# Patient Record
Sex: FEMALE | Race: WHITE | NOT HISPANIC OR LATINO | Employment: PART TIME | URBAN - METROPOLITAN AREA
[De-identification: names, ages, dates, MRNs, and addresses within clinical notes are randomized per-mention and may not be internally consistent; named-entity substitution may affect disease eponyms.]

---

## 2017-06-06 ENCOUNTER — OFFICE VISIT - HEALTHEAST (OUTPATIENT)
Dept: INTERNAL MEDICINE | Facility: CLINIC | Age: 62
End: 2017-06-06

## 2017-06-06 ENCOUNTER — COMMUNICATION - HEALTHEAST (OUTPATIENT)
Dept: TELEHEALTH | Facility: CLINIC | Age: 62
End: 2017-06-06

## 2017-06-06 DIAGNOSIS — Z12.31 OTHER SCREENING MAMMOGRAM: ICD-10-CM

## 2017-06-06 DIAGNOSIS — Z12.4 SCREENING FOR CERVICAL CANCER: ICD-10-CM

## 2017-06-06 DIAGNOSIS — Z00.00 ROUTINE GENERAL MEDICAL EXAMINATION AT A HEALTH CARE FACILITY: ICD-10-CM

## 2017-06-06 DIAGNOSIS — G89.29 CHRONIC BILATERAL LOW BACK PAIN WITH SCIATICA, SCIATICA LATERALITY UNSPECIFIED: ICD-10-CM

## 2017-06-06 DIAGNOSIS — Z13.220 SCREENING FOR HYPERLIPIDEMIA: ICD-10-CM

## 2017-06-06 DIAGNOSIS — Z12.11 SCREENING FOR COLON CANCER: ICD-10-CM

## 2017-06-06 DIAGNOSIS — Z13.1 SCREENING FOR DIABETES MELLITUS: ICD-10-CM

## 2017-06-06 DIAGNOSIS — M54.40 CHRONIC BILATERAL LOW BACK PAIN WITH SCIATICA, SCIATICA LATERALITY UNSPECIFIED: ICD-10-CM

## 2017-06-06 LAB
CHOLEST SERPL-MCNC: 231 MG/DL
FASTING STATUS PATIENT QL REPORTED: YES
HBA1C MFR BLD: 5.8 % (ref 3.5–6)
HDLC SERPL-MCNC: 63 MG/DL
LDLC SERPL CALC-MCNC: 146 MG/DL
TRIGL SERPL-MCNC: 110 MG/DL

## 2017-06-06 ASSESSMENT — MIFFLIN-ST. JEOR: SCORE: 1403.18

## 2017-06-13 ENCOUNTER — RECORDS - HEALTHEAST (OUTPATIENT)
Dept: ADMINISTRATIVE | Facility: OTHER | Age: 62
End: 2017-06-13

## 2017-06-13 ENCOUNTER — HOSPITAL ENCOUNTER (OUTPATIENT)
Dept: MAMMOGRAPHY | Facility: CLINIC | Age: 62
Discharge: HOME | End: 2017-06-13
Attending: INTERNAL MEDICINE

## 2017-06-13 ENCOUNTER — HOSPITAL ENCOUNTER (OUTPATIENT)
Dept: MRI IMAGING | Facility: CLINIC | Age: 62
Discharge: HOME | End: 2017-06-13
Attending: INTERNAL MEDICINE

## 2017-06-13 DIAGNOSIS — Z12.31 OTHER SCREENING MAMMOGRAM: ICD-10-CM

## 2017-06-13 DIAGNOSIS — M54.40 CHRONIC BILATERAL LOW BACK PAIN WITH SCIATICA, SCIATICA LATERALITY UNSPECIFIED: ICD-10-CM

## 2017-06-13 DIAGNOSIS — G89.29 CHRONIC BILATERAL LOW BACK PAIN WITH SCIATICA, SCIATICA LATERALITY UNSPECIFIED: ICD-10-CM

## 2017-06-16 ENCOUNTER — COMMUNICATION - HEALTHEAST (OUTPATIENT)
Dept: INTERNAL MEDICINE | Facility: CLINIC | Age: 62
End: 2017-06-16

## 2017-06-16 ENCOUNTER — HOSPITAL ENCOUNTER (OUTPATIENT)
Dept: PHYSICAL MEDICINE AND REHAB | Facility: CLINIC | Age: 62
Discharge: HOME | End: 2017-06-16
Attending: INTERNAL MEDICINE

## 2017-06-16 DIAGNOSIS — M47.816 LUMBAR FACET ARTHROPATHY: ICD-10-CM

## 2017-06-16 DIAGNOSIS — M43.16 SPONDYLOLISTHESIS, LUMBAR REGION: ICD-10-CM

## 2017-06-16 DIAGNOSIS — M54.50 LUMBAGO: ICD-10-CM

## 2017-06-16 ASSESSMENT — MIFFLIN-ST. JEOR: SCORE: 1385.03

## 2017-07-03 ENCOUNTER — OFFICE VISIT - HEALTHEAST (OUTPATIENT)
Dept: PHYSICAL THERAPY | Facility: CLINIC | Age: 62
End: 2017-07-03

## 2017-07-03 DIAGNOSIS — M54.50 CHRONIC BILATERAL LOW BACK PAIN WITHOUT SCIATICA: ICD-10-CM

## 2017-07-03 DIAGNOSIS — M47.816 FACET ARTHROPATHY, LUMBAR: ICD-10-CM

## 2017-07-03 DIAGNOSIS — G89.29 CHRONIC BILATERAL LOW BACK PAIN WITHOUT SCIATICA: ICD-10-CM

## 2017-07-03 DIAGNOSIS — R29.898 WEAKNESS OF BACK: ICD-10-CM

## 2017-07-07 ENCOUNTER — OFFICE VISIT - HEALTHEAST (OUTPATIENT)
Dept: PHYSICAL THERAPY | Facility: CLINIC | Age: 62
End: 2017-07-07

## 2017-07-07 DIAGNOSIS — M47.816 FACET ARTHROPATHY, LUMBAR: ICD-10-CM

## 2017-07-07 DIAGNOSIS — G89.29 CHRONIC BILATERAL LOW BACK PAIN WITHOUT SCIATICA: ICD-10-CM

## 2017-07-07 DIAGNOSIS — R29.898 WEAKNESS OF BACK: ICD-10-CM

## 2017-07-07 DIAGNOSIS — M54.50 CHRONIC BILATERAL LOW BACK PAIN WITHOUT SCIATICA: ICD-10-CM

## 2017-07-10 ENCOUNTER — OFFICE VISIT - HEALTHEAST (OUTPATIENT)
Dept: PHYSICAL THERAPY | Facility: CLINIC | Age: 62
End: 2017-07-10

## 2017-07-10 DIAGNOSIS — G89.29 CHRONIC BILATERAL LOW BACK PAIN WITHOUT SCIATICA: ICD-10-CM

## 2017-07-10 DIAGNOSIS — R29.898 WEAKNESS OF BACK: ICD-10-CM

## 2017-07-10 DIAGNOSIS — M54.50 CHRONIC BILATERAL LOW BACK PAIN WITHOUT SCIATICA: ICD-10-CM

## 2017-07-10 DIAGNOSIS — M47.816 FACET ARTHROPATHY, LUMBAR: ICD-10-CM

## 2017-07-14 ENCOUNTER — OFFICE VISIT - HEALTHEAST (OUTPATIENT)
Dept: PHYSICAL THERAPY | Facility: CLINIC | Age: 62
End: 2017-07-14

## 2017-07-14 DIAGNOSIS — M47.816 FACET ARTHROPATHY, LUMBAR: ICD-10-CM

## 2017-07-14 DIAGNOSIS — M54.50 CHRONIC BILATERAL LOW BACK PAIN WITHOUT SCIATICA: ICD-10-CM

## 2017-07-14 DIAGNOSIS — G89.29 CHRONIC BILATERAL LOW BACK PAIN WITHOUT SCIATICA: ICD-10-CM

## 2017-07-14 DIAGNOSIS — R29.898 WEAKNESS OF BACK: ICD-10-CM

## 2017-07-17 ENCOUNTER — OFFICE VISIT - HEALTHEAST (OUTPATIENT)
Dept: PHYSICAL THERAPY | Facility: CLINIC | Age: 62
End: 2017-07-17

## 2017-07-17 DIAGNOSIS — M54.50 CHRONIC BILATERAL LOW BACK PAIN WITHOUT SCIATICA: ICD-10-CM

## 2017-07-17 DIAGNOSIS — M47.816 FACET ARTHROPATHY, LUMBAR: ICD-10-CM

## 2017-07-17 DIAGNOSIS — G89.29 CHRONIC BILATERAL LOW BACK PAIN WITHOUT SCIATICA: ICD-10-CM

## 2017-07-17 DIAGNOSIS — R29.898 WEAKNESS OF BACK: ICD-10-CM

## 2017-07-19 ENCOUNTER — OFFICE VISIT - HEALTHEAST (OUTPATIENT)
Dept: PHYSICAL THERAPY | Facility: CLINIC | Age: 62
End: 2017-07-19

## 2017-07-19 DIAGNOSIS — M47.816 FACET ARTHROPATHY, LUMBAR: ICD-10-CM

## 2017-07-19 DIAGNOSIS — G89.29 CHRONIC BILATERAL LOW BACK PAIN WITHOUT SCIATICA: ICD-10-CM

## 2017-07-19 DIAGNOSIS — M54.50 CHRONIC BILATERAL LOW BACK PAIN WITHOUT SCIATICA: ICD-10-CM

## 2017-07-19 DIAGNOSIS — R29.898 WEAKNESS OF BACK: ICD-10-CM

## 2017-07-24 ENCOUNTER — OFFICE VISIT - HEALTHEAST (OUTPATIENT)
Dept: PHYSICAL THERAPY | Facility: CLINIC | Age: 62
End: 2017-07-24

## 2017-07-24 DIAGNOSIS — G89.29 CHRONIC BILATERAL LOW BACK PAIN WITHOUT SCIATICA: ICD-10-CM

## 2017-07-24 DIAGNOSIS — R29.898 WEAKNESS OF BACK: ICD-10-CM

## 2017-07-24 DIAGNOSIS — M54.50 CHRONIC BILATERAL LOW BACK PAIN WITHOUT SCIATICA: ICD-10-CM

## 2017-07-24 DIAGNOSIS — M47.816 FACET ARTHROPATHY, LUMBAR: ICD-10-CM

## 2017-07-26 ENCOUNTER — OFFICE VISIT - HEALTHEAST (OUTPATIENT)
Dept: PHYSICAL THERAPY | Facility: CLINIC | Age: 62
End: 2017-07-26

## 2017-07-26 DIAGNOSIS — G89.29 CHRONIC BILATERAL LOW BACK PAIN WITHOUT SCIATICA: ICD-10-CM

## 2017-07-26 DIAGNOSIS — R29.898 WEAKNESS OF BACK: ICD-10-CM

## 2017-07-26 DIAGNOSIS — M47.816 FACET ARTHROPATHY, LUMBAR: ICD-10-CM

## 2017-07-26 DIAGNOSIS — M54.50 CHRONIC BILATERAL LOW BACK PAIN WITHOUT SCIATICA: ICD-10-CM

## 2017-07-28 ENCOUNTER — HOSPITAL ENCOUNTER (OUTPATIENT)
Dept: PHYSICAL MEDICINE AND REHAB | Facility: CLINIC | Age: 62
Discharge: HOME | End: 2017-07-28
Attending: PHYSICIAN ASSISTANT

## 2017-07-28 DIAGNOSIS — M47.816 LUMBAR FACET ARTHROPATHY: ICD-10-CM

## 2017-07-28 DIAGNOSIS — M54.50 LUMBAGO: ICD-10-CM

## 2017-08-11 ENCOUNTER — RECORDS - HEALTHEAST (OUTPATIENT)
Dept: ADMINISTRATIVE | Facility: OTHER | Age: 62
End: 2017-08-11

## 2017-12-29 ENCOUNTER — OFFICE VISIT - HEALTHEAST (OUTPATIENT)
Dept: INTERNAL MEDICINE | Facility: CLINIC | Age: 62
End: 2017-12-29

## 2017-12-29 DIAGNOSIS — J06.9 URI (UPPER RESPIRATORY INFECTION): ICD-10-CM

## 2017-12-29 DIAGNOSIS — J32.9 SINUSITIS: ICD-10-CM

## 2018-06-07 ENCOUNTER — OFFICE VISIT - HEALTHEAST (OUTPATIENT)
Dept: INTERNAL MEDICINE | Facility: CLINIC | Age: 63
End: 2018-06-07

## 2018-06-07 ENCOUNTER — COMMUNICATION - HEALTHEAST (OUTPATIENT)
Dept: INTERNAL MEDICINE | Facility: CLINIC | Age: 63
End: 2018-06-07

## 2018-06-07 DIAGNOSIS — N28.1 RENAL CYST: ICD-10-CM

## 2018-06-07 DIAGNOSIS — Z00.00 ANNUAL PHYSICAL EXAM: ICD-10-CM

## 2018-06-07 DIAGNOSIS — E55.9 VITAMIN D DEFICIENCY: ICD-10-CM

## 2018-06-07 DIAGNOSIS — N89.8 VAGINAL DISCHARGE: ICD-10-CM

## 2018-06-07 DIAGNOSIS — E78.5 HYPERLIPIDEMIA: ICD-10-CM

## 2018-06-07 DIAGNOSIS — D69.1 THROMBOCYTOPATHIA (H): ICD-10-CM

## 2018-06-07 DIAGNOSIS — Z12.4 ENCOUNTER FOR SCREENING FOR CERVICAL CANCER: ICD-10-CM

## 2018-06-07 DIAGNOSIS — Z12.31 ENCOUNTER FOR SCREENING MAMMOGRAM FOR BREAST CANCER: ICD-10-CM

## 2018-06-07 LAB
ALBUMIN SERPL-MCNC: 4.3 G/DL (ref 3.5–5)
ALP SERPL-CCNC: 54 U/L (ref 45–120)
ALT SERPL W P-5'-P-CCNC: 15 U/L (ref 0–45)
ANION GAP SERPL CALCULATED.3IONS-SCNC: 10 MMOL/L (ref 5–18)
AST SERPL W P-5'-P-CCNC: 14 U/L (ref 0–40)
BASOPHILS # BLD AUTO: 0 THOU/UL (ref 0–0.2)
BASOPHILS NFR BLD AUTO: 0 % (ref 0–2)
BILIRUB SERPL-MCNC: 0.7 MG/DL (ref 0–1)
BUN SERPL-MCNC: 11 MG/DL (ref 8–22)
CALCIUM SERPL-MCNC: 10 MG/DL (ref 8.5–10.5)
CHLORIDE BLD-SCNC: 105 MMOL/L (ref 98–107)
CHOLEST SERPL-MCNC: 242 MG/DL
CLUE CELLS: NORMAL
CO2 SERPL-SCNC: 26 MMOL/L (ref 22–31)
CREAT SERPL-MCNC: 0.85 MG/DL (ref 0.6–1.1)
EOSINOPHIL # BLD AUTO: 0.1 THOU/UL (ref 0–0.4)
EOSINOPHIL NFR BLD AUTO: 3 % (ref 0–6)
ERYTHROCYTE [DISTWIDTH] IN BLOOD BY AUTOMATED COUNT: 11.9 % (ref 11–14.5)
FASTING STATUS PATIENT QL REPORTED: YES
FOLATE SERPL-MCNC: 15.4 NG/ML
GFR SERPL CREATININE-BSD FRML MDRD: >60 ML/MIN/1.73M2
GLUCOSE BLD-MCNC: 103 MG/DL (ref 70–125)
HCT VFR BLD AUTO: 41.9 % (ref 35–47)
HDLC SERPL-MCNC: 66 MG/DL
HGB BLD-MCNC: 14.3 G/DL (ref 12–16)
LDLC SERPL CALC-MCNC: 156 MG/DL
LYMPHOCYTES # BLD AUTO: 1.6 THOU/UL (ref 0.8–4.4)
LYMPHOCYTES NFR BLD AUTO: 38 % (ref 20–40)
MCH RBC QN AUTO: 30.7 PG (ref 27–34)
MCHC RBC AUTO-ENTMCNC: 34.1 G/DL (ref 32–36)
MCV RBC AUTO: 90 FL (ref 80–100)
MONOCYTES # BLD AUTO: 0.2 THOU/UL (ref 0–0.9)
MONOCYTES NFR BLD AUTO: 5 % (ref 2–10)
NEUTROPHILS # BLD AUTO: 2.3 THOU/UL (ref 2–7.7)
NEUTROPHILS NFR BLD AUTO: 54 % (ref 50–70)
PLATELET # BLD AUTO: 88 THOU/UL (ref 140–440)
PMV BLD AUTO: 10.8 FL (ref 7–10)
POTASSIUM BLD-SCNC: 4.1 MMOL/L (ref 3.5–5)
PROT SERPL-MCNC: 7 G/DL (ref 6–8)
RBC # BLD AUTO: 4.65 MILL/UL (ref 3.8–5.4)
SODIUM SERPL-SCNC: 141 MMOL/L (ref 136–145)
TRICHOMONAS, WET PREP: NORMAL
TRIGL SERPL-MCNC: 102 MG/DL
TSH SERPL DL<=0.005 MIU/L-ACNC: 0.95 UIU/ML (ref 0.3–5)
VIT B12 SERPL-MCNC: 568 PG/ML (ref 213–816)
WBC: 4.3 THOU/UL (ref 4–11)
YEAST, WET PREP: NORMAL

## 2018-06-07 ASSESSMENT — MIFFLIN-ST. JEOR: SCORE: 1356.46

## 2018-06-08 LAB
25(OH)D3 SERPL-MCNC: 36.4 NG/ML (ref 30–80)
25(OH)D3 SERPL-MCNC: 36.4 NG/ML (ref 30–80)
HPV SOURCE: NORMAL
HUMAN PAPILLOMA VIRUS 16 DNA: NEGATIVE
HUMAN PAPILLOMA VIRUS 18 DNA: NEGATIVE
HUMAN PAPILLOMA VIRUS FINAL DIAGNOSIS: NORMAL
HUMAN PAPILLOMA VIRUS OTHER HR: NEGATIVE
SPECIMEN DESCRIPTION: NORMAL

## 2018-06-10 ENCOUNTER — COMMUNICATION - HEALTHEAST (OUTPATIENT)
Dept: INTERNAL MEDICINE | Facility: CLINIC | Age: 63
End: 2018-06-10

## 2018-06-10 DIAGNOSIS — D69.6 THROMBOCYTOPENIA (H): ICD-10-CM

## 2018-06-13 ENCOUNTER — HOSPITAL ENCOUNTER (OUTPATIENT)
Dept: ULTRASOUND IMAGING | Facility: HOSPITAL | Age: 63
Discharge: HOME | End: 2018-06-13
Attending: INTERNAL MEDICINE

## 2018-06-13 DIAGNOSIS — N28.1 RENAL CYST: ICD-10-CM

## 2018-06-18 ENCOUNTER — RECORDS - HEALTHEAST (OUTPATIENT)
Dept: ADMINISTRATIVE | Facility: OTHER | Age: 63
End: 2018-06-18

## 2018-06-19 LAB
BKR LAB AP ABNORMAL BLEEDING: NO
BKR LAB AP BIRTH CONTROL/HORMONES: NORMAL
BKR LAB AP CERVICAL APPEARANCE: NORMAL
BKR LAB AP GYN ADEQUACY: NORMAL
BKR LAB AP GYN INTERPRETATION: NORMAL
BKR LAB AP HPV REFLEX: NORMAL
BKR LAB AP LMP: NORMAL
BKR LAB AP PATIENT STATUS: NORMAL
BKR LAB AP PREVIOUS ABNORMAL: NORMAL
BKR LAB AP PREVIOUS NORMAL: 2015
HIGH RISK?: NO
PATH REPORT.COMMENTS IMP SPEC: NORMAL
RESULT FLAG (HE HISTORICAL CONVERSION): NORMAL

## 2018-06-20 ENCOUNTER — HOSPITAL ENCOUNTER (OUTPATIENT)
Dept: MAMMOGRAPHY | Facility: CLINIC | Age: 63
Discharge: HOME | End: 2018-06-20
Attending: INTERNAL MEDICINE

## 2018-06-20 DIAGNOSIS — Z12.31 ENCOUNTER FOR SCREENING MAMMOGRAM FOR BREAST CANCER: ICD-10-CM

## 2018-08-08 ENCOUNTER — AMBULATORY - HEALTHEAST (OUTPATIENT)
Dept: NURSING | Facility: CLINIC | Age: 63
End: 2018-08-08

## 2018-08-08 ENCOUNTER — COMMUNICATION - HEALTHEAST (OUTPATIENT)
Dept: INTERNAL MEDICINE | Facility: CLINIC | Age: 63
End: 2018-08-08

## 2018-08-08 DIAGNOSIS — Z00.00 ROUTINE HEALTH MAINTENANCE: ICD-10-CM

## 2018-08-27 ENCOUNTER — COMMUNICATION - HEALTHEAST (OUTPATIENT)
Dept: INTERNAL MEDICINE | Facility: CLINIC | Age: 63
End: 2018-08-27

## 2018-08-27 DIAGNOSIS — N95.2 ATROPHIC VAGINITIS: ICD-10-CM

## 2018-09-24 ENCOUNTER — AMBULATORY - HEALTHEAST (OUTPATIENT)
Dept: LAB | Facility: CLINIC | Age: 63
End: 2018-09-24

## 2018-09-24 DIAGNOSIS — D69.6 THROMBOCYTOPENIA (H): ICD-10-CM

## 2018-09-24 LAB
BASOPHILS # BLD AUTO: 0 THOU/UL (ref 0–0.2)
BASOPHILS NFR BLD AUTO: 1 % (ref 0–2)
EOSINOPHIL # BLD AUTO: 0.1 THOU/UL (ref 0–0.4)
EOSINOPHIL NFR BLD AUTO: 2 % (ref 0–6)
ERYTHROCYTE [DISTWIDTH] IN BLOOD BY AUTOMATED COUNT: 12.3 % (ref 11–14.5)
HCT VFR BLD AUTO: 38 % (ref 35–47)
HGB BLD-MCNC: 13.2 G/DL (ref 12–16)
LYMPHOCYTES # BLD AUTO: 1.5 THOU/UL (ref 0.8–4.4)
LYMPHOCYTES NFR BLD AUTO: 32 % (ref 20–40)
MCH RBC QN AUTO: 30.8 PG (ref 27–34)
MCHC RBC AUTO-ENTMCNC: 34.7 G/DL (ref 32–36)
MCV RBC AUTO: 89 FL (ref 80–100)
MONOCYTES # BLD AUTO: 0.5 THOU/UL (ref 0–0.9)
MONOCYTES NFR BLD AUTO: 11 % (ref 2–10)
NEUTROPHILS # BLD AUTO: 2.5 THOU/UL (ref 2–7.7)
NEUTROPHILS NFR BLD AUTO: 54 % (ref 50–70)
PLATELET # BLD AUTO: 136 THOU/UL (ref 140–440)
PMV BLD AUTO: 12.5 FL (ref 8.5–12.5)
RBC # BLD AUTO: 4.29 MILL/UL (ref 3.8–5.4)
WBC: 4.6 THOU/UL (ref 4–11)

## 2018-09-25 LAB
BASOPHILS # BLD AUTO: 0 THOU/UL (ref 0–0.2)
BASOPHILS NFR BLD AUTO: 1 % (ref 0–2)
EOSINOPHIL COUNT (ABSOLUTE): 0.1 THOU/UL (ref 0–0.4)
EOSINOPHIL NFR BLD AUTO: 2 % (ref 0–6)
ERYTHROCYTE [DISTWIDTH] IN BLOOD BY AUTOMATED COUNT: 12.3 % (ref 11–14.5)
HCT VFR BLD AUTO: 38 % (ref 35–47)
HGB BLD-MCNC: 13.2 G/DL (ref 12–16)
LAB AP CHARGES (HE HISTORICAL CONVERSION): NORMAL
LYMPHOCYTES # BLD AUTO: 1.2 THOU/UL (ref 0.8–4.4)
LYMPHOCYTES NFR BLD AUTO: 26 % (ref 20–40)
MCH RBC QN AUTO: 30.8 PG (ref 27–34)
MCHC RBC AUTO-ENTMCNC: 34.7 G/DL (ref 32–36)
MCV RBC AUTO: 89 FL (ref 80–100)
MONOCYTES # BLD AUTO: 0.3 THOU/UL (ref 0–0.9)
MONOCYTES NFR BLD AUTO: 7 % (ref 2–10)
PATH REPORT.COMMENTS IMP SPEC: NORMAL
PATH REPORT.COMMENTS IMP SPEC: NORMAL
PATH REPORT.FINAL DX SPEC: NORMAL
PATH REPORT.MICROSCOPIC SPEC OTHER STN: ABNORMAL
PATH REPORT.MICROSCOPIC SPEC OTHER STN: NORMAL
PATH REPORT.RELEVANT HX SPEC: NORMAL
PLAT MORPH BLD: ABNORMAL
PLATELET # BLD AUTO: 136 THOU/UL (ref 140–440)
PMV BLD AUTO: 12.5 FL (ref 8.5–12.5)
RBC # BLD AUTO: 4.29 MILL/UL (ref 3.8–5.4)
TOTAL NEUTROPHILS-ABS(DIFF): 3 THOU/UL (ref 2–7.7)
TOTAL NEUTROPHILS-REL(DIFF): 65 % (ref 50–70)
WBC: 4.6 THOU/UL (ref 4–11)

## 2018-11-16 ENCOUNTER — AMBULATORY - HEALTHEAST (OUTPATIENT)
Dept: NURSING | Facility: CLINIC | Age: 63
End: 2018-11-16

## 2018-11-16 DIAGNOSIS — Z00.00 ROUTINE HEALTH MAINTENANCE: ICD-10-CM

## 2018-11-26 ENCOUNTER — COMMUNICATION - HEALTHEAST (OUTPATIENT)
Dept: INTERNAL MEDICINE | Facility: CLINIC | Age: 63
End: 2018-11-26

## 2018-11-26 DIAGNOSIS — N95.2 ATROPHIC VAGINITIS: ICD-10-CM

## 2018-12-03 ENCOUNTER — OFFICE VISIT - HEALTHEAST (OUTPATIENT)
Dept: INTERNAL MEDICINE | Facility: CLINIC | Age: 63
End: 2018-12-03

## 2018-12-03 ENCOUNTER — RECORDS - HEALTHEAST (OUTPATIENT)
Dept: GENERAL RADIOLOGY | Facility: CLINIC | Age: 63
End: 2018-12-03

## 2018-12-03 DIAGNOSIS — M79.644 PAIN OF FINGER OF RIGHT HAND: ICD-10-CM

## 2018-12-03 DIAGNOSIS — M79.644 PAIN IN RIGHT FINGER(S): ICD-10-CM

## 2018-12-03 LAB
BASOPHILS # BLD AUTO: 0.1 THOU/UL (ref 0–0.2)
BASOPHILS NFR BLD AUTO: 1 % (ref 0–2)
EOSINOPHIL # BLD AUTO: 0.1 THOU/UL (ref 0–0.4)
EOSINOPHIL NFR BLD AUTO: 2 % (ref 0–6)
ERYTHROCYTE [DISTWIDTH] IN BLOOD BY AUTOMATED COUNT: 12.1 % (ref 11–14.5)
ERYTHROCYTE [SEDIMENTATION RATE] IN BLOOD BY WESTERGREN METHOD: 10 MM/HR (ref 0–20)
HCT VFR BLD AUTO: 39.1 % (ref 35–47)
HGB BLD-MCNC: 13.3 G/DL (ref 12–16)
LYMPHOCYTES # BLD AUTO: 2.2 THOU/UL (ref 0.8–4.4)
LYMPHOCYTES NFR BLD AUTO: 34 % (ref 20–40)
MCH RBC QN AUTO: 30.9 PG (ref 27–34)
MCHC RBC AUTO-ENTMCNC: 34 G/DL (ref 32–36)
MCV RBC AUTO: 91 FL (ref 80–100)
MONOCYTES # BLD AUTO: 0.6 THOU/UL (ref 0–0.9)
MONOCYTES NFR BLD AUTO: 8 % (ref 2–10)
NEUTROPHILS # BLD AUTO: 3.7 THOU/UL (ref 2–7.7)
NEUTROPHILS NFR BLD AUTO: 55 % (ref 50–70)
PLATELET # BLD AUTO: 140 THOU/UL (ref 140–440)
PMV BLD AUTO: 13.3 FL (ref 8.5–12.5)
RBC # BLD AUTO: 4.31 MILL/UL (ref 3.8–5.4)
WBC: 6.7 THOU/UL (ref 4–11)

## 2018-12-04 LAB
ANION GAP SERPL CALCULATED.3IONS-SCNC: 10 MMOL/L (ref 5–18)
BUN SERPL-MCNC: 16 MG/DL (ref 8–22)
CALCIUM SERPL-MCNC: 10 MG/DL (ref 8.5–10.5)
CCP AB SER IA-ACNC: 2.5 U/ML
CHLORIDE BLD-SCNC: 104 MMOL/L (ref 98–107)
CO2 SERPL-SCNC: 26 MMOL/L (ref 22–31)
CREAT SERPL-MCNC: 0.78 MG/DL (ref 0.6–1.1)
GFR SERPL CREATININE-BSD FRML MDRD: >60 ML/MIN/1.73M2
GLUCOSE BLD-MCNC: 93 MG/DL (ref 70–125)
POTASSIUM BLD-SCNC: 4.2 MMOL/L (ref 3.5–5)
RHEUMATOID FACT SERPL-ACNC: <15 IU/ML (ref 0–30)
SODIUM SERPL-SCNC: 140 MMOL/L (ref 136–145)
URATE SERPL-MCNC: 3.3 MG/DL (ref 2–7.5)

## 2018-12-05 LAB — B BURGDOR IGG+IGM SER QL: 0.17 INDEX VALUE

## 2018-12-06 ENCOUNTER — COMMUNICATION - HEALTHEAST (OUTPATIENT)
Dept: INTERNAL MEDICINE | Facility: CLINIC | Age: 63
End: 2018-12-06

## 2018-12-06 LAB — ANA SER QL: 0.2 U

## 2019-02-14 ENCOUNTER — OFFICE VISIT - HEALTHEAST (OUTPATIENT)
Dept: RHEUMATOLOGY | Facility: CLINIC | Age: 64
End: 2019-02-14

## 2019-02-14 DIAGNOSIS — M19.049 PRIMARY LOCALIZED OSTEOARTHROSIS OF HAND, UNSPECIFIED LATERALITY: ICD-10-CM

## 2019-02-14 DIAGNOSIS — M25.50 POLYARTHRALGIA: ICD-10-CM

## 2019-02-14 ASSESSMENT — MIFFLIN-ST. JEOR: SCORE: 1385.03

## 2019-02-17 ENCOUNTER — COMMUNICATION - HEALTHEAST (OUTPATIENT)
Dept: INTERNAL MEDICINE | Facility: CLINIC | Age: 64
End: 2019-02-17

## 2019-02-17 DIAGNOSIS — N95.2 ATROPHIC VAGINITIS: ICD-10-CM

## 2019-05-28 ENCOUNTER — OFFICE VISIT - HEALTHEAST (OUTPATIENT)
Dept: RHEUMATOLOGY | Facility: CLINIC | Age: 64
End: 2019-05-28

## 2019-05-28 DIAGNOSIS — M19.049 PRIMARY LOCALIZED OSTEOARTHROSIS OF HAND, UNSPECIFIED LATERALITY: ICD-10-CM

## 2019-05-28 DIAGNOSIS — M25.50 POLYARTHRALGIA: ICD-10-CM

## 2019-05-28 ASSESSMENT — MIFFLIN-ST. JEOR: SCORE: 1379.14

## 2019-07-12 ENCOUNTER — RECORDS - HEALTHEAST (OUTPATIENT)
Dept: GENERAL RADIOLOGY | Facility: CLINIC | Age: 64
End: 2019-07-12

## 2019-07-12 ENCOUNTER — OFFICE VISIT - HEALTHEAST (OUTPATIENT)
Dept: INTERNAL MEDICINE | Facility: CLINIC | Age: 64
End: 2019-07-12

## 2019-07-12 DIAGNOSIS — E55.9 VITAMIN D DEFICIENCY: ICD-10-CM

## 2019-07-12 DIAGNOSIS — L71.9 ROSACEA: ICD-10-CM

## 2019-07-12 DIAGNOSIS — E78.49 OTHER HYPERLIPIDEMIA: ICD-10-CM

## 2019-07-12 DIAGNOSIS — Z00.00 ANNUAL PHYSICAL EXAM: ICD-10-CM

## 2019-07-12 DIAGNOSIS — N95.2 ATROPHIC VAGINITIS: ICD-10-CM

## 2019-07-12 DIAGNOSIS — M25.571 PAIN IN JOINT, ANKLE AND FOOT, RIGHT: ICD-10-CM

## 2019-07-12 DIAGNOSIS — M25.571 PAIN IN RIGHT ANKLE AND JOINTS OF RIGHT FOOT: ICD-10-CM

## 2019-07-12 DIAGNOSIS — M79.89 RIGHT LEG SWELLING: ICD-10-CM

## 2019-07-12 LAB
CHOLEST SERPL-MCNC: 251 MG/DL
D DIMER PPP FEU-MCNC: <=0.27 FEU UG/ML
FASTING STATUS PATIENT QL REPORTED: YES
HDLC SERPL-MCNC: 68 MG/DL
LDLC SERPL CALC-MCNC: 158 MG/DL
TRIGL SERPL-MCNC: 123 MG/DL
TSH SERPL DL<=0.005 MIU/L-ACNC: 0.73 UIU/ML (ref 0.3–5)

## 2019-07-12 RX ORDER — AZELAIC ACID 0.15 G/G
GEL TOPICAL
Qty: 30 G | Refills: 3 | Status: SHIPPED | OUTPATIENT
Start: 2019-07-12 | End: 2022-11-16

## 2019-07-12 ASSESSMENT — MIFFLIN-ST. JEOR: SCORE: 1371.71

## 2019-07-15 LAB
25(OH)D3 SERPL-MCNC: 33.7 NG/ML (ref 30–80)
25(OH)D3 SERPL-MCNC: 33.7 NG/ML (ref 30–80)

## 2019-07-24 ENCOUNTER — OFFICE VISIT - HEALTHEAST (OUTPATIENT)
Dept: PHYSICAL THERAPY | Facility: REHABILITATION | Age: 64
End: 2019-07-24

## 2019-07-24 DIAGNOSIS — M25.671 ANKLE STIFFNESS, RIGHT: ICD-10-CM

## 2019-07-24 DIAGNOSIS — M62.81 GENERALIZED MUSCLE WEAKNESS: ICD-10-CM

## 2019-07-24 DIAGNOSIS — M25.672 ANKLE STIFFNESS, LEFT: ICD-10-CM

## 2019-07-24 DIAGNOSIS — R60.0 BILATERAL LOWER EXTREMITY EDEMA: ICD-10-CM

## 2019-07-24 DIAGNOSIS — M25.371 RIGHT ANKLE INSTABILITY: ICD-10-CM

## 2019-07-29 ENCOUNTER — HOSPITAL ENCOUNTER (OUTPATIENT)
Dept: MAMMOGRAPHY | Facility: CLINIC | Age: 64
Discharge: HOME | End: 2019-07-29
Attending: INTERNAL MEDICINE

## 2019-07-29 DIAGNOSIS — Z12.31 VISIT FOR SCREENING MAMMOGRAM: ICD-10-CM

## 2019-08-04 ENCOUNTER — COMMUNICATION - HEALTHEAST (OUTPATIENT)
Dept: INTERNAL MEDICINE | Facility: CLINIC | Age: 64
End: 2019-08-04

## 2019-08-04 DIAGNOSIS — R60.9 EDEMA: ICD-10-CM

## 2019-08-13 ENCOUNTER — OFFICE VISIT - HEALTHEAST (OUTPATIENT)
Dept: PHYSICAL THERAPY | Facility: REHABILITATION | Age: 64
End: 2019-08-13

## 2019-08-13 DIAGNOSIS — M25.671 ANKLE STIFFNESS, RIGHT: ICD-10-CM

## 2019-08-13 DIAGNOSIS — R60.0 BILATERAL LOWER EXTREMITY EDEMA: ICD-10-CM

## 2019-08-13 DIAGNOSIS — M25.672 ANKLE STIFFNESS, LEFT: ICD-10-CM

## 2019-08-13 DIAGNOSIS — M25.371 RIGHT ANKLE INSTABILITY: ICD-10-CM

## 2019-08-13 DIAGNOSIS — M62.81 GENERALIZED MUSCLE WEAKNESS: ICD-10-CM

## 2019-09-03 ENCOUNTER — OFFICE VISIT - HEALTHEAST (OUTPATIENT)
Dept: PHYSICAL THERAPY | Facility: REHABILITATION | Age: 64
End: 2019-09-03

## 2019-09-03 DIAGNOSIS — M25.671 ANKLE STIFFNESS, RIGHT: ICD-10-CM

## 2019-09-03 DIAGNOSIS — M25.672 ANKLE STIFFNESS, LEFT: ICD-10-CM

## 2019-09-03 DIAGNOSIS — M62.81 GENERALIZED MUSCLE WEAKNESS: ICD-10-CM

## 2019-09-03 DIAGNOSIS — R60.0 BILATERAL LOWER EXTREMITY EDEMA: ICD-10-CM

## 2019-09-03 DIAGNOSIS — M25.371 RIGHT ANKLE INSTABILITY: ICD-10-CM

## 2019-10-07 ENCOUNTER — OFFICE VISIT - HEALTHEAST (OUTPATIENT)
Dept: VASCULAR SURGERY | Facility: CLINIC | Age: 64
End: 2019-10-07

## 2019-10-07 DIAGNOSIS — M79.604 PAIN OF RIGHT LOWER EXTREMITY: ICD-10-CM

## 2019-10-07 DIAGNOSIS — I87.303 VENOUS HYPERTENSION OF LOWER EXTREMITY, BILATERAL: ICD-10-CM

## 2019-10-07 DIAGNOSIS — M21.41 PES PLANUS OF BOTH FEET: ICD-10-CM

## 2019-10-07 DIAGNOSIS — M79.89 LEG SWELLING: ICD-10-CM

## 2019-10-07 DIAGNOSIS — I87.2 VENOUS INSUFFICIENCY OF BOTH LOWER EXTREMITIES: ICD-10-CM

## 2019-10-07 DIAGNOSIS — M21.42 PES PLANUS OF BOTH FEET: ICD-10-CM

## 2019-10-07 ASSESSMENT — MIFFLIN-ST. JEOR: SCORE: 1371.42

## 2019-10-10 ENCOUNTER — COMMUNICATION - HEALTHEAST (OUTPATIENT)
Dept: OTHER | Facility: CLINIC | Age: 64
End: 2019-10-10

## 2019-10-25 ENCOUNTER — AMBULATORY - HEALTHEAST (OUTPATIENT)
Dept: OTHER | Facility: CLINIC | Age: 64
End: 2019-10-25

## 2019-10-25 ENCOUNTER — RECORDS - HEALTHEAST (OUTPATIENT)
Dept: VASCULAR ULTRASOUND | Facility: CLINIC | Age: 64
End: 2019-10-25

## 2019-10-25 DIAGNOSIS — M21.42 FLAT FOOT (PES PLANUS) (ACQUIRED), LEFT FOOT: ICD-10-CM

## 2019-10-25 DIAGNOSIS — M79.604 PAIN IN RIGHT LEG: ICD-10-CM

## 2019-10-25 DIAGNOSIS — M79.89 OTHER SPECIFIED SOFT TISSUE DISORDERS: ICD-10-CM

## 2019-10-25 DIAGNOSIS — M21.41 FLAT FOOT (PES PLANUS) (ACQUIRED), RIGHT FOOT: ICD-10-CM

## 2019-10-25 DIAGNOSIS — I87.2 VENOUS INSUFFICIENCY (CHRONIC) (PERIPHERAL): ICD-10-CM

## 2019-10-25 DIAGNOSIS — I87.303 CHRONIC VENOUS HYPERTENSION (IDIOPATHIC) WITHOUT COMPLICATIONS OF BILATERAL LOWER EXTREMITY: ICD-10-CM

## 2019-10-29 ENCOUNTER — COMMUNICATION - HEALTHEAST (OUTPATIENT)
Dept: VASCULAR SURGERY | Facility: CLINIC | Age: 64
End: 2019-10-29

## 2019-11-08 ENCOUNTER — AMBULATORY - HEALTHEAST (OUTPATIENT)
Dept: OTHER | Facility: CLINIC | Age: 64
End: 2019-11-08

## 2020-01-09 ENCOUNTER — OFFICE VISIT - HEALTHEAST (OUTPATIENT)
Dept: VASCULAR SURGERY | Facility: CLINIC | Age: 65
End: 2020-01-09

## 2020-01-09 DIAGNOSIS — M79.89 LEG SWELLING: ICD-10-CM

## 2020-01-09 DIAGNOSIS — M21.42 PES PLANUS OF BOTH FEET: ICD-10-CM

## 2020-01-09 DIAGNOSIS — I87.303 VENOUS HYPERTENSION OF LOWER EXTREMITY, BILATERAL: ICD-10-CM

## 2020-01-09 DIAGNOSIS — M21.41 PES PLANUS OF BOTH FEET: ICD-10-CM

## 2020-01-09 DIAGNOSIS — M79.604 PAIN OF RIGHT LOWER EXTREMITY: ICD-10-CM

## 2020-01-09 ASSESSMENT — MIFFLIN-ST. JEOR: SCORE: 1366.89

## 2020-01-10 ENCOUNTER — RECORDS - HEALTHEAST (OUTPATIENT)
Dept: ADMINISTRATIVE | Facility: OTHER | Age: 65
End: 2020-01-10

## 2020-07-13 ENCOUNTER — COMMUNICATION - HEALTHEAST (OUTPATIENT)
Dept: INTERNAL MEDICINE | Facility: CLINIC | Age: 65
End: 2020-07-13

## 2020-07-13 DIAGNOSIS — N95.2 ATROPHIC VAGINITIS: ICD-10-CM

## 2020-11-05 ENCOUNTER — OFFICE VISIT - HEALTHEAST (OUTPATIENT)
Dept: INTERNAL MEDICINE | Facility: CLINIC | Age: 65
End: 2020-11-05

## 2020-11-05 DIAGNOSIS — Z12.31 ENCOUNTER FOR SCREENING MAMMOGRAM FOR BREAST CANCER: ICD-10-CM

## 2020-11-05 DIAGNOSIS — Z11.59 NEED FOR HEPATITIS C SCREENING TEST: ICD-10-CM

## 2020-11-05 DIAGNOSIS — Z00.00 ROUTINE GENERAL MEDICAL EXAMINATION AT A HEALTH CARE FACILITY: ICD-10-CM

## 2020-11-05 DIAGNOSIS — E78.5 HYPERLIPIDEMIA LDL GOAL <130: ICD-10-CM

## 2020-11-05 DIAGNOSIS — Z23 NEED FOR VACCINATION: ICD-10-CM

## 2020-11-05 DIAGNOSIS — N95.2 ATROPHIC VAGINITIS: ICD-10-CM

## 2020-11-05 LAB
ALBUMIN SERPL-MCNC: 4.2 G/DL (ref 3.5–5)
ALP SERPL-CCNC: 69 U/L (ref 45–120)
ALT SERPL W P-5'-P-CCNC: 20 U/L (ref 0–45)
ANION GAP SERPL CALCULATED.3IONS-SCNC: 6 MMOL/L (ref 5–18)
AST SERPL W P-5'-P-CCNC: 19 U/L (ref 0–40)
BASOPHILS # BLD AUTO: 0 THOU/UL (ref 0–0.2)
BASOPHILS NFR BLD AUTO: 1 % (ref 0–2)
BILIRUB SERPL-MCNC: 0.6 MG/DL (ref 0–1)
BUN SERPL-MCNC: 11 MG/DL (ref 8–22)
CALCIUM SERPL-MCNC: 9.8 MG/DL (ref 8.5–10.5)
CHLORIDE BLD-SCNC: 105 MMOL/L (ref 98–107)
CHOLEST SERPL-MCNC: 238 MG/DL
CO2 SERPL-SCNC: 29 MMOL/L (ref 22–31)
CREAT SERPL-MCNC: 0.82 MG/DL (ref 0.6–1.1)
EOSINOPHIL # BLD AUTO: 0.1 THOU/UL (ref 0–0.4)
EOSINOPHIL NFR BLD AUTO: 2 % (ref 0–6)
ERYTHROCYTE [DISTWIDTH] IN BLOOD BY AUTOMATED COUNT: 11.8 % (ref 11–14.5)
FASTING STATUS PATIENT QL REPORTED: ABNORMAL
GFR SERPL CREATININE-BSD FRML MDRD: >60 ML/MIN/1.73M2
GLUCOSE BLD-MCNC: 95 MG/DL (ref 70–125)
HCT VFR BLD AUTO: 39.8 % (ref 35–47)
HDLC SERPL-MCNC: 67 MG/DL
HGB BLD-MCNC: 13.6 G/DL (ref 12–16)
LDLC SERPL CALC-MCNC: 144 MG/DL
LYMPHOCYTES # BLD AUTO: 1.6 THOU/UL (ref 0.8–4.4)
LYMPHOCYTES NFR BLD AUTO: 37 % (ref 20–40)
MCH RBC QN AUTO: 31.1 PG (ref 27–34)
MCHC RBC AUTO-ENTMCNC: 34.2 G/DL (ref 32–36)
MCV RBC AUTO: 91 FL (ref 80–100)
MONOCYTES # BLD AUTO: 0.3 THOU/UL (ref 0–0.9)
MONOCYTES NFR BLD AUTO: 8 % (ref 2–10)
NEUTROPHILS # BLD AUTO: 2.4 THOU/UL (ref 2–7.7)
NEUTROPHILS NFR BLD AUTO: 53 % (ref 50–70)
PLATELET # BLD AUTO: 141 THOU/UL (ref 140–440)
PMV BLD AUTO: 9.3 FL (ref 7–10)
POTASSIUM BLD-SCNC: 4.3 MMOL/L (ref 3.5–5)
PROT SERPL-MCNC: 6.7 G/DL (ref 6–8)
RBC # BLD AUTO: 4.37 MILL/UL (ref 3.8–5.4)
SODIUM SERPL-SCNC: 140 MMOL/L (ref 136–145)
TRIGL SERPL-MCNC: 133 MG/DL
TSH SERPL DL<=0.005 MIU/L-ACNC: 0.87 UIU/ML (ref 0.3–5)
WBC: 4.5 THOU/UL (ref 4–11)

## 2020-11-05 RX ORDER — ESTRADIOL 0.1 MG/G
CREAM VAGINAL
Qty: 42.5 G | Refills: 11 | Status: SHIPPED | OUTPATIENT
Start: 2020-11-05 | End: 2022-04-27

## 2020-11-05 ASSESSMENT — MIFFLIN-ST. JEOR: SCORE: 1366.89

## 2020-11-06 LAB — HCV AB SERPL QL IA: NEGATIVE

## 2020-11-27 ENCOUNTER — HOSPITAL ENCOUNTER (OUTPATIENT)
Dept: MAMMOGRAPHY | Facility: CLINIC | Age: 65
Discharge: HOME | End: 2020-11-27
Attending: INTERNAL MEDICINE

## 2020-11-27 DIAGNOSIS — Z12.31 ENCOUNTER FOR SCREENING MAMMOGRAM FOR BREAST CANCER: ICD-10-CM

## 2021-02-19 ENCOUNTER — RECORDS - HEALTHEAST (OUTPATIENT)
Dept: ADMINISTRATIVE | Facility: OTHER | Age: 66
End: 2021-02-19

## 2021-04-23 ENCOUNTER — COMMUNICATION - HEALTHEAST (OUTPATIENT)
Dept: INTERNAL MEDICINE | Facility: CLINIC | Age: 66
End: 2021-04-23

## 2021-05-29 NOTE — PROGRESS NOTES
ASSESSMENT AND PLAN:  Tosha Rosales 64 y.o. female is seen here on 05/28/19 for follow-up.  She has beginning of osteoarthritis.  There is little to suggest inflammatory joint disease no evidence of connective tissue disease.  She is managing her symptoms with acetaminophen.  She is to follow-up here on as-needed basis.    Diagnoses and all orders for this visit:    Primary localized osteoarthrosis of hand, unspecified laterality    Polyarthralgia      HISTORY OF PRESENTING ILLNESS:  Tosha Rosales, 64 y.o., female is here for follow-up of recent visit for polyarthralgias in association.  She is a /, who does spend quite a lot of time typing but also some writing who reports pain in her fingers with swelling going on since September 2018.  She initially thought that this might be related with the injury that she may have sustained during her yard work.  However the symptoms continued for too long and then she was finally seen at her primary physician's office where she had workup done here in December of last year all of which is available and reviewed without evidence of autoimmunity including normal anti-CCP antibody, rheumatoid factor, LISETH.  Over time she has noted that mornings she has more swelling she points to the PIPs.  This tends to get better as the day goes by.  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  She has not had other joint areas affected similarly.  She has not observed swelling in the MCPs, wrists, dorsum of the hand.  She noted pain level to be 3.0/10.  Today she noted is a good day.  Morning stiffness is no more than 30 minutes.  Despite this she has been able to continue to do all her day-to-day activities without any significant difficulty.  She reports no personal or family history of psoriasis ulcerative colitis Crohn's disease.  Her daughter has psoriasis as so does her .  " She is not a smoker alcohol up to 3 drinks per week.  She had rheumatic fever as a child.  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    Further historical information and ADL limitations as noted in the multidimensional health assessment questionnaire attached in the EMR.   ALLERGIES:Codeine    PAST MEDICAL/ACTIVE PROBLEMS/MEDICATION/ FAMILY HISTORY/SOCIAL DATA:  The patient has a family history of  No past medical history on file.  Social History     Tobacco Use   Smoking Status Never Smoker   Smokeless Tobacco Never Used     Patient Active Problem List   Diagnosis     Atrophic vaginitis     Polyp of colon     Hyperlipidemia     Acne erythematosa     Polyarthralgia     Primary localized osteoarthrosis, hand     Current Outpatient Medications   Medication Sig Dispense Refill     azelaic acid (FINACEA) 15 % gel Apply topically.       calcium-vitamin D3-vitamin K 500-100-40 mg-unit-mcg Chew Chew 1 tablet.       cholecalciferol, vitamin D3, 1,000 unit capsule Take 1,000 Units by mouth daily.       estradiol (ESTRACE) 0.01 % (0.1 mg/gram) vaginal cream INSERT 2GM INTO THE VAGINA ONCE WEEKLY. 42.5 g 1     multivitamin (ONE A DAY) per tablet Take 1 tablet by mouth.       diclofenac sodium (VOLTAREN) 1 % Gel Apply 1 gm 3 times a day. 100 g 3     No current facility-administered medications for this visit.      DETAILED EXAMINATION  05/28/19  :  Vitals:    05/28/19 1248   BP: 116/70   Pulse: 72   Weight: 176 lb 11.2 oz (80.2 kg)   Height: 5' 7\" (1.702 m)     Alert oriented. Head including the face is examined for malar rash, heliotropes, scarring, lupus pernio. Eyes examined for redness such as in episcleritis/scleritis, periorbital lesions.   Neck/ Face examined for parotid gland swelling, range of motion of neck.  Left upper and lower and right upper and lower extremities examined for tenderness, swelling, warmth of the appendicular joints, range of motion, edema, rash.  Some of the important findings included: Reduced " tenderness and PIPs, no synovitis in any of the palpable joints of upper extremities.   LAB / IMAGING DATA:  ALT   Date Value Ref Range Status   06/07/2018 15 0 - 45 U/L Final     Albumin   Date Value Ref Range Status   06/07/2018 4.3 3.5 - 5.0 g/dL Final     Creatinine   Date Value Ref Range Status   12/03/2018 0.78 0.60 - 1.10 mg/dL Final   06/07/2018 0.85 0.60 - 1.10 mg/dL Final   06/06/2017 0.86 0.60 - 1.10 mg/dL Final       WBC   Date Value Ref Range Status   12/03/2018 6.7 4.0 - 11.0 thou/uL Final   09/24/2018 4.6 4.0 - 11.0 thou/uL Final   09/24/2018 4.6 4.0 - 11.0 thou/uL Final     Hemoglobin   Date Value Ref Range Status   12/03/2018 13.3 12.0 - 16.0 g/dL Final   09/24/2018 13.2 12.0 - 16.0 g/dL Final   09/24/2018 13.2 12.0 - 16.0 g/dL Final     Platelets   Date Value Ref Range Status   12/03/2018 140 140 - 440 thou/uL Final   09/24/2018 136 (L) 140 - 440 thou/uL Final   09/24/2018 136 (L) 140 - 440 thou/uL Final       Lab Results   Component Value Date    RF <15.0 12/03/2018    SEDRATE 10 12/03/2018

## 2021-05-30 NOTE — PATIENT INSTRUCTIONS - HE
1.  B/p is a little elevated , try to use unsalted nuts, avoid weight gain.     2. Mammogram now    3. Ankle swelling is most likely due to recurrent sprain. Will check D dimer to rule out clot, have XR done and see PT.

## 2021-05-30 NOTE — PROGRESS NOTES
[facility] Follow Up Note    Assessment/Plan:    1. Annual physical exam  Patient is up-to-date on colonoscopy and Pap smear.  He is due for mammogram in the    2. Atrophic vaginitis  Discussed that she can decrease dose from 2 g to 1 g twice a week and subsequently 0.5 mg  - estradiol (ESTRACE) 0.01 % (0.1 mg/gram) vaginal cream; INSERT 1 GM INTO THE VAGINA ONCE WEEKLY.  Dispense: 42.5 g; Refill: 1    3. Right leg swelling  Low risk for DVT but will do d-dimer since it is unilateral  - D-dimer, Quantitative    4. Rosacea  - azelaic acid (FINACEA) 15 % gel; Apply small amount to face daily  Dispense: 30 g; Refill: 3    5. Other hyperlipidemia  - Lipid Cascade  - Thyroid Stimulating Hormone (TSH)    6. Vitamin D deficiency  - Vitamin D, Total (25-Hydroxy)    7. Pain in joint, ankle and foot, right  I suspect that the pain is due to previous sprains and recurrent injuries due to weakness in the ankle.  We will do an x-ray and I recommended course of physical therapy.  Also discussed to use ankle brace when she is more physically active.  - XR Ankle Right 3 or More VWS; Future  - Ambulatory referral to PT/OT      Marya Hartley MD    Chief Complaint:  Chief Complaint   Patient presents with     Annual Exam     Joint Swelling     right ankle       History of Present Illness:  Tosha is a 64 y.o. female who is an avid , with hyperlipidemia , atrophic vaginitis, colon polyps, chronic thrombocytopenia, facet arthropathy, number skin tags who is currently here for  a physical.    She notes today that her right ankle has been more swollen lately.  In the past she has had history of spraining her ankle and re-spraining it but no recent injury to trigger persistent swelling.  She denies any decreased range of motion or pain in the ankle joint or pain with weightbearing.  No calf tenderness.    Blood pressure today slightly borderline.  Discussed that the goal would be less than 130/80.  She does not  "drink alcohol but does sporadically consume salted nuts and we discussed to cut back on salt.  Weight loss could also help.    Review of Systems:  A comprehensive review of systems was performed and was otherwise negative.  She is physically active and denies any chest pains palpitations or dizziness.  No shortness of breath with exertion.  No bowel  problems.  No vaginal spotting.    PFSH:  Social History: Reviewed  Social History     Tobacco Use   Smoking Status Never Smoker   Smokeless Tobacco Never Used     Social History     Social History Narrative    Lives with her , Srini.  Works for [employer] .  Two daughters (Beti) and Maegan ([facility] [facility], [facility]).         Past History: Viewed  Current Outpatient Medications   Medication Sig Dispense Refill     calcium-vitamin D3-vitamin K 500-100-40 mg-unit-mcg Chew Chew 1 tablet.       cholecalciferol, vitamin D3, 1,000 unit capsule Take 1,000 Units by mouth daily.       multivitamin (ONE A DAY) per tablet Take 1 tablet by mouth.       azelaic acid (FINACEA) 15 % gel Apply small amount to face daily 30 g 3     diclofenac sodium (VOLTAREN) 1 % Gel Apply 1 gm 3 times a day. 100 g 3     estradiol (ESTRACE) 0.01 % (0.1 mg/gram) vaginal cream INSERT 1 GM INTO THE VAGINA ONCE WEEKLY. 42.5 g 1     No current facility-administered medications for this visit.        Family History: Reviewed    Physical Exam:    Vitals:    07/12/19 1138   BP: 136/74   Patient Site: Left Arm   Patient Position: Sitting   Cuff Size: Adult Regular   Pulse: 76   SpO2: 99%   Weight: 175 lb 1 oz (79.4 kg)   Height: 5' 7\" (1.702 m)     Wt Readings from Last 3 Encounters:   07/12/19 175 lb 1 oz (79.4 kg)   05/28/19 176 lb 11.2 oz (80.2 kg)   02/14/19 178 lb (80.7 kg)     Body mass index is 27.42 kg/m .    Constitutional:  Reveals a pleasant female.  Vitals:  Per nursing notes.  HEENT:No cervical LAD, no thyromegaly,  conjunctiva is pink, no scleral icterus, TMs are visualized and normal " bl, oropharynx is clear, no exudates,   Cardiac:  Regular rate and rhythm,no murmurs, rubs, or gallops.. Legs without edema. Palpation of the radial pulse regular.  Lungs: Clear to auscultation bl.  Respiratory effort normal.  Abdomen:positive BS, soft, nontender, nondistended.  No hepato-splenomagaly  Skin:   Without rash, bruise, or palpable lesions.  She does have numerous skin tags and numerous seborrheic keratosis.  Rheumatologic: She does have slightly more swelling around her right ankle and dorsum of the food but range of motion in the ankle is normal, there is slight tenderness to palpation over the tarsal tunnel.  Neurologic:  Cranial nerves II-XII intact.     Psychiatric: affect appropriate, memory intact.   Breast exam: Gudino lymphadenopathy, breast masses or skin changes appreciated      Data Review:    Analysis and Summary of Old Records (2): yes      Records Requested (1): no      Other History Summarized (from other people in the room) (2): no    Radiology Tests Summarized (XRAY/CT/MRI/DXA) (1): no    Labs Reviewed (1): yes    Medicine Tests Reviewed (EKG/ECHO/COLONOSCOPY/EGD) (1): colo    Independent Review of EKG or X-RAY (2): no

## 2021-05-30 NOTE — PROGRESS NOTES
[facility]   Foot/Ankle Initial Evaluation    Patient Name: Tosha Rosales  Date of evaluation: 7/24/2019  Referral Diagnosis: Pain in joint, ankle and foot, right [M25.571]   Referring provider: Marya Hartley MD  Visit Diagnosis:     ICD-10-CM    1. Right ankle instability M25.371    2. Bilateral lower extremity edema R60.0    3. Generalized muscle weakness M62.81    4. Ankle stiffness, right M25.671    5. Ankle stiffness, left M25.672        Assessment:      Impairments in  ROM, joint mobility, strength, swelling  The POC is dynamic and will be modified on an ongoing basis.  Barriers to achieving goals as noted in the assessment section may affect outcome.  Prognosis to achieve goals is  fair   Pt. is appropriate for skilled PT intervention as outlined in the Plan of Care (POC).  Pt. is a good candidate for skilled PT services to improve pain levels and function.     Tosha Rosales is a 64 y.o. female who presents to therapy today with chief complaints of venkat LE swelling R>L and ankle pain. Onset date of sx was around Feb 2019 with insidious onset.  Pain symptoms are minimal but typically at the end of the day when her ankles are more swollen- it leads to discomfort in her LEs.  Functional impairments include standing and walking for prolonged periods of time.  At this point, pt's symptoms are inconsistent with a musculoskeletal diagnosis. She does have decreased ankle ROM and strength, + anterior drawer on R but it does not explain the LE edema. Provider is going to reach out to Dr. Hartley about obtaining a referral to the [facility] to further evaluate LE edema. PT will continue to work on above impairments.         Goals:  Pt. will demonstrate/verbalize independence in self-management of condition in : in other weeks  [place] Self Management Progress Towards Goal Other: 8 weeks  Pt. will be independent with home exercise program in : 4 weeks    No data recorded    Barriers to  "Learning or Achieving Goals:  Co-morbidities or other medical factors.  possible venous insufficiency causing venkat LEs edema at end of day    Patient's expectations/goals are realistic.       Patient educated on and demonstrated understanding of nature of impairment, plan of care, patient role and HEP. Patient compliant with PT and prognosis is good. Patient would benefit from skilled PT to progress and improve range of motion, flexibility and tissue extensibility, joint mobility and pain.       Plan / Patient Instructions:        Plan of Care:   Communication with: Referral Source  Patient Related Instruction: Nature of Condition;Self Care instruction;Posture;Precautions;Basis of treatment;Treatment plan and rationale;Body mechanics;Expected outcome;Next steps  Times per Week: 1  Number of Weeks: 8  Number of Visits: 8  Discharge Planning: when goals are met or pt has reached a plateu with PT   Therapeutic Exercise: ROM;Stretching;Strengthening  Neuromuscular Reeducation: kinesio tape;posture;balance/proprioception;TNE;core  Manual Therapy: soft tissue mobilization;myofascial release;joint mobilization;muscle energy  Equipment: theraband      Plan for next visit: review HEP and progress with balance and intrinsic strengthening, assess response to KTape and compression sleeves     Subjective:           History of Present Illness:    Tosha is a 64 y.o. female who presents to therapy today with complaints of R ankle pain and swelling. Date of onset/duration of symptoms is 2019. Pt reports a lifelong history of \"being a klutz\" and has sprained her ankle throughout her life. Pt is unsure if there was a specific incident that caused the pain/swelling. She is can all of usual activities without pain but has swelling by the end of her day. It is mostly her R foot but will have less swelling on the L side.  She gets uncomfortable later in the day because her shoes don't fit due to the swelling.     Pain Ratin  Pain " rating at best: 0  Pain rating at worst: 4    Functional limitations are described as occurring with:   Walk on uneven surfaces       Objective:      Note: Items left blank indicates the item was not performed or not indicated at the time of the evaluation.    Patient Outcome Measures :      Lower Extremity Functional Scale (_/80): 75     Scores range from 0-80, where a score of 80 represents maximum function. The minimal clinically important difference is a positive change of 9 points.    Ankle/Foot Examination  1. Right ankle instability     2. Bilateral lower extremity edema     3. Generalized muscle weakness     4. Ankle stiffness, right     5. Ankle stiffness, left       Precautions: None  Involved Side: Bilateral  Assistive Device: None  Gait Observation: non-antalgic gait pattern  Ankle Circumference: L 50 cm and R 52 cm    Anterior drawer test: R + L-    Foot/Ankle ROM:        Date: 7/24/2019  Ankle AROM ( )   Right    Left   Right   Left   Right   Left   Dorsiflexion-Gastroc (10 )   0   0               Dorsiflexion-Soleus (20 )                     Plantar Flexion (50 ) 60   60               Inversion (45-60 )   WNL   WNL               Eversion (15-30 )   limited   limited               Great Toe Extension (70 )                     Great Toe Flexion (MTP 45 , IP 90 )                     Ankle PROM ( )   Right   Left   Right   Left   Right   Left   Dorsiflexion-Gastroc (10 )                     Dorsiflexion-Soleus (20 )                     Plantar Flexion (50 )                     Inversion (45-60 )                     Eversion (15-30 )                     Great Toe Extension (70 )                     Great Toe Flexion (MTP 45 , IP 90 )                        Foot/Ankle Strength:         Date:  7/24/2019   Ankle/Foot MMT (/5)   Right   Left   Right   Left   Right   Left   Dorsiflexion 5 5       Plantar flexion (8 reps)3 4       Inversion 5 5       Eversion 5 5       Great Toe Extension           Lower  Extremity Strength:  Date: 7/24/2019     LE strength/5 Right Left Right Left Right Left   Hip Flexion (L1-3)         Hip Extension (L5-S1) 2+ 4       Hip Abduction (L4-5)         Hip Adduction (L2-3)         Hip External Rotation         Hip Internal Rotation         Knee Extension (L3-4)         Knee Flexion         Ankle Dorsiflexion (L4-5)         Great Toe Extension (L5)         Ankle Plantar flexion (S1)         Abdominals          Treatment Today     TREATMENT MINUTES COMMENTS   Evaluation 25    Self-care/ Home management 10 -provided with F tubigrip to wear at night for compression  -educated on referral to Vascular Center to rule out venous insufficiency- provider with reach out to Dr. Hartley to place referral  -educated on elevating feet during day as able and use of ankle pumps to help reduce end of day swelling     Manual therapy     Neuromuscular Re-education     Therapeutic Activity     Therapeutic Exercises 25 -see exercise flow sheet for date completed  -educated on POC, diagnosis and HEP  -KTape for edema management with 2 Fan strips over venkat anterior ankles starting at distal tibia, skin prepped and reviewed indications/precautions   Gait training     Modality__________________                Total 60    Blank areas are intentional and mean the treatment did not include these items.     PT Evaluation Code: (Please list factors)  Patient History/Comorbidities: see above  Examination: ankles  Clinical Presentation: stable  Clinical Decision Making: low    Patient History/  Comorbidities Examination  (body structures and functions, activity limitations, and/or participation restrictions) Clinical Presentation Clinical Decision Making (Complexity)   No documented Comorbidities or personal factors 1-2 Elements Stable and/or uncomplicated Low   1-2 documented comorbidities or personal factor 3 Elements Evolving clinical presentation with changing characteristics Moderate   3-4 documented comorbidities  or personal factors 4 or more Unstable and unpredictable High              Kavya Holland, PT, DPT  7/24/2019  10:03 AM

## 2021-05-31 VITALS — BODY MASS INDEX: 27.88 KG/M2 | WEIGHT: 178 LBS

## 2021-05-31 VITALS — WEIGHT: 178 LBS | HEIGHT: 67 IN | BODY MASS INDEX: 27.94 KG/M2

## 2021-05-31 VITALS — WEIGHT: 182 LBS | BODY MASS INDEX: 28.56 KG/M2 | HEIGHT: 67 IN

## 2021-05-31 NOTE — PROGRESS NOTES
[facility] Daily Progress     Patient Name: Tosha Rosales  Date: 8/13/2019  Visit #: 2  PTA visit #:  na  Referral Diagnosis:Pain in joint, ankle and foot, right [M25.571]   Referring provider: Marya Hartley MD  Visit Diagnosis:     ICD-10-CM    1. Right ankle instability M25.371    2. Bilateral lower extremity edema R60.0    3. Generalized muscle weakness M62.81    4. Ankle stiffness, right M25.671    5. Ankle stiffness, left M25.672        Assessment:     HEP/POC compliance is  good .  Patient demonstrates understanding/independence with home program.  Response to Intervention Pt reports improvements in her edema with the KTape and exercises. Tolerated HEP progression to standing and furhter foot intrinsic strengthening.  Patient is benefitting from skilled physical therapy and is making steady progress toward functional goals.  Patient is appropriate to continue with skilled physical therapy intervention, as indicated by initial plan of care.    Goal Status:  Pt. will demonstrate/verbalize independence in self-management of condition in : in other weeks  [place] Self Management Progress Towards Goal Other: 8 weeks  Pt. will be independent with home exercise program in : 4 weeks    No data recorded    Plan / Patient Education:     Continue with initial plan of care.  Progress with home program as tolerated. Avoid compression socks at night    Subjective:       She was working in the garden this morning so it was a little more swollen. Overall, the tape and exercises are helping a little bit. She does have a follow up with Dr. Harper in October. She has been using the KTape and bought a pair of mild compression socks that are uncomfortable.       Objective:     SLS: EO: 30 sec B, EC<5 sec     Exercises:  Exercise #1: standing gastroc stretch 2 x 30 sec hold B  Comment #1: venkat heel raises 30 reps   Exercise #2: ankle eversion with L3 band - switched to towel scrunch on R  Comment #2: toe  yoga: big toe lifts, 4 outs toe lifts, spawling toes out  Exercise #3: SLS 30 sec with EC, intermittent UE support        Treatment Today     TREATMENT MINUTES COMMENTS   Evaluation     Self-care/ Home management     Manual therapy     Neuromuscular Re-education     Therapeutic Activity     Therapeutic Exercises 27 -see exercise flow sheet for date completed  -educated pt to skip compression socks at night (uncomfortable and were not helpful) can try wearing them during the day or sticking to just the KTape and exercises   Gait training     Modality__________________                Total 27    Blank areas are intentional and mean the treatment did not include these items.       Kavya Holland, PT, DPT  8/13/2019

## 2021-05-31 NOTE — TELEPHONE ENCOUNTER
Please let pt know,  If swelling in the ankle is not better, she should try compression stalking. Her PT also suggested referral to vascular center to check for venous insuficiency, if she would like to be seen there we could refer.    Dr PATLE

## 2021-05-31 NOTE — PROGRESS NOTES
"[facility] Daily Progress / Discharge Summary    Patient Name: Tosha Rosales  Date: 9/3/2019  Visit #: 3  PTA visit #:  na  Referral Diagnosis:Pain in joint, ankle and foot, right [M25.571]   Referring provider: Marya Hartley MD  Visit Diagnosis:     ICD-10-CM    1. Right ankle instability M25.371    2. Bilateral lower extremity edema R60.0    3. Generalized muscle weakness M62.81    4. Ankle stiffness, right M25.671    5. Ankle stiffness, left M25.672        Assessment:     HEP/POC compliance is  good .  Patient demonstrates understanding/independence with home program.  Response to Intervention Pt reports increased edema with standing at the [facility] but states it improves quickly. She is going to follow up with Vascular and will continue with PT after if needed.  Patient is benefitting from skilled physical therapy and is making steady progress toward functional goals.  Patient is appropriate to continue with skilled physical therapy intervention, as indicated by initial plan of care.    Goal Status: Goals MET  Pt. will demonstrate/verbalize independence in self-management of condition in : in other weeks  [place] Self Management Progress Towards Goal Other: 8 weeks  Pt. will be independent with home exercise program in : 4 weeks    No data recorded    Plan / Patient Education:     Has a follow up with Dr. Harper on Oct 7. Return to PT after if needed.     10/28/19: Pt did not return to PT and will be disharged.     Subjective:       She was working at the [facility] and the swelling increased. She was standing on her feet for 5 days and for longer periods of time than usual. Other than that has been doing the exercises. She \"knows what she needs to do and just needs to do it\". The swelling has improved after icing yesterday.       Objective:     SLS: EO: 30 sec B, EC<5 sec     Exercises:  Exercise #1: standing gastroc stretch 2 x 30 sec hold B  Comment #1: venkat heel raises x30 reps "   Exercise #2: towel scrunch on R- verbal review  Comment #2: toe yoga: big toe lifts, 4 outs toe lifts, spawling toes out- verbal review  Exercise #3: SLS 30 sec with EC, intermittent UE support        Treatment Today     TREATMENT MINUTES COMMENTS   Evaluation     Self-care/ Home management     Manual therapy     Neuromuscular Re-education     Therapeutic Activity     Therapeutic Exercises 28 -see exercise flow sheet for date completed  -educated pt to wear compression socks during the day and especially when on her flight tomorrow  -KTape for edema management with 2 Fan strips over venkat anterior ankles starting at distal tibia, skin prepped and reviewed indications/precautions   Gait training     Modality__________________                Total 28    Blank areas are intentional and mean the treatment did not include these items.       Kavya Holland, PT, DPT  9/3/2019

## 2021-06-01 VITALS — HEIGHT: 67 IN | BODY MASS INDEX: 26.95 KG/M2 | WEIGHT: 171.7 LBS

## 2021-06-02 VITALS — BODY MASS INDEX: 27.94 KG/M2 | WEIGHT: 178 LBS | HEIGHT: 67 IN

## 2021-06-02 VITALS — WEIGHT: 178 LBS | BODY MASS INDEX: 27.88 KG/M2

## 2021-06-02 NOTE — TELEPHONE ENCOUNTER
----- Message from Heide Harper MD sent at 10/28/2019  1:01 PM CDT -----  Please let the patient know her venous study was fine.  She should continue the plan of care as outlined at her last visit.

## 2021-06-02 NOTE — PROGRESS NOTES
Referred By: Marya Hartley MD    Date Of Service: 10/07/2019    Chief Complaint: Bilateral leg swelling, mainly on right.     History of Present Illness:    Tosha Rosales presents to the [facility] as a new consult to evaluate bilateral leg swelling.  The swelling began last winter with the right leg suddenly getting swollen.  She eventually saw her PCP and had XR and therapy.  Ankle exercises have helped a lot.  There was no associated trauma, medication change or major illness.  She has been diagnosed with some osteoarthritis.  She has been seen by Dr. Headley and ruled out for inflammatory joint disease.   Previous treatments have included compression socks, which helped when worn during day.  The swelling improved since onset.  Pain is rated a 2 on a 10 point scale.  Pain is described as achey.  The swelling is improved with elevation and ice. The patient sleeps in a bed. There has been no new numbness, tingling or weakness.  There have been no new masses, rashes, or swellings of any other joints. There has been no new decrease in appetite, unexplained weight loss, abdominal bloating, bowel or bladder changes and irregular bleeding.  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    Past Medical History:   Diagnosis Date     Osteoarthritis      Polyarthralgia        Past Surgical History:   Procedure Laterality Date     BREAST BIOPSY Left 2004     DIAGNOSTIC LAPAROSCOPY      infertility     DILATION AND CURETTAGE OF UTERUS           Current Outpatient Medications:      azelaic acid (FINACEA) 15 % gel, Apply small amount to face daily, Disp: 30 g, Rfl: 3     cholecalciferol, vitamin D3, 1,000 unit capsule, Take 1,000 Units by mouth daily., Disp: , Rfl:      estradiol (ESTRACE) 0.01 % (0.1 mg/gram) vaginal cream, INSERT 1 GM INTO THE VAGINA ONCE WEEKLY., Disp: 42.5 g, Rfl: 1     multivitamin (ONE A DAY) per tablet, Take 1 tablet by mouth.,  Disp: , Rfl:     Allergies   Allergen Reactions     Codeine Unknown       Social History     Socioeconomic History     Marital status:      Spouse name: Not on file     Number of children: Not on file     Years of education: Not on file     Highest education level: Not on file   Occupational History     Not on file   Social Needs     Financial resource strain: Not on file     Food insecurity:     Worry: Not on file     Inability: Not on file     Transportation needs:     Medical: Not on file     Non-medical: Not on file   Tobacco Use     Smoking status: Never Smoker     Smokeless tobacco: Never Used   Substance and Sexual Activity     Alcohol use: Yes     Alcohol/week: 6.0 standard drinks     Types: 2 Glasses of wine, 4 Standard drinks or equivalent per week     Comment: moderately     Drug use: No     Comment: rarely      Sexual activity: Yes     Partners: Male   Lifestyle     Physical activity:     Days per week: Not on file     Minutes per session: Not on file     Stress: Not on file   Relationships     Social connections:     Talks on phone: Not on file     Gets together: Not on file     Attends Restorationism service: Not on file     Active member of club or organization: Not on file     Attends meetings of clubs or organizations: Not on file     Relationship status: Not on file     Intimate partner violence:     Fear of current or ex partner: Not on file     Emotionally abused: Not on file     Physically abused: Not on file     Forced sexual activity: Not on file   Other Topics Concern     Not on file   Social History Narrative    Lives with her , Srini.  Works for [employer].  Two daughters (Beti) and Maegan (adopted, [place]).         Family History   Problem Relation Age of Onset     Dementia Mother      Depression Mother      Dementia Father      Stroke Father      Brain cancer Sister         astrocytoma     Cancer Sister         brain cancer: glioblastoma     No Medical  Problems Brother      No Medical Problems Sister      No Medical Problems Brother      No Medical Problems Brother      No Medical Problems Daughter      Breast cancer Maternal Grandmother 85       Review of Systems:  Review of systems is otherwise negative, except as noted above.  Full 12 point review of systems was completed.    Radiology/Diagnostic Studies:    I personally reviewed the following imaging today and those on care everywhere, if indicated    EXAM: XR ANKLE RIGHT 3 OR MORE VWS  LOCATION: [facility]  DATE/TIME: 7/12/2019 12:22 PM     INDICATION: persistent swelling, eval for pathology  COMPARISON: None.     FINDINGS: No fracture or dislocation. Ankle mortise intact.       US KIDNEY BILATERAL WITH BLADDER  6/13/2018 10:58 AM     INDICATION: F/u left renal cyst seen on mri 2017  TECHNIQUE: Routine kidneys and bladder.  COMPARISON: MRI lumbar spine 06/13/2017     FINDINGS:  Right kidney measures 11.4 cm. No hydronephrosis. No mass. Normal renal echotexture.     Left kidney measures 10.3 cm. No hydronephrosis. 6.8 x 6.3 cm simple appearing cyst lower pole left kidney corresponds to findings on MRI. This should be considered benign. No follow-up needed.     Bladder is normal.     IMPRESSION:   CONCLUSION:  1.  Since prior MRI lumbar spine, Stable 6.8 cm simple appearing cyst lower pole left kidney should be considered benign. No follow-up needed.     2.  No other significant findings.      Laboratory Studies:  I personally reviewed the following labs today and those on care everywhere, if indicated      Lab Results   Component Value Date    SEDRATE 10 12/03/2018         No results found for: CRP        Lab Results   Component Value Date    CREATININE 0.78 12/03/2018      Lab Results   Component Value Date    HGBA1C 5.8 06/06/2017           Lab Results   Component Value Date    BUN 16 12/03/2018              Lab Results   Component Value Date    ALBUMIN 4.3 06/07/2018       Vitamin D, Total (25-Hydroxy)    Date Value Ref Range Status   07/12/2019 33.7 30.0 - 80.0 ng/mL Final       Lab Results   Component Value Date    TSH 0.73 07/12/2019     Lab Results   Component Value Date    WBC 6.7 12/03/2018    HGB 13.3 12/03/2018    HCT 39.1 12/03/2018    MCV 91 12/03/2018     12/03/2018       Physical Exam:  Vitals:    10/07/19 1128   BP: 136/84   Pulse: 64   Resp: 16   Temp: 98.1  F (36.7  C)     BMI 27.41  Weight 175 pounds    See flow chart for circumferential measures.    Vasc Edema 10/7/2019   Right just above MTP 23.2   Right Ankle 23   Right Widest Calf 40.7   Right Thigh Up 10cm 49.1   Left - just above MTP 23   Left Ankle 22.5   Left Widest Calf 40.7   Left Thigh Up 10cm 48.2       General:  64 y.o. female in no apparent distress.      Psych: Alert and oriented x 3.  Cooperative. Affect normal.    HEENT: Atraumatic, normocephalic    Respiratory:  Lungs are clear to auscultation throughout with full inspiration    Cardiovascular: Normal S1, S2 without murmur, gallop or rub.  No audible carotid bruits. Regular rhythm.     Abdominal: Normal bowel sounds without any pain, guarding or rigidity. No inguinal lymphadenopathy palpated.    Musculoskeletal:  Normal range of motion in hips, knees and ankles bilaterally throughout.  There is no active joint synovitis, erythema, swelling or joint laxity.  Slight flattening of arches with ambulation.  High arches present.     Neurological:  Sensation is intact to pin prick and light touch in both legs.  Strength testing is normal in hip flexion, knee flexion, knee extension, ankle dorsiflexion and great toe extension bilaterally. Deep tendon reflexes, knee jerks and ankle jerks, are normal bilaterally.      Vascular: Dorsalis pedis and posterior tibialis pulses are strong and equal bilaterally and reveal audible biphasic waves with a hand held doppler bilaterally. There are slight telangietasias, medial ankle venous flares, venous varicosities  and spider veins .      Integumentary: Skin of the legs is uniformly warm and dry and with negative Stemmer's Sign.  Nails are normal      Impression:    1. Bilateral leg swelling and some pain  2. Venous hypertension with venous insufficiency of both legs  3. Pes planus of both feet    Plan:  1. Type of swelling was reviewed in detail with the patient.  Questions were answered and education was completed.  2. I suspect most of her problems are from venous insufficiency.  She most likely had a right ankle injury which now appears to be under good control.  I will check a venous insufficiency studies of both legs to see if anything further would be helpful to address this.  3. I encouraged her to continue doing her right ankle exercises long-term.  4. I would like to see if we can get her some good new insoles.  We talked about the importance of wearing these regularly and also increasing her program.  Her body is used to being very athletic and she has cut down on this over the years.  This is especially important in view of her family history of Alzheimer's on both sides.  5. Patient will follow up in 3 months or when needed.     Thank you very much for referring Tosha Rosales.  If you have any questions please feel free to contact me at [phone].    Time spent with patient 60 minutes with greater than 50% time in consultation, education and coordination of care, excluding procedures.     Heide Harper MD, ABWMS, FACCWS, [facility]  Medical Director Wound Care and Lymphedema  [facility]  [phone]    This note was dictated using a voice recognition software.  Any grammatical or context distortion are unintentional and inherent to the software.

## 2021-06-02 NOTE — PROGRESS NOTES
"S: Patient is a 65 y/o female, 5'7\", 175 lbs seen at our [facility] for the evaluation and casting for custom foot orthotics on orders from Dr. Heide Harper MD for the treatment of Dx: Pain of right lower extremity [M79.604] and Pes planus of both feet [M21.41, M21.42]     O: Patient c/o pain in both her feet that is generally located in her longitudinal arch. Patient displays valgus angulation of her ankle, pronation deformity and pes cavus bilaterally. Patient ROM and MMT scores are all WNL for foot and ankle bilaterally.    G: Patient's custom FOs will support and stabilize her foot and ankle complex into a more biomechanically beneficial position in order to help reduce her painful symptoms. Patient requires customization due to the inherent angular deformities of her foot and ankle.     A: I took bilateral biofoam impressions of the patient's feet for the fabrication of custom Fos. Patient's Fos will be fabricated with a base of performance RX, mid layer of Matte poron and top cover of [place] with the addition of a metatarsal pad.     P: Patient was asked to schedule a fitting appointment in two weeks time as she left our office.  "

## 2021-06-02 NOTE — PROGRESS NOTES
S: Patient was seen in [place] to be measured for knee-high compression stockings 20-30 mmHg. RX is on-file from Dr. Harper.    O: Goal is to evaluate and measure patient for a compression garment that will help control her bilateral leg swelling, venous hypertension, and venous insufficiency. Observations show there are apparent varicose veins to her bilateral lower extremities. The expected outcome with compliant use is to reduce swelling and control the patient s condition.     A: Tosha has been wearing thin hosiery-type stockings from [employer] in a 15-20 mmHg compression. She liked the [employer] Activewear and [employer] Relief stockings I showed her from my swatches. I took measurements for these stockings (Medium, Regular) and will order one pair of [employer] Activewear knee-highs 20-30 mmHg in white, and one pair of [employer] Relief knee-highs 20-30 mmHg in black. Order submitted for fabrication to [employer].     P: I have the patient scheduled for a fitting appointment on Friday, 11/8/19 at 1:30 pm right after her appnt with Suraj in [place].

## 2021-06-02 NOTE — PATIENT INSTRUCTIONS - HE
"Swelling in the legs can be caused by many reasons. No matter what the reason, treatment usually includes some type of compression. You should wear your compression socks as much as you can. Your compression should be put on first thing in the morning. Take the compression off at night. It is especially important to wear them with long periods of sitting/standing, long car rides or if you will be flying. Going without compression for even brief periods of time can be damaging to your legs and your health.  Compression socks should get replaced usually every 4-6 months. They do not need to be worn at night while in bed. If we have seen you in the last year, we can refill your sock prescription, otherwise your primary care provider is able to refill them for you. Call us with any problems or questions.   Compression Velcro may need to be replaced every 9-12 months.  Often the liners and socks need to be replaced every three or four months.  The neoprene velcro may last up to 2 years.  If the velcro becomes worn a tailor may be able to repair it for you inexpensively.    If you do a lot of standing, it is good to do calf raises to help keep the blood pumping. If you sit a lot at work, it is good to get up periodically to walk around. Elevation of the foot of your bed 4-6\" helps the blood return back to where it is needed.   Please call us if you have any questions [phone]    Thank you for choosing [facility].      For compression stockings and custom foot orthotics:  [facility] (Please call to make an appointment)    [facility]  [address], [address]  [place], [place].  Phone: [phone]    [facility]  [address]  [place] [address]  [phone]        Ultrasound    Thank you for choosing [facility] as partners in your care.  Please read the following information before your " appointment.  Feel free to ask questions.    An ultrasound is an exam that uses sound waves to create pictures.  The special camera that takes the pictures is called a transducer.  An ultrasound technologist will perform the exam under the direction of a physician.    Preparation  Ultrasound preps vary.  If you are scheduled for an Aorta Ultrasound, please do not eat or drink anything 8 hours before your exam.  You may take daily medications with a small sip of water.  There is no preparation required for any of the other ultrasound exams performed at [facility].    The Examination  You may or may not need to change clothes for your exam.  The technologist will explain the exam to you.  He or she will ask you a few questions and answer any questions you may have.    You will lie on a table and a gel will be applied to your skin.  A small handheld instrument called a transducer will be moved across the area we are looking at while pictures are taken.  Also, your exam may include measuring your blood pressure on your arms, legs and/or toes.    When the Exam Is Completed  Your physician will receive the results of the exam and explain them to you.  You may return to your normal diet and activities unless otherwise directed by your doctor.  Feel free to ask questions if something is not clear to you.  We welcome comments.    At [facility], we are dedicated to providing the best possible care.  Thank you for taking time to read these instructions.  We hope your experience is as pleasant as possible.      You are scheduled for the following exam(s):    []Carotid Duplex:  Evaluates the carotid arteries in the neck that feed the brain with blood.  Gel is applied to the skin of the neck.  A transducer will be placed on the gel covered areas to obtain images and evaluate and listen to the blood flow in the arteries.  This exam takes approximately 30 minutes.    [x]Venous Duplex:  Evaluates the veins that carry  blood to the heart from the arms and/or legs.  Gel is applied to the skin of the arms and/or legs.  A transducer will be placed on the gel covered areas to obtain images and evaluate flow in the veins.  This exam takes approximately 30 minutes per arm/leg.    []Arterial Duplex:  Evaluates the arteries that feed the arms and legs with blood.  Gel is applied to the skin of the arms and/or legs.  A transducer will be placed on the gel covered areas to obtain images and listen to the blood flow in the arms and/or legs.  This exam takes approximately 30 minutes per arm/leg.    []RAINA or Segmental Pressures:  Ultrasound and blood pressure cuffs are used to evaluate the arteries that supply the arms and legs with blood.  Several blood pressure cuffs will be place on your arms and legs.  When inflated, the cuffs will provide blood pressure readings that are used to determine where blockages in the arteries may be.  You may be asked to do a moderate level of exercise during this exam.  This exam takes approximately 30-60 minutes.    []Aorta:  Evaluates the abdominal aorta for blockages and aneurysm.  Gel is applied to the stomach.  A transducer will be placed on the gel covered areas to obtain images of the aorta.  This exam takes approximately 30 minutes.    Prep instructions:  Do not eat or drink anything 8 hours before procedure.  You may take daily medications with a small sip of water.

## 2021-06-02 NOTE — PROGRESS NOTES
Pt noted vleft ankle swollen past 10 months, and it was swollen more in the morning then whole. She done therapy for 6 weeks; states that it helped a lot; she was told that she had ankle injury. No pain,tingling or numbness, she has been wearing compression socks , and she liked them; states Pt.

## 2021-06-03 VITALS
WEIGHT: 175 LBS | SYSTOLIC BLOOD PRESSURE: 136 MMHG | RESPIRATION RATE: 16 BRPM | DIASTOLIC BLOOD PRESSURE: 84 MMHG | HEART RATE: 64 BPM | BODY MASS INDEX: 27.47 KG/M2 | HEIGHT: 67 IN | TEMPERATURE: 98.1 F

## 2021-06-03 VITALS — HEIGHT: 67 IN | WEIGHT: 176.7 LBS | BODY MASS INDEX: 27.73 KG/M2

## 2021-06-03 VITALS — WEIGHT: 175.06 LBS | HEIGHT: 67 IN | BODY MASS INDEX: 27.48 KG/M2

## 2021-06-03 NOTE — PROGRESS NOTES
S: I have been hanging onto Tosha's knee-high compression stockings 20-30 mmHg. RX on-file is current.    A: I assisted Tosha in donning her compression garments. Once donned, the garments appeared to be fitting well and she stated they were comfortable. She was instructed on the donning and doffing process, the uses of the garments, and how to care for the items. She went home with two pairs of [employer]st knee-highs 20-30 mmHg (size medium, Std): Activewear in white and Relief in black.    P: She is to call with any further needs.  Goal is to maintain a home program.

## 2021-06-03 NOTE — PROGRESS NOTES
"S: Patient is a 63 y/o female, 5'7\", 175 lbs seen at our [facility] for the fitting and delivery of her custom foot orthotics on orders from Dr. Heide Harper MD for the treatment of Dx: Pain of right lower extremity [M79.604] and Pes planus of both feet [M21.41, M21.42]     O: Patient c/o pain in both her feet that is generally located in her longitudinal arch. Patient displays valgus angulation of her ankle, pronation deformity and pes cavus bilaterally. Patient ROM and MMT scores are all WNL for foot and ankle bilaterally.    G: Patient's custom FOs will support and stabilize her foot and ankle complex into a more biomechanically beneficial position in order to help reduce her painful symptoms. Patient requires customization due to the inherent angular deformities of her foot and ankle.     A: Patient tried on her custom FOs in her shoes as they were MPJ length and required no trimming. patient was happy with the fit and function of her FOs immediately. Patient and I discussed care, use and break in period necessary for her new FOs. Patient reported she understood all instructions and had no further questions at the end of our appointment.    P: Patient was asked to call our office if there are any issues with her FOs in the future.   "

## 2021-06-04 VITALS
BODY MASS INDEX: 27.31 KG/M2 | DIASTOLIC BLOOD PRESSURE: 80 MMHG | RESPIRATION RATE: 20 BRPM | SYSTOLIC BLOOD PRESSURE: 112 MMHG | TEMPERATURE: 97.6 F | HEIGHT: 67 IN | WEIGHT: 174 LBS | HEART RATE: 68 BPM

## 2021-06-05 VITALS
HEIGHT: 67 IN | HEART RATE: 80 BPM | SYSTOLIC BLOOD PRESSURE: 124 MMHG | RESPIRATION RATE: 20 BRPM | BODY MASS INDEX: 27.31 KG/M2 | DIASTOLIC BLOOD PRESSURE: 74 MMHG | WEIGHT: 174 LBS | OXYGEN SATURATION: 99 %

## 2021-06-05 NOTE — PATIENT INSTRUCTIONS - HE
"Swelling in the legs can be caused by many reasons. No matter what the reason, treatment usually includes some type of compression. sabra or if you will be flying. Going without compression for even brief periods of time can be damaging to your legs and your health.  Compression socks should get refilled every 4-6 months. They do not need to be worn at night while in bed. We can refill your sock prescription for 1 year otherwise your primary is able to refill them for you. Call us with any problems or questions. If you do a lot of standing, it is good to do calf raises to help keep the blood pumping. If you sit a lot at work, it is good to get up periodically to walk around. Elevation of the foot of your bed 4-6\" helps the blood return back to where it is needed.         Please call us if you have any questions [phone]    Thank you for choosing   [email or web address].              "

## 2021-06-05 NOTE — PROGRESS NOTES
Pt continues to wear bilateral knee high compression; no complaining of pain or being discomfort. Both leg has decreased in size.

## 2021-06-05 NOTE — PROGRESS NOTES
Date of Service: 01/09/2020     Date last seen by Dr. Harper:  10/7/2019    PCP: Marya Hartley MD      Impression:    1. Bilateral leg swelling and some pain-much improved and pain resolved  2. Venous hypertension with venous insufficiency of both legs  3. Pes planus of both feet    Plan:  1. Questions were answered.  2. Venous insufficiency study reviewed.  3. Continue insoles, compression and exercise.   4. Patient will follow up in 8 months or when needed.     Time spent with patient 15 minutes with greater than 50% time in consultation, education and coordination of care, excluding procedures.     ---------------------------------------------------------------------------------------------------------------------    Chief Complaint: Bilateral leg swelling, mainly on right.     History of Present Illness:    Tosha Rosales presents to the [facility] for bilateral leg swelling due to venous hypertension with venous insufficiency of both legs complicated by pes planus of both feet.  When she was here for her first consultation venous insufficiency study was ordered to check status of the veins.  Venous insufficiency study was essentially normal.  I also ordered good insoles for her to better protect the musculoskeletal system and increase her ability to exercise which is very important to her.  She is here for follow-up.  She reports she is feeling much better.  The socks are working well as are the insoles.  She is starting to exercise again.  Her pain has essentially resolved.  There has been no new numbness, tingling or weakness.  There have been no new masses, rashes, or swellings of any other joints. There has been no new decrease in appetite, unexplained weight loss, abdominal bloating, bowel or bladder changes and irregular bleeding.       Past Medical History:   Diagnosis Date     Osteoarthritis      Polyarthralgia        Past Surgical History:   Procedure  Laterality Date     BREAST BIOPSY Left 2004     DIAGNOSTIC LAPAROSCOPY      infertility     DILATION AND CURETTAGE OF UTERUS           Current Outpatient Medications:      azelaic acid (FINACEA) 15 % gel, Apply small amount to face daily, Disp: 30 g, Rfl: 3     cholecalciferol, vitamin D3, 1,000 unit capsule, Take 1,000 Units by mouth daily., Disp: , Rfl:      estradiol (ESTRACE) 0.01 % (0.1 mg/gram) vaginal cream, INSERT 1 GM INTO THE VAGINA ONCE WEEKLY., Disp: 42.5 g, Rfl: 1     multivitamin (ONE A DAY) per tablet, Take 1 tablet by mouth., Disp: , Rfl:     Allergies   Allergen Reactions     Codeine Unknown       Social History     Socioeconomic History     Marital status:      Spouse name: Not on file     Number of children: Not on file     Years of education: Not on file     Highest education level: Not on file   Occupational History     Not on file   Social Needs     Financial resource strain: Not on file     Food insecurity:     Worry: Not on file     Inability: Not on file     Transportation needs:     Medical: Not on file     Non-medical: Not on file   Tobacco Use     Smoking status: Never Smoker     Smokeless tobacco: Never Used   Substance and Sexual Activity     Alcohol use: Yes     Alcohol/week: 6.0 standard drinks     Types: 2 Glasses of wine, 4 Standard drinks or equivalent per week     Comment: moderately     Drug use: No     Comment: rarely      Sexual activity: Yes     Partners: Male   Lifestyle     Physical activity:     Days per week: Not on file     Minutes per session: Not on file     Stress: Not on file   Relationships     Social connections:     Talks on phone: Not on file     Gets together: Not on file     Attends [employer] service: Not on file     Active member of club or organization: Not on file     Attends meetings of clubs or organizations: Not on file     Relationship status: Not on file     Intimate partner violence:     Fear of current or ex partner: Not on file     Emotionally  abused: Not on file     Physically abused: Not on file     Forced sexual activity: Not on file   Other Topics Concern     Not on file   Social History Narrative    Lives with her , Srini.  Works for [employer].  Two daughters (Beti) and Maegan (adopted, [place]).         Family History   Problem Relation Age of Onset     Dementia Mother      Depression Mother      Dementia Father      Stroke Father      Brain cancer Sister         astrocytoma     Cancer Sister         brain cancer: glioblastoma     No Medical Problems Brother      No Medical Problems Sister      No Medical Problems Brother      No Medical Problems Brother      No Medical Problems Daughter      Breast cancer Maternal Grandmother 85       Review of Systems:    Tosha Rosales no new numbess, tingling or weakness, redness or rashes, fevers, new masses, abdominal bloating or discomfort, unexplained weight loss, increased pain, new ulcers, shortness of breath and chest pain  Full 12 point review of systems was completed.    Imaging:    I personally reviewed the following imaging results today and those on care everywhere, if indicated    [facility] OUTPATIENT  10/25/2019     EXAM: BILATERAL LOWER EXTREMITY DEEP AND SUPERFICIAL VENOUS DUPLEX ULTRASOUND WITH PHYSIOLOGIC TESTING     INDICATION: Bilateral lower extremity pain and swelling. Symptomatic varicose veins. Assess for incompetent veins.     TECHNIQUE: Supine and upright ultrasound of the deep and superficial veins with Valsalva and compression augmentation maneuvers. Duplex imaging is performed utilizing gray-scale, two-dimensional images, color-flow imaging, Doppler waveform analysis, and   spectral Doppler imaging.      INCOMPETENCY CRITERIA: Deep vein reflux reported when greater than 1,000 ms flow reversal.  Superficial vein reflux reported when greater than 500 ms flow reversal.  vein reflux reported as greater than 350 ms flow reversal.     DEEP VEIN  FINDINGS:     RIGHT LEG: The common femoral, profunda femoral, femoral, popliteal, and visualized calf veins are patent and compressible.  Incompetent right common femoral vein.     LEFT LEG:  The common femoral, profunda femoral, femoral, popliteal, and visualized calf veins are patent and compressible.   No incompetency of the deep left leg veins.     RIGHT SUPERFICIAL VEIN FINDINGS:  GREAT SAPHENOUS VEIN: Competent from the saphenofemoral junction to the mid calf.     SMALL SAPHENOUS VEIN: Competent from the saphenopopliteal junction to the mid calf.     No incompetent perforating veins or abnormal accessory veins identified.     LEFT SUPERFICIAL VEIN FINDINGS:  GREAT SAPHENOUS VEIN: Competent from the saphenofemoral junction to the mid calf.     SMALL SAPHENOUS VEIN: Competent from the saphenopopliteal junction to the mid calf.     No incompetent perforating veins or abnormal accessory veins identified.     IMPRESSION:   CONCLUSION:   1.  No deep venous thrombosis of either lower extremity. No incompetent right common femoral vein.  2.  RIGHT LEG: The right superficial venous system is patent, without incompetence.  3.  LEFT LEG: The left superficial venous system is patent, without incompetence.    Labs:    I personally reviewed the following lab results today and those on care everywhere, if indicated    Lab Results   Component Value Date    SEDRATE 10 12/03/2018         No results found for: CRP        Lab Results   Component Value Date    CREATININE 0.78 12/03/2018      Lab Results   Component Value Date    HGBA1C 5.8 06/06/2017           Lab Results   Component Value Date    BUN 16 12/03/2018              Lab Results   Component Value Date    ALBUMIN 4.3 06/07/2018       Vitamin D, Total (25-Hydroxy)   Date Value Ref Range Status   07/12/2019 33.7 30.0 - 80.0 ng/mL Final       Lab Results   Component Value Date    TSH 0.73 07/12/2019     Lab Results   Component Value Date    WBC 6.7 12/03/2018    HGB 13.3  12/03/2018    HCT 39.1 12/03/2018    MCV 91 12/03/2018     12/03/2018         Physical Exam:  Vitals:    01/09/20 1026   BP: 112/80   Pulse: 68   Resp: 20   Temp: 97.6  F (36.4  C)      BMI 27.25 weight 174 pounds    Circumferential measures:    Vasc Edema 10/7/2019 1/9/2020   Right just above MTP 23.2 22.7   Right Ankle 23 22.2   Right Widest Calf 40.7 38   Right Thigh Up 10cm 49.1 -   Left - just above MTP 23 22.5   Left Ankle 22.5 21   Left Widest Calf 40.7 38   Left Thigh Up 10cm 48.2 -     Circumferential measures slightly decreased.    General:  64 y.o. female in no apparent distress.      Psych: Alert and oriented x 3.  Cooperative. Affect normal.    HEENT: Atraumatic, normocephalic    Musculoskeletal:  Normal range of motion in ankles bilaterally without joint synovitis, erythema, swelling or joint laxity.     Neurological:  Sensation is intact to pin prick and light touch in both legs.  Strength testing is normal in ankle dorsiflexion and great toe extension bilaterally.       Vascular: Dorsalis pedis and posterior tibialis pulses are strong and equal bilaterally. There are slight telangietasias, medial ankle venous flares, venous varicosities or spider veins .     Integumentary: Skin of the legs is uniformly warm and dry and with negative Stemmer's Sign.  Nails are normal      Heide Harper MD, FABWMS, FACCWS, FAAPMR  Medical Director Wound Care and Lymphedema  [facility]  [phone]      This note was dictated using a voice recognition software.  Any grammatical or context distortion are unintentional and inherent to the software.

## 2021-06-09 NOTE — TELEPHONE ENCOUNTER
RN cannot approve Refill Request    RN can NOT refill this medication Protocol failed and NO refill given. Last office visit: 12/3/2018 Marya Hartley MD Last Physical: 7/12/2019 Last MTM visit: Visit date not found Last visit same specialty: 12/3/2018 Marya Hartley MD.  Next visit within 3 mo: Visit date not found  Next physical within 3 mo: Visit date not found      Renee King, [facility] Triage/Med Refill 7/13/2020    Requested Prescriptions   Pending Prescriptions Disp Refills     estradioL (ESTRACE) 0.01 % (0.1 mg/gram) vaginal cream [Pharmacy Med Name: ESTRADIOL 0.01% CREAM] 42.5 g 11     Sig: INSERT 1 GM INTO THE VAGINA ONCE WEEKLY.       Hormone Replacement Therapy Refill Protocol Failed - 7/13/2020 11:07 AM        Failed - PCP or prescribing provider visit in past 12 months       Last office visit with prescriber/PCP: 12/3/2018 Marya Hartley MD OR same dept: Visit date not found OR same specialty: 12/3/2018 Marya Hartley MD  Last physical: 7/12/2019 Last MTM visit: Visit date not found     Next visit within 3 mo: Visit date not found  Next physical within 3 mo: Visit date not found  Prescriber OR PCP: Marya Hartley MD  Last diagnosis associated with med order: 1. Atrophic vaginitis  - estradioL (ESTRACE) 0.01 % (0.1 mg/gram) vaginal cream [Pharmacy Med Name: ESTRADIOL 0.01% CREAM]; INSERT 1 GM INTO THE VAGINA ONCE WEEKLY.  Dispense: 42.5 g; Refill: 1     If protocol passes may refill for 3 months.

## 2021-06-11 NOTE — PROGRESS NOTES
[facility] Daily Progress     Patient Name: Tosha Rosales  Date: 7/7/2017  Visit #: 2  PTA visit #:  -  Referral Diagnosis:   MEDX  Lumbar facet arthropathy [M12.88]  - Primary       Lumbago [M54.5]       Referring provider: Jewell Millan PA-C  Visit Diagnosis:     ICD-10-CM    1. Chronic bilateral low back pain without sciatica M54.5     G89.29    2. Facet arthropathy, lumbar M12.88    3. Weakness of back M62.81      Tosha Rosales is a 62 y.o. female who presents to therapy today with chief complaints of chronic low back pain which started in January 2016, with insidious onset, but possibly associated with increased activity in May 2016 when she moved houses and did a lot of lifting/bending/painting/yardwork. The patient has the most difficulty with bending, lifting, and gardening due to pain. With examination today, the patient demonstrates normal and mildly painful lumbar ROM, - SI joint testing, - LE neural tension testing, back weakness on MEDX, pain with PA mobilizations to L4,5, and relief with BLE distraction. Her pain is consistent with facet arthropathy. The patient will likely benefit from skilled PT to improve strength, pain, joint mobility, and overall function.     Assessment:     HEP/POC compliance is  good .     The patient is tolerating PT well. She reports benefits from the stretching and will benefit from continued work on strengthening and lumbar stretching/mobilizations to improve function and pain.    Goal Status:  Pt. will be independent with home exercise program in : 4 weeks  Pt. will report decreased intensity, frequency of : Pain;in 4 weeks;Comment  Comment:: 50%  Patient will decrease : LEONEL score;for improved quality of function;by _ points  by ___ points: 30%  Pt will: be able to lift and exercise more than she does now (pushups, yoga) without increased pain in 6 weeks:  Pt will: be able to do her gardening without increased back pain in 6 weeks:    Plan / Patient  Education:     Continue with initial plan of care.     Plan for next visit: DE on MEDX, Rotary Torso, Review TA exercise and progress, possible SL lumbar rotation mobilizations, Lumbar paraspinal and glut strengthening.    Subjective:     Pain Ratin     She had a little soreness with the testing, but more muscles than anything.    Objective:     Patient has completed the 1st week of the MEDX program.  Mild cues for TA set with SLR  Mild cues for pelvic positioning with quadruped leg extensions.    Exercises:  Exercise #1: SKTC, Hamstring Stretch with band, lumbar rotation stretch (sustained), piriformis stretch (figure 4), TA Set with marching, Cat/cow  Comment #1: TA SLR  Exercise #2: Jesus pose 3 way  Comment #2: X 30 seconds.  Exercise #3: Rotary Torso 90 seconds 20#'s  Comment #3: Started to the R  Exercise #4: Quadruped Leg Extension  Comment #4: X 10    Treatment Today     TREATMENT MINUTES COMMENTS   Evaluation     Self-care/ Home management     Manual therapy     Neuromuscular Re-education     Therapeutic Activity     Therapeutic Exercises 27 Review and progression of HEP, MEDX.   Gait training     Modality__________________                Total 27    Blank areas are intentional and mean the treatment did not include these items.       Ming BOLANOS  2017

## 2021-06-11 NOTE — PROGRESS NOTES
Office Visit - New Patient   Tosha Rosales   62 y.o.  female    Date of visit: 6/6/2017  Physician: Mariusz Kumari MD     Assessment and Plan   1. Routine general medical examination at a health care facility  This is a generally healthy 62-year-old woman.  Please see below for screening tests.  She is up-to-date on her Pap smear.  She will start with gynecology may need a Pap smear next year.  We calculated her 10 year risk of heart attack or death from heart attack and it is 2%.  I do not recommend a statin therapy at this point.  We did discuss healthy diet and regular exercise.  - HM2(CBC w/o Differential)  - Basic Metabolic Panel  - Thyroid Cascade    2. Other screening mammogram  - Mammo Screening Bilateral; Future    3. Screening for colon cancer  - Ambulatory referral for Colonoscopy    4. Screening for cervical cancer  - Ambulatory referral to Obstetrics / Gynecology    5. Screening for diabetes mellitus  - Glycosylated Hemoglobin A1c    6. Screening for hyperlipidemia  - Lipid Cascade    7. Chronic bilateral low back pain with sciatica, sciatica laterality unspecified  Given the duration of her symptoms of over one year I think she needs some imaging.  We will start with an MRI and have her see our spine clinic for further management.  - Ambulatory referral to Spine Care  - MR Lumbar Spine Without Contrast; Future    Return in about 1 year (around 6/6/2018) for annual physical.     Chief Complaint   Chief Complaint   Patient presents with     Annual Exam     fasting     Back Pain        Patient Profile   Social History     Social History Narrative    Lives with her , Srini.  Works for [employer] .  Two daughters (Beti) and Maegan ([employer]).          Past Medical History   Patient Active Problem List   Diagnosis     Atrophic vaginitis     Polyp of colon     Hyperlipidemia     Acne erythematosa       Past Surgical History  She has a past surgical history that includes  Diagnostic laparoscopy; Breast biopsy; and Dilation and curettage of uterus.     History of Present Illness   This 62 y.o. old woman comes in for annual physical and evaluation of low back pain.  She is generally quite healthy.  Back January 2016 she slipped and fell.  She went through a course of physical therapy and has been doing her exercises every morning.  Despite this she continues to have bilateral low back pain.  Rarely gets radicular symptoms but occasionally will have some radiation into the buttocks.  Pain is on both sides it is worse with sitting and standing but improves with walking.  She has no history of cancer no fever chills or sweats.  She has no saddle anesthesia or loss of bowel or bladder function.  She has no weakness or numbness in her legs.    Review of Systems: A comprehensive review of systems was negative except as noted.     Medications and Allergies   Current Outpatient Prescriptions   Medication Sig Dispense Refill     azelaic acid (FINACEA) 15 % gel Apply topically.       calcium-vitamin D3-vitamin K 500-100-40 mg-unit-mcg Chew Chew 1 tablet.       ESTRACE 0.01 % (0.1 mg/gram) vaginal cream INSERT 2 G INTO THE VAGINA ONCE WEEKLY. 42.5 g 5     multivitamin (ONE A DAY) per tablet Take 1 tablet by mouth.       No current facility-administered medications for this visit.      Allergies   Allergen Reactions     Codeine Unknown        Family and Social History   Family History   Problem Relation Age of Onset     Dementia Mother      Depression Mother      Dementia Father      Stroke Father      Brain cancer Sister      astrocytoma     No Medical Problems Brother      No Medical Problems Sister      No Medical Problems Brother      No Medical Problems Brother      No Medical Problems Daughter         Social History   Substance Use Topics     Smoking status: Never Smoker     Smokeless tobacco: None     Alcohol use 2.4 oz/week     4 Standard drinks or equivalent per week        Physical Exam  "  General Appearance:   No acute distress    /72 (Patient Site: Left Arm, Patient Position: Sitting, Cuff Size: Adult Regular)  Pulse 68  Ht 5' 7\" (1.702 m)  Wt 182 lb (82.6 kg)  SpO2 99%  BMI 28.51 kg/m2    EYES: Eyelids, conjunctiva, and sclera were normal. Pupils were normal. Cornea, iris, and lens were normal bilaterally.  HEAD, EARS, NOSE, MOUTH, AND THROAT: Head and face were normal. Hearing was normal to voice and the ears were normal to external exam. Nose appearance was normal and there was no discharge. Oropharynx was normal.  NECK: Neck appearance was normal. There were no neck masses and the thyroid was not enlarged.  RESPIRATORY: Breathing pattern was normal and the chest moved symmetrically.  Percussion/auscultatory percussion was normal.  Lung sounds were normal and there were no abnormal sounds.  CARDIOVASCULAR: Heart rate and rhythm were normal.  S1 and S2 were normal and there were no extra sounds or murmurs. Peripheral pulses in arms and legs were normal.  Jugular venous pressure was normal.  There was no peripheral edema.  GASTROINTESTINAL: The abdomen was normal in contour.  Bowel sounds were present.  Percussion detected no organ enlargement or tenderness.  Palpation detected no tenderness, mass, or enlarged organs.   MUSCULOSKELETAL: Skeletal configuration was normal and muscle mass was normal for age. Joint appearance was overall normal.  LYMPHATIC: There were no enlarged nodes.  SKIN/HAIR/NAILS: Skin color was normal.  There were no skin lesions.  Hair and nails were normal.  NEUROLOGIC: The patient was alert and oriented to person, place, time, and circumstance. Speech was normal. Cranial nerves were normal. Motor strength was normal for age. The patient was normally coordinated.  PSYCHIATRIC:  Mood and affect were normal and the patient had normal recent and remote memory. The patient's judgment and insight were normal.  CHEST WALL/BREASTS: Normal breast exam        Additional " Information        Mariusz Kumari MD  Internal Medicine  Contact me at [phone]

## 2021-06-11 NOTE — PROGRESS NOTES
[facility]   [facility]-Pelvic Initial Evaluation    Patient Name: Tosha Rosales  Date of evaluation: 7/3/2017  Referral Diagnosis: MEDX  Lumbar facet arthropathy [M12.88]  - Primary       Lumbago [M54.5]       Referring provider: Jewell Millan PA-C  Visit Diagnosis:     ICD-10-CM    1. Chronic bilateral low back pain without sciatica M54.5     G89.29    2. Facet arthropathy, lumbar M12.88    3. Weakness of back M62.81        Assessment:        Tosha Rosales is a 62 y.o. female who presents to therapy today with chief complaints of chronic low back pain which started in January 2016, with insidious onset, but possibly associated with increased activity in May 2016 when she moved houses and did a lot of lifting/bending/painting/yardwork. The patient has the most difficulty with bending, lifting, and gardening due to pain. With examination today, the patient demonstrates normal and mildly painful lumbar ROM, - SI joint testing, - LE neural tension testing, back weakness on MEDX, pain with PA mobilizations to L4,5, and relief with BLE distraction. Her pain is consistent with facet arthropathy. The patient will likely benefit from skilled PT to improve strength, pain, joint mobility, and overall function.     Goals:  Pt. will be independent with home exercise program in : 4 weeks  Pt. will report decreased intensity, frequency of : Pain;in 4 weeks;Comment  Comment:: 50%  Patient will decrease : LEONEL score;for improved quality of function;by _ points  by ___ points: 30%  Pt will: be able to lift and exercise more than she does now (pushups, yoga) without increased pain in 6 weeks:  Pt will: be able to do her gardening without increased back pain in 6 weeks:    Patient's expectations/goals are realistic.    Barriers to Learning or Achieving Goals:  Chronicity of the problem.       Plan / Patient Instructions:        Plan of Care:   Communication with: Referral Source  Patient Related Instruction: Nature of  Condition;Treatment plan and rationale;Self Care instruction;Basis of treatment;Body mechanics;Posture;Precautions;Next steps;Expected outcome  Times per Week: 1-2  Number of Weeks: 12  Number of Visits: 16  Discharge Planning: pt will meet all PT goals or reach a plateau with PT  Precautions / Restrictions : none  Therapeutic Exercise: ROM;Stretching;Strengthening  Neuromuscular Reeducation: kinesio tape;posture;TNE;core  Manual Therapy: soft tissue mobilization;myofascial release;joint mobilization;muscle energy  Modalities: traction;TENS;ultrasound;hot pack;cold pack;other  Modalities: LIMITED PRN    Plan for next visit: DE on MEDX, Rotary Torso, Review TA exercise and progress, possible SL lumbar rotation strengthening, Lumbar paraspinal and glut strengthening.     Subjective:         Social information:   Occupation: for [employer].    Work Status:Working part time    History of Present Illness:      Pain started in January of 2016, she might have slipped while walking her dog, moved in May of 2016 and a lot of stress at the time with father being sick, painting, moving, taking care of a new yard. In the fall she went to PT and has been doing these exercises since December and her back still hurts. Pain does vary based on what she does. Takes ibuprofen as a cautionary when gardening or active.   Pain described as in the low back on both sides at the base (alternates sides), occasional thoracic/lower lumbar with bending over, occasionally some tingling in the back of the legs (very rare). Aching pains.  Worse with gardening, carrying groceries, getting in and out of bed, lifting, bending, yard/housework, prolonged sitting (1.5 hours). Worse in the AM.  Better with ibuprofen, activity in a controlled way (stretching, walking)  Previous treatment PT.  History of no other significant history.  Does yoga, exercise otherwise 1X/week, she walks every day with dog, 2X/week on her own more  vigorous.      Pain Ratin  Pain rating at best: 0  Pain rating at worst: 6  Pain description: aching and pain    Functional limitations are described as occurring with:   Changing sleeping positions, bending, yard/housework, lifting, bending, prolonged sitting.         Objective:      Note: Items left blank indicates the item was not performed or not indicated at the time of the evaluation.    Patient Outcome Measures :    Modified Oswestry Low Back Pain Disablity Questionnaire  in %: 16   Scores range from 0-100%, where a score of 0% represents minimal pain and maximal function. The minimal clinically important difference is a score reduction of 12%.    Examination  1. Chronic bilateral low back pain without sciatica     2. Facet arthropathy, lumbar     3. Weakness of back       Precautions/Restrictions: None  Involved side: Bilateral  Posture Observation:      General sitting posture is  normal.  General standing posture is normal.    Lumbar ROM:    Date: 7/3/17     *Indicate scale AROM AROM AROM   Lumbar Flexion To toes slight pain at bottom and top     Lumbar Extension Slight pain on R WNL      Right Left Right Left Right Left   Lumbar Sidebending WNL WNL       Lumbar Rotation WNL WNL       Thoracic Rotation           Lower Extremity Strength:     Date: 7/3/17     LE strength/5 Right Left Right Left Right Left   Hip Flexion (L1-3) 5 5       Hip Extension (L5-S1) 5 5       Hip Abduction (L4-5) 5 5       Hip Adduction (L2-3)         Hip External Rotation         Hip Internal Rotation         Knee Extension (L3-4) 5 5       Knee Flexion 5 5       Ankle Dorsiflexion (L4-5) 5 5       Great Toe Extension (L5) 5 5       Ankle Plantar flexion (S1) 5 5       Abdominals        Sensation    WNL to light touch sensation screen    Reflex Testing  Lumbar Dermatomes Right Left UE Reflexes Right Left   Iliac Crest and Groin (L1)   Biceps (C5-6)     Anterior Medial Thigh (L2)   Brachioradialis (C5-6)     Anterior Thigh,  Medial Epicondyle Femur (L3)   Triceps (C7-8)     Lateral Thigh, Anterior Knee, Medial Leg/Malleolus (L4)   Katey s test     Lateral Leg, Dorsal Foot (L5)   LE Reflexes     Lateral Foot (S1)   Patellar (L3-4)     Posterior Leg (S2)   Achilles (S1-2)     Other:   Babinski Response       Palpation:     Lumbar Special Tests:     Lumbar Special Tests Right Left SI Tests Right  Left   Quadrant test   SI Compression     Straight leg raise 80 80 SI Distraction     Crossover response   POSH Test - -   Slump - - Sacral Thrust - -   Sit-up test  FADIR - -   Trunk extensor endurance test  Resisted Abduction - -   Prone instability test  DANAY - -   Pubic shotgun  Other: BLE Distraction relief relief     Repeated Motion Testing:  Does not centralize  Does not peripheralize    Passive Mobility - Joint Integrity:  WNL  Some tenderness to PA at L4 and L5    LE Screen:  WNL     Imaging Results from chart review:  RESULTS: MRI of the lumbar spine [facility] dated June 13, 2017 was reviewed.  At L4-5 there is moderate to severe facet arthropathy with associated edema and low-grade anterolisthesis of L4.  There is mild to moderate degeneration with shallow annular bulging without stenosis.  At L5-S1 there is mild disc and mild facet degeneration without stenosis.  There is minor disc degeneration T12-L1 through L3-4 without herniation or stenosis and mild facet arthropathy at L3-4.  There is also a 6.6 cm exophytic cyst arising from the lower pole left kidney.  Please see report for further details.    Treatment Today     TREATMENT MINUTES COMMENTS   Evaluation 25 Low complexity   Self-care/ Home management     Manual therapy     Neuromuscular Re-education     Therapeutic Activity     Therapeutic Exercises 25 Education on facet arthropathy, benefits of exercise on arthritis, purpose of MEDX, review and addition to current HEP   Gait training     Modality__________________                Total 50    Blank areas are  intentional and mean the treatment did not include these items.     PT Evaluation Code: (Please list factors)  Patient History/Comorbidities: none  Examination: see objective  Clinical Presentation: stable  Clinical Decision Making: low    Patient History/  Comorbidities Examination  (body structures and functions, activity limitations, and/or participation restrictions) Clinical Presentation Clinical Decision Making (Complexity)   No documented Comorbidities or personal factors 1-2 Elements Stable and/or uncomplicated Low   1-2 documented comorbidities or personal factor 3 Elements Evolving clinical presentation with changing characteristics Moderate   3-4 documented comorbidities or personal factors 4 or more Unstable and unpredictable High                Ming BOLANOS  7/3/2017  2:18 PM

## 2021-06-11 NOTE — PROGRESS NOTES
[facility] Daily Progress     Patient Name: Tosha Rosales  Date: 7/19/2017  Visit #: 6   PTA visit #:  -  Referral Diagnosis:   MEDX  Lumbar facet arthropathy [M12.88]  - Primary       Lumbago [M54.5]       Referring provider: Jewell Millan PA-C  Visit Diagnosis:     ICD-10-CM    1. Chronic bilateral low back pain without sciatica M54.5     G89.29    2. Facet arthropathy, lumbar M12.88    3. Weakness of back M62.81      Tosha Rosales is a 62 y.o. female who presents to therapy today with chief complaints of chronic low back pain which started in January 2016, with insidious onset, but possibly associated with increased activity in May 2016 when she moved houses and did a lot of lifting/bending/painting/yardwork. The patient has the most difficulty with bending, lifting, and gardening due to pain. With examination today, the patient demonstrates normal and mildly painful lumbar ROM, - SI joint testing, - LE neural tension testing, back weakness on MEDX, pain with PA mobilizations to L4,5, and relief with BLE distraction. Her pain is consistent with facet arthropathy. The patient will likely benefit from skilled PT to improve strength, pain, joint mobility, and overall function.     Assessment:     HEP/POC compliance is  good .     The patient is tolerating PT well. She reports benefits from the stretching and will benefit from continued work on strengthening and lumbar stretching/mobilizations to improve function and pain.    Goal Status:  Pt. will be independent with home exercise program in : 4 weeks  Pt. will report decreased intensity, frequency of : Pain;in 4 weeks;Comment  Comment:: 50%  Patient will decrease : LEONEL score;for improved quality of function;by _ points  by ___ points: 30%  Pt will: be able to lift and exercise more than she does now (pushups, yoga) without increased pain in 6 weeks:  Pt will: be able to do her gardening without increased back pain in 6 weeks:    Plan / Patient  Education:     Continue with initial plan of care.     Plan for next visit: DE on MEDX, Rotary Torso, Review TA exercise and progress, possible SL lumbar rotation mobilizations, Lumbar paraspinal and glut strengthening.    Subjective:     Pain Ratin/10     Back is doing pretty well, some soreness today as she worked on laying her edging bricks a lot yesterday.    Objective:     Patient has completed the 3rd week of the MEDX program.  Progressed physioball exercises.    Exercises:  Exercise #1: SKTC, Hamstring Stretch with band, lumbar rotation stretch (sustained), piriformis stretch (figure 4), TA Set with marching, Cat/cow  Comment #1: TA SLR  Exercise #2: Jesus pose 3 way  Comment #2: X 30 seconds.  Exercise #3: Rotary Torso 90 seconds 24#'s  Comment #3: Started to the L  Exercise #4: Quadruped Leg Extension  Comment #4: X 10  Exercise #5: TA Deadbug March  Comment #5: X 15  Exercise #6: Kneeling hip flexor stretch  Comment #6: X 30 seconds.  Exercise #7: Bridge on Ball with and without curls  Comment #7: X 10   Exercise #8: Bridge and SL bridge  Comment #8: X 10   Exercise #9: Prone Leg extension  Comment #9: X 10   Exercise #10: Physioball Ab rollout  Comment #10: X 10   Exercise #11: Pilates reformer 90/90 pulldowns, tall kneeling extensions, Abductions  Comment #11: X 10 2 red, X 10 2 red 1 blue, X 10 2 red  Exercise #12: Physioball Back Extensions  Comment #12: X 10     Treatment Today     TREATMENT MINUTES COMMENTS   Evaluation     Self-care/ Home management     Manual therapy     Neuromuscular Re-education     Therapeutic Activity     Therapeutic Exercises 28 Review and progression of HEP, MEDX.   Gait training     Modality__________________                Total 28    Blank areas are intentional and mean the treatment did not include these items.       Ming BOLANOS  2017

## 2021-06-11 NOTE — PROGRESS NOTES
ASSESSMENT: Tosha Rosales is a 62 y.o. female with past medical history significant for hyperlipidemia who presents today for new patient evaluation of chronic bilateral low back pain with occasional tingling in left posterior thigh.  MRI of lumbar spine shows moderate to severe facet arthropathy at L4-5 with associated edema which is the source of her pain.  She may also have mild sacroiliac joint dysfunction.    LEONEL: 12  WHO 5: 20    PLAN:  A shared decision making model was used.  The patient's values and choices were respected.  The following represents what was discussed and decided upon by the physician assistant and the patient.      1.  DIAGNOSTIC TESTS: I reviewed the MRI lumbar spine with the patient with the help of spine model.  No further diagnostic tests were ordered.    2.  PHYSICAL THERAPY:  Discussed the importance of core strengthening, ROM, stretching exercises with the patient and how each of these entities is important in decreasing pain.  Explained to the patient that the purpose of physical therapy is to teach the patient a home exercise program.  These exercises need to be performed every day in order to decrease pain and prevent future occurrences of pain.  I placed an order for the patient to begin the [facility] physical therapy program.  The patient had previously had traditional physical therapy, but her home exercise program is not working well for her.  Some of the exercises are actually making her pain worse.    3.  MEDICATIONS: No changes are made to the patient's medications.  She can continue using ibuprofen as needed.  I did offer a prescription anti-inflammatory medication, but she declined.    4.  INTERVENTIONS: No interventions were ordered.  If patient fails to improve with conservative treatment, we could consider a bilateral L4-5 facet joint injection.    5.  PATIENT EDUCATION: The patient I discussed trying a low inflammatory diet to help with her pain.    -The patient is in  agreement with the above plan.  All questions were answered.    6.  FOLLOW-UP:   I will see the patient back in clinic in 6 weeks to follow-up.  If she has any questions or concerns in the meantime, she should not hesitate to contact our clinic.      SUBJECTIVE:  Tosha Rosales  Is a 62 y.o. female who presents today in consultation at the request of Dr. Kumari for new patient evaluation of low back pain.  The patient states that she began have pain in January 2016.  At that time she was participating in an exercise program and she also had a slip and fall on the ice.  The patient states that she continued to have pain which became more severe in the fall 2016.  She attended physical therapy in December 2016 and has continued to do her home exercises.  Unfortunately, her pain has been progressing and some the exercises in her home exercise program is actually making her pain worse.  She was seen by her primary care provider who obtained an MRI of the lumbar spine and referred her to our clinic for further evaluation and treatment.    The patient complains of bilateral lower back pain.  The pain spans across low back and a broadband distribution at the belt line.  It also extends up into the lower thoracic area.  She denies any pain radiating into the buttocks or down the legs.  She states that very rarely if she experiences a tingling cessation in the left posterior thigh if she is very active.  Her pain is aggravated with lifting, gardening, rolling over in bed, getting out of bed in the morning.  It is alleviated with applying ice, repositioning and walking.  She denies any weakness in the legs.  She denies any loss of bowel or bladder control.    As mentioned above, the patient went to physical therapy last year.  She does not go to chiropractor.  She has never had any spine injections or spine surgery.  The patient uses ibuprofen as needed for pain.      Current Outpatient Prescriptions on File Prior to Encounter    Medication Sig Dispense Refill     azelaic acid (FINACEA) 15 % gel Apply topically.       calcium-vitamin D3-vitamin K 500-100-40 mg-unit-mcg Chew Chew 1 tablet.       ESTRACE 0.01 % (0.1 mg/gram) vaginal cream INSERT 2 G INTO THE VAGINA ONCE WEEKLY. 42.5 g 5     multivitamin (ONE A DAY) per tablet Take 1 tablet by mouth.         Allergies   Allergen Reactions     Codeine Unknown     Patient Active Problem List   Diagnosis     Atrophic vaginitis     Polyp of colon     Hyperlipidemia     Acne erythematosa         Past Surgical History:   Procedure Laterality Date     BREAST BIOPSY Left 2004     DIAGNOSTIC LAPAROSCOPY      infertility     DILATION AND CURETTAGE OF UTERUS         Family History   Problem Relation Age of Onset     Dementia Mother      Depression Mother      Dementia Father      Stroke Father      Brain cancer Sister      astrocytoma     No Medical Problems Brother      No Medical Problems Sister      No Medical Problems Brother      No Medical Problems Brother      No Medical Problems Daughter      Breast cancer Maternal Grandmother 85       Social History     Social History     Marital status:      Spouse name: N/A     Number of children: N/A     Years of education: N/A     Social History Main Topics     Smoking status: Never Smoker     Smokeless tobacco: None     Alcohol use 2.4 oz/week     4 Standard drinks or equivalent per week     Drug use: No     Sexual activity: Yes     Partners: Male     Other Topics Concern     None     Social History Narrative    Lives with her , Srini.  Works for [employer] .  Two daughters (Beti) and Maegan ([employer] [facility] [employer] [facility]).           ROS: Positive for swelling of the ankles, back pain, depression/worry.  Specifically negative for bowel/bladder dysfunction, fevers,chills, appetite changes, unexplained weight loss.   Otherwise 13 systems reviewed are negative.  Please see the patient's intake questionnaire from today for  details.      OBJECTIVE:  PHYSICAL EXAMINATION:    CONSTITUTIONAL:  Vital signs as above.  No acute distress.  The patient is well nourished and well groomed.  PSYCHIATRIC:  The patient is awake, alert, oriented to person, place, time and answering questions appropriately with clear speech.    HEENT: Normocephalic, atraumatic.  Sclera clear.  Neck is supple.  SKIN:  Skin over the face, bilateral lower extremities, and posterior torso is clean, dry, intact without rashes.    GAIT:  Gait is non-antalgic.  The patient is able to heel and toe walk without significant difficulty.    STANDING EXAMINATION: Mild tenderness palpation of the bilateral lower lumbar paraspinous muscles and the bilateral sacroiliac joints.  Flexion is intact.  Lumbar extension is mildly restricted.  Facet loading maneuvers reproduce low back pain bilaterally.  MUSCLE STRENGTH:  The patient has 5/5 strength for the bilateral hip flexors, knee flexors/extensors, ankle dorsiflexors/plantar flexors, great toe extensors, ankle evertors/invertors.  NEUROLOGICAL:  2/4 patellar, and achilles reflexes bilaterally.  Negative Babinski's bilaterally.  No ankle clonus bilaterally. Sensation to light touch is intact in the bilateral L4, L5, and S1 dermatomes.  VASCULAR:  2/4 dorsalis pedis pulses bilaterally.  Bilateral lower extremities are warm.  There is no pitting edema of the bilateral lower extremities.  ABDOMINAL:  Soft, non-distended, non-tender throughout all quadrants.  No pulsatile mass palpated in the left lower quadrant.  LYMPH NODES:  No palpable or tender inguinal lymph nodes.  MUSCULOSKELETAL: Straight leg raise is negative bilaterally.  Rony's test is negative bilaterally.    RESULTS: MRI of the lumbar spine from [facility] dated June 13, 2017 was reviewed.  At L4-5 there is moderate to severe facet arthropathy with associated edema and low-grade anterolisthesis of L4.  There is mild to moderate degeneration with shallow annular bulging  without stenosis.  At L5-S1 there is mild disc and mild facet degeneration without stenosis.  There is minor disc degeneration T12-L1 through L3-4 without herniation or stenosis and mild facet arthropathy at L3-4.  There is also a 6.6 cm exophytic cyst arising from the lower pole left kidney.  Please see report for further details.

## 2021-06-11 NOTE — PROGRESS NOTES
[facility] Daily Progress     Patient Name: Tosha Rosales  Date: 7/14/2017  Visit #: 4   PTA visit #:  -  Referral Diagnosis:   MEDX  Lumbar facet arthropathy [M12.88]  - Primary       Lumbago [M54.5]       Referring provider: Jewell Millan PA-C  Visit Diagnosis:     ICD-10-CM    1. Chronic bilateral low back pain without sciatica M54.5     G89.29    2. Facet arthropathy, lumbar M12.88    3. Weakness of back M62.81      Tosha Rosales is a 62 y.o. female who presents to therapy today with chief complaints of chronic low back pain which started in January 2016, with insidious onset, but possibly associated with increased activity in May 2016 when she moved houses and did a lot of lifting/bending/painting/yardwork. The patient has the most difficulty with bending, lifting, and gardening due to pain. With examination today, the patient demonstrates normal and mildly painful lumbar ROM, - SI joint testing, - LE neural tension testing, back weakness on MEDX, pain with PA mobilizations to L4,5, and relief with BLE distraction. Her pain is consistent with facet arthropathy. The patient will likely benefit from skilled PT to improve strength, pain, joint mobility, and overall function.     Assessment:     HEP/POC compliance is  good .     The patient is tolerating PT well. She reports benefits from the stretching and will benefit from continued work on strengthening and lumbar stretching/mobilizations to improve function and pain.    Goal Status:  Pt. will be independent with home exercise program in : 4 weeks  Pt. will report decreased intensity, frequency of : Pain;in 4 weeks;Comment  Comment:: 50%  Patient will decrease : LEONEL score;for improved quality of function;by _ points  by ___ points: 30%  Pt will: be able to lift and exercise more than she does now (pushups, yoga) without increased pain in 6 weeks:  Pt will: be able to do her gardening without increased back pain in 6 weeks:    Plan / Patient  Education:     Continue with initial plan of care.     Plan for next visit: DE on MEDX, Rotary Torso, Review TA exercise and progress, possible SL lumbar rotation mobilizations, Lumbar paraspinal and glut strengthening.    Subjective:     Pain Ratin/10    Really likes her hip stretch. Things are going well. She hasn't had as much pain I the middle of the back as in the past.     Objective:     Patient has completed the 2nd week of the MEDX program.  Review of HEP  Discussed and practiced use of physioball with bridges.  Trial of SL bridge- difficulty maintaining neutral pelvis.    Exercises:  Exercise #1: SKTC, Hamstring Stretch with band, lumbar rotation stretch (sustained), piriformis stretch (figure 4), TA Set with marching, Cat/cow  Comment #1: TA SLR  Exercise #2: Jesus pose 3 way  Comment #2: X 30 seconds.  Exercise #3: Rotary Torso 90 seconds 22#'s  Comment #3: Started to the R  Exercise #4: Quadruped Leg Extension  Comment #4: X 10  Exercise #5: TA Deadbug March  Comment #5: X 15  Exercise #6: Kneeling hip flexor stretch  Comment #6: X 30 seconds.  Exercise #7: Bridge on Ball with and without curls  Comment #7: X 10   Exercise #8: Bridge and SL bridge  Comment #8: X 10     Treatment Today     TREATMENT MINUTES COMMENTS   Evaluation     Self-care/ Home management     Manual therapy     Neuromuscular Re-education     Therapeutic Activity     Therapeutic Exercises 27 Review and progression of HEP, MEDX.   Gait training     Modality__________________                Total 27    Blank areas are intentional and mean the treatment did not include these items.       Ming BOLANOS  2017

## 2021-06-11 NOTE — PROGRESS NOTES
[facility] Daily Progress     Patient Name: Tosha Rosales  Date: 7/10/2017  Visit #: 3  PTA visit #:  -  Referral Diagnosis:   MEDX  Lumbar facet arthropathy [M12.88]  - Primary       Lumbago [M54.5]       Referring provider: Jewell Millan PA-C  Visit Diagnosis:     ICD-10-CM    1. Chronic bilateral low back pain without sciatica M54.5     G89.29    2. Facet arthropathy, lumbar M12.88    3. Weakness of back M62.81      Tosha Rosales is a 62 y.o. female who presents to therapy today with chief complaints of chronic low back pain which started in January 2016, with insidious onset, but possibly associated with increased activity in May 2016 when she moved houses and did a lot of lifting/bending/painting/yardwork. The patient has the most difficulty with bending, lifting, and gardening due to pain. With examination today, the patient demonstrates normal and mildly painful lumbar ROM, - SI joint testing, - LE neural tension testing, back weakness on MEDX, pain with PA mobilizations to L4,5, and relief with BLE distraction. Her pain is consistent with facet arthropathy. The patient will likely benefit from skilled PT to improve strength, pain, joint mobility, and overall function.     Assessment:     HEP/POC compliance is  good .     The patient is tolerating PT well. She reports benefits from the stretching and will benefit from continued work on strengthening and lumbar stretching/mobilizations to improve function and pain.    Goal Status:  Pt. will be independent with home exercise program in : 4 weeks  Pt. will report decreased intensity, frequency of : Pain;in 4 weeks;Comment  Comment:: 50%  Patient will decrease : LEONEL score;for improved quality of function;by _ points  by ___ points: 30%  Pt will: be able to lift and exercise more than she does now (pushups, yoga) without increased pain in 6 weeks:  Pt will: be able to do her gardening without increased back pain in 6 weeks:    Plan / Patient  Education:     Continue with initial plan of care.     Plan for next visit: DE on MEDX, Rotary Torso, Review TA exercise and progress, possible SL lumbar rotation mobilizations, Lumbar paraspinal and glut strengthening.    Subjective:     Pain Ratin     Things are going well. Quadruped exercises is going well. No pain currently, did have a little bit this AM.    Objective:     Patient is in the 2nd week of the MEDX program.  Progression of TA Strengthening.  Mild cues for pelvic positioning with quadruped leg extensions.    Exercises:  Exercise #1: SKTC, Hamstring Stretch with band, lumbar rotation stretch (sustained), piriformis stretch (figure 4), TA Set with marching, Cat/cow  Comment #1: TA SLR  Exercise #2: Jesus pose 3 way  Comment #2: X 30 seconds.  Exercise #3: Rotary Torso 90 seconds 20#'s  Comment #3: Started to the L  Exercise #4: Quadruped Leg Extension  Comment #4: X 10  Exercise #5: TA Deadbug March  Comment #5: X 15  Exercise #6: Kneeling hip flexor stretch  Comment #6: X 30 seconds.    Treatment Today     TREATMENT MINUTES COMMENTS   Evaluation     Self-care/ Home management     Manual therapy     Neuromuscular Re-education     Therapeutic Activity     Therapeutic Exercises 27 Review and progression of HEP, MEDX.   Gait training     Modality__________________                Total 27    Blank areas are intentional and mean the treatment did not include these items.       Ming BOLANOS  7/10/2017

## 2021-06-12 NOTE — PROGRESS NOTES
Assessment:   Tosha Rosales is a 62 y.o. y.o. female with past medical history significant for hyperlipidemia who presents today for follow-up regarding chronic bilateral low back pain.  MRI of lumbar spine shows moderate to severe facet arthropathy at L4-5 with associated edema which is the source of her pain.  She has had significant improvement in her back pain and resolution of her left thigh tingling since completing the [facility] physical therapy program.  She is neurologically intact.       Plan:     A shared decision making plan was used.  The patient's values and choices were respected.  The following represents what was discussed and decided upon by the physician assistant and the patient.      1.  DIAGNOSTIC TESTS: I reviewed the MRI lumbar spine.  No further diagnostic tests were ordered.    2.  PHYSICAL THERAPY: No further physical therapy is ordered.  The patient recently completed the [facility] physical therapy program.  She is discharged with her home exercise program.  I encouraged her to do her exercises on a daily basis.    3.  MEDICATIONS: No changes are made to the patient's medications.  She uses ibuprofen as needed.    4.  INTERVENTIONS: No interventions were ordered.  The patient's pain were to return and not respond to conservative treatment, I recommend a bilateral L4-5 facet joint injection.    5.  PATIENT EDUCATION: I provided a DVD with information about the anti-inflammatory diet.  -The patient is in agreement with the above plan.  All questions were answered.    6.  FOLLOW-UP: The patient to follow-up with me as needed.  If she has any questions or concerns, she should not hesitate to call.    Subjective:     Tosha Rosales is a 62 y.o. female who presents today for follow-up regarding chronic bilateral low back pain.  I saw the patient in consultation on June 16, 2017.  At that time I referred the patient to the [facility] physical therapy program.  The patient completed 6 sessions and was  discharged home with her home exercise program.  She has had significant reduction in her low back pain and resolution of her thigh paresthesias with physical therapy.    The patient states that she has only mild residual right greater than left low back pain.  She denies any pain radiating down the leg.  She denies any numbness, tingling, or weakness.  She rates her pain today is a 0-10.  At its best it is a 0-10.  At its worst it is a 3-4 out of 10.  The patient's pain is aggravated with excessive lifting or exertion.  Her pain is alleviated with stretching and rest.  She denies any new symptoms and she was last seen.    As mentioned above, the patient completed the medics program for her low back.  She is doing her home exercises.  Uses ibuprofen sparingly for pain.    Past medical history is reviewed and is unchanged in the interim.    Family history is reviewed and is unchanged in the interim.      Review of Systems:  Negative for numbness/tingling, loss of bowel/bladder, footdrop, weakness, headache, dizziness, nausea/vomiting, blurry vision, balance changes.     Objective:   CONSTITUTIONAL:  Vital signs as above.  No acute distress.  The patient is well nourished and well groomed.    PSYCHIATRIC:  The patient is awake, alert, oriented to person, place and time.  The patient is answering questions appropriately with clear speech.  Normal affect.  HEENT: Normocephalic, atraumatic.  Sclera clear.    SKIN:  Skin over the face, posterior torso, bilateral upper and lower extremities is clean, dry, intact without rashes.  MUSCULOSKELETAL:  Gait is non-antalgic.   The patient has 5/5 strength for the bilateral hip flexors, knee flexors/extensors, ankle dorsiflexors/plantar flexors, ankle evertors/invertors.    NEUROLOGICAL:    Sensation to light touch is intact in the bilateral L4, L5, and S1 dermatomes.       RESULTS:  MRI of the lumbar spine from [facility] dated June 13, 2017 was reviewed.  At L4-5 there is  moderate to severe facet arthropathy with associated edema and low-grade anterolisthesis of L4.  There is mild to moderate degeneration with shallow annular bulging without stenosis.  At L5-S1 there is mild disc and mild facet degeneration without stenosis.  There is minor disc degeneration T12-L1 through L3-4 without herniation or stenosis and mild facet arthropathy at L3-4.  There is also a 6.6 cm exophytic cyst arising from the lower pole left kidney.  Please see report for further details.

## 2021-06-12 NOTE — PROGRESS NOTES
[facility] Daily Progress     Patient Name: Tosha Rosales  Date: 7/24/2017  Visit #: 7  PTA visit #:  -  Referral Diagnosis:   MEDX  Lumbar facet arthropathy [M12.88]  - Primary       Lumbago [M54.5]       Referring provider: Jewell Millan PA-C  Visit Diagnosis:     ICD-10-CM    1. Chronic bilateral low back pain without sciatica M54.5     G89.29    2. Facet arthropathy, lumbar M12.88    3. Weakness of back M62.81      Tosha Rosales is a 62 y.o. female who presents to therapy today with chief complaints of chronic low back pain which started in January 2016, with insidious onset, but possibly associated with increased activity in May 2016 when she moved houses and did a lot of lifting/bending/painting/yardwork. The patient has the most difficulty with bending, lifting, and gardening due to pain. With examination today, the patient demonstrates normal and mildly painful lumbar ROM, - SI joint testing, - LE neural tension testing, back weakness on MEDX, pain with PA mobilizations to L4,5, and relief with BLE distraction. Her pain is consistent with facet arthropathy. The patient will likely benefit from skilled PT to improve strength, pain, joint mobility, and overall function.     Assessment:     HEP/POC compliance is  good .     The patient is tolerating PT well. She reports benefits from the stretching and will benefit from continued work on strengthening and lumbar stretching/mobilizations to improve function and pain. She will be ready for re-test next session. Showing improved ability to work in her garden.     Goal Status:  Pt. will be independent with home exercise program in : 4 weeks  Pt. will report decreased intensity, frequency of : Pain;in 4 weeks;Comment  Comment:: 50%  Patient will decrease : LEONEL score;for improved quality of function;by _ points  by ___ points: 30%  Pt will: be able to lift and exercise more than she does now (pushups, yoga) without increased pain in 6 weeks:   Pt  will: be able to do her gardening without increased back pain in 6 weeks:    Plan / Patient Education:     Continue with initial plan of care.     Plan for next visit: DE on MEDX, Rotary Torso, Review TA exercise and progress, possible SL lumbar rotation mobilizations, Lumbar paraspinal and glut strengthening. Band for lateral band walks, 4 week medx testing.    Subjective:     Pain Ratin/10    Has a question about one of the physioball exercises. A little tight in the morning, but no pain right now.    Objective:     Patient is in the 4th week of the MEDX program.  Progressed physioball exercises.    Exercises:  Exercise #1: SKTC, Hamstring Stretch with band, lumbar rotation stretch (sustained), piriformis stretch (figure 4), TA Set with marching, Cat/cow  Comment #1: TA SLR  Exercise #2: Jesus pose 3 way  Comment #2: X 30 seconds.  Exercise #3: Rotary Torso 90 seconds 26#'s  Comment #3: Started to the L  Exercise #4: Quadruped Leg Extension  Comment #4: X 10  Exercise #5: TA Deadbug March  Comment #5: X 15  Exercise #6: Kneeling hip flexor stretch  Comment #6: X 30 seconds.  Exercise #7: Bridge on Ball with and without curls  Comment #7: X 10   Exercise #8: Bridge and SL bridge  Comment #8: X 10   Exercise #9: Prone Leg extension  Comment #9: X 10   Exercise #10: Physioball Ab rollout  Comment #10: X 10   Exercise #11: Pilates reformer 90/90 pulldowns, tall kneeling extensions, Abductions, Leg Press  Comment #11: X 10 2 red, X 10 2 red 1 blue, X 10 2 red, X 15 all bands DL and SL  Exercise #12: Physioball Back Extensions  Comment #12: X 10   Exercise #13: Lateral Band walks  Comment #13: X 10 green     Treatment Today     TREATMENT MINUTES COMMENTS   Evaluation     Self-care/ Home management     Manual therapy     Neuromuscular Re-education     Therapeutic Activity     Therapeutic Exercises 28 Review and progression of HEP, MEDX.   Gait training     Modality__________________                Total 28    Blank  areas are intentional and mean the treatment did not include these items.       Ming BOLANOS  7/24/2017

## 2021-06-12 NOTE — PROGRESS NOTES
[facility] Daily Progress     Patient Name: Tosha Rosales  Date: 7/26/2017  Visit #: 8  PTA visit #:  -  Referral Diagnosis:   MEDX  Lumbar facet arthropathy [M12.88]  - Primary       Lumbago [M54.5]       Referring provider: Jewell Millan PA-C  Visit Diagnosis:     ICD-10-CM    1. Chronic bilateral low back pain without sciatica M54.5     G89.29    2. Facet arthropathy, lumbar M12.88    3. Weakness of back M62.81      Tosha Rosales is a 62 y.o. female who presents to therapy today with chief complaints of chronic low back pain which started in January 2016, with insidious onset, but possibly associated with increased activity in May 2016 when she moved houses and did a lot of lifting/bending/painting/yardwork. The patient has the most difficulty with bending, lifting, and gardening due to pain. With examination today, the patient demonstrates normal and mildly painful lumbar ROM, - SI joint testing, - LE neural tension testing, back weakness on MEDX, pain with PA mobilizations to L4,5, and relief with BLE distraction. Her pain is consistent with facet arthropathy. The patient will likely benefit from skilled PT to improve strength, pain, joint mobility, and overall function.     Assessment:     HEP/POC compliance is  good .     The patient is tolerating PT well. She was re-tested on MEDX and shows good improvements in strength, though she is still slightly below average for her demographic. The patient is no longer having much in terms of pain complaints, more stiffness, which responds well to stretching. The patient has met her goals and is independent with her HEP. She is appropriate for self management at this time.      Goal Status:  Pt. will be independent with home exercise program in : 4 weeks Met  Pt. will report decreased intensity, frequency of : Pain;in 4 weeks;Comment  Comment:: 50% Met  Patient will decrease : LEONEL score;for improved quality of function;by _ points  by ___ points: 30%  Met  Pt will: be able to lift and exercise more than she does now (pushups, yoga) without increased pain in 6 weeks: stronger, but has not tried yoga yet (will try on Friday)  Pt will: be able to do her gardening without increased back pain in 6 weeks: doing it all, still tougher with the longer days.     Plan / Patient Education:     Continue with initial plan of care.     Plan for next visit: Patient to trial independent management and return as needed to PT. D/C in 4 weeks if no return.    Subjective:     Pain Ratin/10    She still wakes up and is stiff in the AM, once she gets up and gets moving it goes away after an hour. Feels at least 50% improved overall.    Objective:     MED X Testing Lumbar Initial 17 4 week re-test 17   AROM (full ROM 0-72) 0-48 0-48   Max Torque  143#'s 175#'s   Avg Torque  93#'s 139#'s   Flex/ext Ratio (ideal 1.4:1) 3.04:1 1.94:1         Lumbar ROM:    Date: 7/3/17 7/26/17    *Indicate scale AROM AROM AROM   Lumbar Flexion To toes slight pain at bottom and top To toes no pain    Lumbar Extension Slight pain on R WNL WNL     Right Left Right Left Right Left   Lumbar Sidebending WNL WNL       Lumbar Rotation WNL WNL       Thoracic Rotation             Exercises:  Exercise #1: SKTC, Hamstring Stretch with band, lumbar rotation stretch (sustained), piriformis stretch (figure 4), TA Set with marching, Cat/cow  Comment #1: TA SLR  Exercise #2: Jesus pose 3 way  Comment #2: X 30 seconds.  Exercise #3: Rotary Torso 90 seconds 28#'s  Comment #3: Started to the R  Exercise #4: Quadruped Leg Extension  Comment #4: X 10  Exercise #5: TA Deadbug March  Comment #5: X 15  Exercise #6: Kneeling hip flexor stretch  Comment #6: X 30 seconds.  Exercise #7: Bridge on Ball with and without curls  Comment #7: X 10   Exercise #8: Bridge and SL bridge  Comment #8: X 10   Exercise #9: Prone Leg extension  Comment #9: X 10   Exercise #10: Physioball Ab rollout  Comment #10: X 10   Exercise #11:  Jamarcus reformer 90/90 pulldowns, tall kneeling extensions, Abductions, Leg Press  Comment #11: X 10 2 red, X 10 2 red 1 blue, X 10 2 red, X 15 all bands DL and SL  Exercise #12: Physioball Back Extensions  Comment #12: X 10   Exercise #13: Lateral Band walks  Comment #13: X 10 green     Treatment Today     TREATMENT MINUTES COMMENTS   Evaluation     Self-care/ Home management     Manual therapy     Neuromuscular Re-education     Therapeutic Activity     Therapeutic Exercises 21 Test on MEDX, reassessment of mobility, review of HEP   Gait training     Modality__________________                Total 21    Blank areas are intentional and mean the treatment did not include these items.       Ming BOLANOS  7/26/2017

## 2021-06-12 NOTE — PROGRESS NOTES
[facility] Discharge Summary  Patient Name: Tosha Rosales  Date: 9/1/2017  Referral Diagnosis:  MEDX  Lumbar facet arthropathy [M12.88]  - Primary       Lumbago [M54.5]       Referring provider: Jewell Millan PA-C  Visit Diagnosis:   1. Chronic bilateral low back pain without sciatica     2. Facet arthropathy, lumbar     3. Weakness of back         Goals:  Pt. will be independent with home exercise program in : 4 weeks Met  Pt. will report decreased intensity, frequency of : Pain;in 4 weeks;Comment  Comment:: 50% Met  Patient will decrease : LEONEL score;for improved quality of function;by _ points  by ___ points: 30% Met  Pt will: be able to lift and exercise more than she does now (pushups, yoga) without increased pain in 6 weeks: stronger, but has not tried yoga yet (had plans to try)  Pt will: be able to do her gardening without increased back pain in 6 weeks: doing it all, still tougher with the longer days.     Patient was seen for 8 visits from 7/3/17 to 7/26/17 with 0 missed appointments.    The patient met goals and has demonstrated understanding of and independence in the home program for self-care, and progression to next steps.  She will initiate contact if questions or concerns arise.    Therapy will be discontinued at this time.  The patient will need a new referral to resume.    Thank you for your referral.  Ming BOLANOS  9/1/2017  9:36 AM

## 2021-06-12 NOTE — PROGRESS NOTES
[facility] Follow Up Note    Assessment/Plan:    1. Routine general medical examination at a health care facility  We will obtain fasting blood work today.  She will get pneumonia shot since she has had history of pneumonia in the past.  I recommended that she gets mammogram.  She is up-to-date on colonoscopy and Pap.    - Pneumococcal polysaccharide vaccine 23-valent 3 yo or older, subq/IM  - Mammo Screening Bilateral; Future  - Hepatitis C Antibody (Anti-HCV)    2. Atrophic vaginitis  Provided  - estradioL (ESTRACE) 0.01 % (0.1 mg/gram) vaginal cream; Insert 1 gm vaginally once weekly  Dispense: 42.5 g; Refill: 11  - HM1(CBC and Differential)    3. Hyperlipidemia LDL goal <130  LDL is around 160 last year.  She decreased amount of red meat in her diet.  She does not have any other risk factors.  We will monitor it.  - Lipid Parrish FASTING  - Comprehensive Metabolic Panel  - Thyroid Stimulating Hormone (TSH)      Marya Hartley MD    Chief Complaint:  Chief Complaint   Patient presents with     Annual Exam       History of Present Illness:  Tosha is a 65 y.o. female who is an avid , with hyperlipidemia , atrophic vaginitis, colon polyps, chronic thrombocytopenia, facet arthropathy, number skin tags who is currently here for  a physical.    Overall she has been doing well.  She wants to work on improving her muscle strength that she has not been going to the gym and we discussed doing Pilates.  Currently she does walk and does a lot of gardening and denies any chest pain palpitations or shortness of breath.    She is up-to-date on flu shot.  She has had history of pneumonia in the past and would like to get a pneumonia shot today which was administered.    She does have moderately elevated cholesterol.  She tried decreasing her red meat consumption.  We are monitoring it.  Her blood pressure is normal.       Review of Systems:  A comprehensive review of systems was performed and was  "otherwise negative    PFSH:  Social History: Reviewed  Social History     Tobacco Use   Smoking Status Never Smoker   Smokeless Tobacco Never Used     Social History     Social History Narrative    Lives with her , Srini.  Works for [employer]  [place].  Two daughters (Beti) and Maegan ([facility], [facility]).         Past History: Reviewed  Current Outpatient Medications   Medication Sig Dispense Refill     azelaic acid (FINACEA) 15 % gel Apply small amount to face daily 30 g 3     cholecalciferol, vitamin D3, 1,000 unit capsule Take 1,000 Units by mouth daily.       estradioL (ESTRACE) 0.01 % (0.1 mg/gram) vaginal cream Insert 1 gm vaginally once weekly 42.5 g 11     multivitamin (ONE A DAY) per tablet Take 1 tablet by mouth.       No current facility-administered medications for this visit.        Family History: Reviewed    Physical Exam:    Vitals:    11/05/20 0933   BP: 124/74   Patient Site: Left Arm   Patient Position: Sitting   Cuff Size: Adult Regular   Pulse: 80   Resp: 20   SpO2: 99%   Weight: 174 lb (78.9 kg)   Height: 5' 7\" (1.702 m)     Wt Readings from Last 3 Encounters:   11/05/20 174 lb (78.9 kg)   01/09/20 174 lb (78.9 kg)   10/07/19 175 lb (79.4 kg)     Body mass index is 27.25 kg/m .    Constitutional:  Reveals a pleasant female.  Vitals:  Per nursing notes.  HEENT:No cervical LAD, no thyromegaly,  conjunctiva is pink, no scleral icterus, TMs are visualized and normal bl, oropharynx is clear, no exudates,   Cardiac:  Regular rate and rhythm,no murmurs, rubs, or gallops. Carotids without bruits. Legs without edema. Palpation of the radial pulse regular.  Lungs: Clear to auscultation bl.  Respiratory effort normal.  Abdomen:positive BS, soft, nontender, nondistended.  No hepato-splenomagaly  Skin:   Without rash, bruise, or palpable lesions.  Multiple seborrheic keratosis noted on her back and some on the bridge of her nose.  Rheumatologic: Normal joints and nails of the " hands.  Neurologic:  Cranial nerves II-XII intact.     Psychiatric: affect appropriate, memory intact.   Breast exam: No axilla lymphadenopathy, breast masses or skin changes appreciated    Data Review:    Analysis and Summary of Old Records (2): yes      Records Requested (1): no      Other History Summarized (from other people in the room) (2): no    Radiology Tests Summarized (XRAY/CT/MRI/DXA) (1): no    Labs Reviewed (1): yes    Medicine Tests Reviewed (EKG/ECHO/COLONOSCOPY/EGD) (1): colo    Independent Review of EKG or X-RAY (2): no

## 2021-06-12 NOTE — PATIENT INSTRUCTIONS - HE
1. 1200 mg Calcium a day. Each dairy portion is 300 mg.    2. Pneumonia shot today    3, Mammogram    4. Try polaytees

## 2021-06-15 NOTE — PROGRESS NOTES
[facility] Follow Up Note    Assessment/Plan:  1. Sinusitis  Symptoms started 1 day ago so most likely it is viral.  We discussed using Flonase daily and saline 3 times a day.  If on the weekend symptoms intensify did give her prescription for antibiotic just in case.    2. URI (upper respiratory infection)  Influenza screen was negative.  - Influenza A/B Rapid Test    Marya Hartley MD    Chief Complaint:  Chief Complaint   Patient presents with     Cough     weeks     Nasal Congestion       History of Present Illness:  Tosha is a 62 y.o. female with history of mild hyperlipidemia and atrophic vaginitis who is currently here to meet me for the first time and address an acute issue.    Patient has had several upper respiratory infections this year.  She reports that in mid November she had upper respiratory infection for about 3 weeks.  She felt lousy and had to stay home for 3 or 4 days but subsequently completely recovered from that.  10 days after recovery she developed recurrent upper respiratory infection but milder.  It happened at the end of November.  She was sick for 1 week his postnasal drainage and sore throat and then has gotten better.  Most recently he started having symptoms yesterday.  She describes neck pain, muscle aches some mild cough which is nonproductive and sinus congestion and pain in the right maxillary sinuses.  She feels tired but denies any fevers or chills.  She has no history of asthma or smoking.  Her mom has dementia and lives in a nursing home and she frequently visits her there as well as take her to the emergency room where he is exposed to germs.  Over-the-counter she has been taking some ibuprofen.  She has gotten a flu shot this year.    Review of Systems:  A comprehensive review of systems was performed and was otherwise negative    PFSH:  Social History: Reviewed  History   Smoking Status     Never Smoker   Smokeless Tobacco     Not on file     Social History      Social History Narrative    Lives with her , Srini.  Works for [employer] .  Two daughters (Beti) and Maegan ([facility], [facility]).         Past History: Reviewed  Current Outpatient Prescriptions   Medication Sig Dispense Refill     azelaic acid (FINACEA) 15 % gel Apply topically.       calcium-vitamin D3-vitamin K 500-100-40 mg-unit-mcg Chew Chew 1 tablet.       ESTRACE 0.01 % (0.1 mg/gram) vaginal cream INSERT 2 G INTO THE VAGINA ONCE WEEKLY. 42.5 g 5     multivitamin (ONE A DAY) per tablet Take 1 tablet by mouth.       No current facility-administered medications for this visit.        Physical Exam:    Vitals:    12/29/17 1615   BP: 124/76   Patient Site: Left Arm   Patient Position: Sitting   Cuff Size: Adult Regular   Pulse: (!) 104   Temp: 98.5  F (36.9  C)   SpO2: 100%   Weight: 178 lb (80.7 kg)     Wt Readings from Last 3 Encounters:   12/29/17 178 lb (80.7 kg)   07/28/17 178 lb (80.7 kg)   06/16/17 178 lb (80.7 kg)     Body mass index is 27.88 kg/(m^2).    Constitutional:  Reveals a pleasant female.  Vitals:  Per nursing notes.  HEENT:No cervical LAD, no thyromegaly,  conjunctiva is pink, no scleral icterus, TMs are visualized and normal bl, oropharynx is clear, no exudates, mild nasal congestion with sinus tenderness to palpation the right maxillary sinus.  Cardiac:  Regular rate and rhythm,no murmurs, slightly tachycardic,  Legs without edema. Palpation of the radial pulse regular.  Lungs: Clear to auscultation bl.  Respiratory effort normal.  No wheezes or rales, when she coughs there is no mucus trapping.  Abdomen:positive BS, soft, nontender, nondistended.  No hepato-splenomagaly  Skin:   Without rash, bruise, or palpable lesions.  Psychiatric: affect appropriate, memory intact.       Data Review:    Analysis and Summary of Old Records (2): Yes    Records Requested (1): No    Other History Summarized (from other people in the room) (2): No    Radiology Tests Summarized  (XRAY/CT/MRI/DXA) (1): No    Labs Reviewed (1): Yes    Medicine Tests Reviewed (EKG/ECHO/COLONOSCOPY/EGD) (1): No    Independent Review of EKG or X-RAY (2): No

## 2021-06-16 PROBLEM — M25.50 POLYARTHRALGIA: Status: ACTIVE | Noted: 2019-02-14

## 2021-06-16 PROBLEM — M19.049 PRIMARY LOCALIZED OSTEOARTHROSIS, HAND: Status: ACTIVE | Noted: 2019-02-14

## 2021-06-16 PROBLEM — M21.41 PES PLANUS OF BOTH FEET: Status: ACTIVE | Noted: 2019-10-07

## 2021-06-16 PROBLEM — M79.604 PAIN OF RIGHT LOWER EXTREMITY: Status: ACTIVE | Noted: 2019-10-07

## 2021-06-16 PROBLEM — I87.303 VENOUS HYPERTENSION OF LOWER EXTREMITY, BILATERAL: Status: ACTIVE | Noted: 2019-10-07

## 2021-06-16 PROBLEM — M21.42 PES PLANUS OF BOTH FEET: Status: ACTIVE | Noted: 2019-10-07

## 2021-06-16 PROBLEM — I87.2 VENOUS INSUFFICIENCY OF BOTH LOWER EXTREMITIES: Status: ACTIVE | Noted: 2019-10-07

## 2021-06-16 PROBLEM — M79.89 LEG SWELLING: Status: ACTIVE | Noted: 2019-10-07

## 2021-06-17 NOTE — PATIENT INSTRUCTIONS - HE
Patient Instructions by Kavya Holland PT at 8/13/2019 12:00 PM     Author: Kavya Holland PT Service: -- Author Type: Physical Therapist    Filed: 8/13/2019 12:28 PM Encounter Date: 8/13/2019 Status: Signed    : Kavya Holland PT (Physical Therapist)        SINGLE LEG STANCE - SLS    Stand on one leg and maintain your balance with your eyes closed or on a pillow. Hold 30 sec, 2-3 times on each leg         TOWEL SCRUNCH    Sit with a towel on a smooth surface. Place your foot on the towel. Using your toes, scrunch the towel up. Unfold fold the towel and repeat the process.    Do 2-3 minutes          Toe yoga: work on big toe lifts, 4 outside toe lift and spreading toes apart for several minutes

## 2021-06-17 NOTE — PATIENT INSTRUCTIONS - HE
Patient Instructions by Kavya Holland PT at 7/24/2019  1:30 PM     Author: Kavya Holland PT Service: -- Author Type: Physical Therapist    Filed: 7/24/2019  2:23 PM Encounter Date: 7/24/2019 Status: Signed    : Kavya Holland PT (Physical Therapist)           STANDING HEEL RAISES    While standing, raise up on your toes as you lift your heels off the ground.    30 reps, work up to 2-3 sets, once per day      GASTROCNEMIUS STRETCH    While standing and leaning against a wall, place one foot back behind you and bend the front knee until a gentle stretch is felt on the back of the lower leg.     Your back knee should be straight the entire time.    Hold 30 sec, 2-3 times on each foot, once per day      RESISTED EVERSION    Using an elastic band attached to your foot, hook it under your opposite foot and up to your hand.     Next, draw the band outwards to the side.     Be sure to keep your heel in contact with the floor the entire time.    20-30 reps, work up to 3 sets, once per day, focus on right foot

## 2021-06-18 NOTE — PROGRESS NOTES
FEMALE PREVENTATIVE EXAM    Assessment and Plan:       [facility] Follow Up Note    Assessment/Plan:  1. Annual physical exam  We will obtain report of colonoscopy which was done last year.  Patient is due for Pap smear and that was obtained today.  If results are normal he will be her last Pap smear.  Discussed shingles drinks vaccine.  Patient will call her insurance company to see whether she should have it done in the office or at the pharmacy.  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  She reports that it is genetic and her dad had the same as well.  Will check levels today  - HM1(CBC and Differential)  - Vitamin B12  - Folate, Serum  - HM1 (CBC with Diff)    3. Hyperlipidemia  Mild, patient is not overweight.  Will check fasting blood work  - Comprehensive Metabolic Panel  - Lipid Cascade  - Thyroid Stimulating Hormone (TSH)    4. Vitamin D deficiency  She is on 2000 units a day  - Vitamin D, Total (25-Hydroxy)    5. Renal cyst  Was noted on MRI of her lumbar spine last year.  Discussed checking an ultrasound to make sure we better visualize it  - US Kidney Bilateral; Future    6.  Right shoulder pain.  Symptoms are mild.  She has full range of motion.  Discussed that most likely rotator cuff tendinitis.  She can take small dose of ibuprofen intermittently, avoid heavy lifting and if symptoms progress sign up for physical therapy.    Marya Hartley MD    Chief Complaint:  Chief Complaint   Patient presents with     Annual Exam     colon done last summer, pap UTD     Shoulder Pain     right - worst x1 month - sore when extented       History of Present Illness:  Tosha is a 63 y.o. female who is an avid , with hyperlipidemia , atrophic vaginitis, colon polyps, chronic thrombocytopenia, facet arthropathy who is currently here  for a physical exam.    She had several concerns.  She reports mild pain on the right side of her shoulder which started couple of weeks ago.  She sleeps on the right side.  She also is a  and most recently was installing a large gait which involved a lot of lifting.  Currently there is no weakness or decreased in range of motion.  Based on exam I suspect that she has mild rotator cuff tendinitis.  We discussed occasional NSAIDs (she will have to limit those due to her history of thrombocytopenia), avoiding lifting heavy things and if symptoms get worse a course of physical therapy.    Patient also has occasional dizziness when she lies down or sits up.  She describes it more of a woozy sensation.  She is not on any blood pressure medications.  Known sinus problems or congestion.  No recent upper respiratory infections.  Symptoms are usually transient.  Discussed that it could be related to either ear.    Health maintenance: Patient reports having colonoscopy done in 2017 at [facility].  Due to history of polyps she does it every 5 years.  She will last Pap smear was in 2015 H was negative but HPV was not done.  Will do repeat Pap smear today.  She is due for mammogram.  In the past she had benign cyst excision but no other abnormalities.  No first-degree relatives with breast cancer.  Patient does have atrophic vaginitis and uses estrogen cream once a week on a regular basis.  She denies any breakthrough bleeding.  Her menopause took place in 2007.  No itching no vaginal discharge.  She is sexually active.  Is currently on vitamin D 2000 units a day.    Review of Systems:  A comprehensive review of systems was performed and was otherwise negative.  No urinary problems.  Bowels are regular, no blood in the stool.  She exercises regularly by walking and once a week in the gym and also does a lot of gardening and denies any symptoms.  Due to history of multiple moles she has appointment with a dermatologist  "scheduled.    PFSH:  Social History: Reviewed  History   Smoking Status     Never Smoker   Smokeless Tobacco     Never Used     Social History     Social History Narrative    Lives with her , Srini.  Works for [employer] .  Two daughters (Beti) and Maegan ([facility], [facility]).         Past History: Reviewed  Current Outpatient Prescriptions   Medication Sig Dispense Refill     azelaic acid (FINACEA) 15 % gel Apply topically.       calcium-vitamin D3-vitamin K 500-100-40 mg-unit-mcg Chew Chew 1 tablet.       ESTRACE 0.01 % (0.1 mg/gram) vaginal cream INSERT 2 G INTO THE VAGINA ONCE WEEKLY. 42.5 g 5     multivitamin (ONE A DAY) per tablet Take 1 tablet by mouth.       No current facility-administered medications for this visit.        Family History: Reviewed    Physical Exam:    Vitals:    06/07/18 1129   BP: 122/82   Patient Site: Left Arm   Patient Position: Sitting   Cuff Size: Adult Regular   Pulse: 78   Resp: 14   SpO2: 98%   Weight: 171 lb 11.2 oz (77.9 kg)   Height: 5' 7\" (1.702 m)     Wt Readings from Last 3 Encounters:   06/07/18 171 lb 11.2 oz (77.9 kg)   12/29/17 178 lb (80.7 kg)   07/28/17 178 lb (80.7 kg)     Body mass index is 26.89 kg/(m^2).    Constitutional:  Reveals a pleasant female.  Vitals:  Per nursing notes.  HEENT:No cervical LAD, no thyromegaly,  conjunctiva is pink, no scleral icterus, TMs are visualized and normal bl, oropharynx is clear, no exudates,   Cardiac:  Regular rate and rhythm,no murmurs, rubs, or gallops. Legs without edema. Palpation of the radial pulse regular.  Lungs: Clear to auscultation bl.  Respiratory effort normal.  Abdomen:positive BS, soft, nontender, nondistended.  No hepato-splenomagaly  Skin:   Without rash, bruise, she does have multiple seborrheic keratosis.  Because she also has numerous small she has appointment with the dermatologist scheduled  Neurologic:  Cranial nerves II-XII intact.     Psychiatric: affect appropriate, memory intact.   Breast " exam: No axilla lymphadenopathy, breast masses or skin changes appreciated.  Gyn: normal external genitalia, mild vaginal d/c, no pain/masses on bimanual exam.    Data Review:    Analysis and Summary of Old Records (2): Yes    Records Requested (1): Yes colonoscopy    Other History Summarized (from other people in the room) (2): No    Radiology Tests Summarized (XRAY/CT/MRI/DXA) (1): Yes lumbar MRI    Labs Reviewed (1): Yes    Medicine Tests Reviewed (EKG/ECHO/COLONOSCOPY/EGD) (1): No    Independent Review of EKG or X-RAY (2): No

## 2021-06-22 NOTE — PROGRESS NOTES
[facility] Follow Up Note    Assessment/Plan:  1. Pain of fingers of right hand  Currently patient has had persistent swelling in the joints which has not improved in the last 2 months.  She denies any acute trauma and fracture is less likely.  Currently suspect inflammatory arthritis.  Because PEEP patient is unable to take NSAIDs on a regular basis due to chronic thrombocytopenia we will try topical Voltaren gel.  We will screen for gout, Lyme's disease and rheumatological disorder.  We will get an x-ray to evaluate for arthritic changes or erosions.  I recommended that patient sees rheumatologist.  - diclofenac sodium (VOLTAREN) 1 % Gel; Apply 1 gm 3 times a day.  Dispense: 100 g; Refill: 3  - HM1(CBC and Differential)  - Lyme Antibody Cascade  - Uric Acid  - Rheumatoid Factor Quant  - CCP Antibodies  - Antinuclear Antibody (LISETH) Cascade  - Erythrocyte Sedimentation Rate  - HM1 (CBC with Diff)  - Ambulatory referral to Rheumatology  - Basic Metabolic Panel    Marya Hartley MD    Chief Complaint:  Chief Complaint   Patient presents with     Hand Pain     laft hand injuried on October, but still swelling, it hurt and fell pain on middle figure       History of Present Illness:  Tosha is a 63 y.o. female who is an avid , with hyperlipidemia , atrophic vaginitis, colon polyps, chronic thrombocytopenia, facet arthropathy who is currently here for acute visit due to persistent swelling and pain in the 3 phalanges of her right hand..    Patient reports that the end of September she had a gardening project which required her to pull a lot of sawed off with her hands.  She was pulling with both hands and had mild swelling in both hands subsequently.  She took an ibuprofen and used some ice and left hand improved however right hand continued to have swollen and hot and painful MTP joints in the second third and fourth digit.  Currently symptoms have persisted for the last 2 months.  Any type of  exertion (using scissors) makes symptoms worse.  Patient does have to type a lot for work and is crampy at the end of the day.  Because she has low platelet count she is unable to take ibuprofen on a chronic basis but does take it occasionally which helps.  She denies any other joint involvements or stiffness in the morning.  No fevers or chills.  Family history of rheumatoid arthritis    Review of Systems:  A comprehensive review of systems was performed and was otherwise negative    PFSH:  Social History: Reviewed  Social History     Tobacco Use   Smoking Status Never Smoker   Smokeless Tobacco Never Used     Social History     Social History Narrative    Lives with her , Srini.  Works for [employer] .  Two daughters (Beti) and Maegan ([facility], [facility]).         Past History: Reviewed  Current Outpatient Medications   Medication Sig Dispense Refill     azelaic acid (FINACEA) 15 % gel Apply topically.       calcium-vitamin D3-vitamin K 500-100-40 mg-unit-mcg Chew Chew 1 tablet.       estradiol (ESTRACE) 0.01 % (0.1 mg/gram) vaginal cream INSERT 2GM INTO THE VAGINA ONCE WEEKLY. 42.5 g 0     multivitamin (ONE A DAY) per tablet Take 1 tablet by mouth.       diclofenac sodium (VOLTAREN) 1 % Gel Apply 1 gm 3 times a day. 100 g 3     No current facility-administered medications for this visit.        Family History: Reviewed    Physical Exam:    Vitals:    12/03/18 1539   BP: 116/70   Patient Site: Left Arm   Patient Position: Sitting   Cuff Size: Adult Regular   Pulse: 76   SpO2: 99%   Weight: 178 lb (80.7 kg)     Wt Readings from Last 3 Encounters:   12/03/18 178 lb (80.7 kg)   06/07/18 171 lb 11.2 oz (77.9 kg)   12/29/17 178 lb (80.7 kg)     Body mass index is 27.88 kg/m .    Constitutional:  Reveals a pleasant female.  Vitals:  Per nursing notes.  HEENT:No cervical LAD, no thyromegaly,  conjunctiva is pink, no scleral icterus, TMs are visualized and normal bl, oropharynx is clear, no exudates,    Cardiac:  Regular rate and rhythm,no murmurs, rubs, or gallops Legs without edema. Palpation of the radial pulse regular.  Lungs: Clear to auscultation bl.  Respiratory effort normal.  Abdomen:positive BS, soft, nontender, nondistended.  No hepato-splenomagaly  Skin:   Without rash, bruise, or palpable lesions.  Rheumatologic: Right hand has swelling and warmth with mild pain in MTP joints with acute synovitis.  Mild decrease in  on this side due to swelling but patient is still able to do a .  Neurologic:  Cranial nerves II-XII intact.     Psychiatric: affect appropriate, memory intact.       Data Review:    Analysis and Summary of Old Records (2): Yes    Records Requested (1): No    Other History Summarized (from other people in the room) (2): No    Radiology Tests Summarized (XRAY/CT/MRI/DXA) (1): No    Labs Reviewed (1): Yes    Medicine Tests Reviewed (EKG/ECHO/COLONOSCOPY/EGD) (1): No    Independent Review of EKG or X-RAY (2): No

## 2021-06-24 NOTE — TELEPHONE ENCOUNTER
Refill Approved    Rx renewed per Medication Renewal Policy. Medication was last renewed on 6.7.18.    Melissa Henry, [facility] Triage/Med Refill 2/20/2019     Requested Prescriptions   Pending Prescriptions Disp Refills     estradiol (ESTRACE) 0.01 % (0.1 mg/gram) vaginal cream [Pharmacy Med Name: ESTRADIOL 0.01% CREAM] 42.5 g 0     Sig: INSERT 2GM INTO THE VAGINA ONCE WEEKLY.    Hormone Replacement Therapy Refill Protocol Passed - 2/17/2019  1:53 PM       Passed - PCP or prescribing provider visit in past 12 months      Last office visit with prescriber/PCP: Visit date not found OR same dept: Visit date not found OR same specialty: 12/3/2018 Marya Hartley MD  Last physical: 6/6/2017 Last MTM visit: Visit date not found     Next visit within 3 mo: Visit date not found  Next physical within 3 mo: Visit date not found  Prescriber OR PCP: Mariusz Kumari MD  Last diagnosis associated with med order: 1. Atrophic vaginitis  - estradiol (ESTRACE) 0.01 % (0.1 mg/gram) vaginal cream [Pharmacy Med Name: ESTRADIOL 0.01% CREAM]; INSERT 2GM INTO THE VAGINA ONCE WEEKLY.  Dispense: 42.5 g; Refill: 0     If protocol passes may refill for 3 months.

## 2021-06-24 NOTE — PROGRESS NOTES
ASSESSMENT AND PLAN:  Tosha Rosales 63 y.o. female is seen here on 02/14/19 for evaluation of painful joints of her hands.  This is very likely related with early osteoarthritis predominantly of the PIPs.  She has more clinical than radiologic findings.  Her right fifth digit PIP shows osteophyte formation and a tiny erosion is noted there.  She has insufficient evidence at this time for me to make or exclude the diagnosis of an inflammatory joint disease.  This is discussed with her.  In the interim I have asked her to avoid ibuprofen and see if she can find the acetaminophen sustained-release more helpful if not then take Aleve.  We will meet here in the next 3-4 months or sooner, she is to take pictures of her joints on both sides should there be recurrence of swelling.  Diagnoses and all orders for this visit:    Polyarthralgia    Primary localized osteoarthrosis of hand, unspecified laterality      HISTORY OF PRESENTING ILLNESS:  Tosha Rosales, 63 y.o., female is here for evaluation of painful joints.  She is a /, who does spend quite a lot of time typing but also some writing who reports pain in her fingers with swelling going on since September 2018.  She initially thought that this might be related with the injury that she may have sustained during her yard work.  However the symptoms continued for too long and then she was finally seen at her primary physician's office where she had workup done here in December of last year all of which is available and reviewed without evidence of autoimmunity including normal anti-CCP antibody, rheumatoid factor, LISETH.  Over time she has noted that mornings she has more swelling she points to the PIPs.  This tends to get better as the day goes by.  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  She has not had other joint areas affected similarly.  She has not  "observed swelling in the MCPs, wrists, dorsum of the hand.  She noted pain level to be 3.0/10.  Today she noted is a good day.  Morning stiffness is no more than 30 minutes.  Despite this she has been able to continue to do all her day-to-day activities without any significant difficulty.  She reports no personal or family history of psoriasis ulcerative colitis Crohn's disease.  Her daughter has psoriasis as so does her .  She is not a smoker alcohol up to 3 drinks per week.  She had rheumatic fever as a child.  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    Further historical information and ADL limitations as noted in the multidimensional health assessment questionnaire attached in the EMR. Rest of the 13 system ROS is negative.     ALLERGIES:Codeine    PAST MEDICAL/ACTIVE PROBLEMS/MEDICATION/ FAMILY HISTORY/SOCIAL DATA:  The patient has a family history of  No past medical history on file.  Social History     Tobacco Use   Smoking Status Never Smoker   Smokeless Tobacco Never Used     Patient Active Problem List   Diagnosis     Atrophic vaginitis     Polyp of colon     Hyperlipidemia     Acne erythematosa     Current Outpatient Medications   Medication Sig Dispense Refill     azelaic acid (FINACEA) 15 % gel Apply topically.       calcium-vitamin D3-vitamin K 500-100-40 mg-unit-mcg Chew Chew 1 tablet.       cholecalciferol, vitamin D3, 1,000 unit capsule Take 1,000 Units by mouth daily.       estradiol (ESTRACE) 0.01 % (0.1 mg/gram) vaginal cream INSERT 2GM INTO THE VAGINA ONCE WEEKLY. 42.5 g 0     multivitamin (ONE A DAY) per tablet Take 1 tablet by mouth.       diclofenac sodium (VOLTAREN) 1 % Gel Apply 1 gm 3 times a day. 100 g 3     No current facility-administered medications for this visit.        COMPREHENSIVE EXAMINATION:  Vitals:    02/14/19 0957   BP: 120/70   Patient Site: Right Arm   Patient Position: Sitting   Cuff Size: Adult Regular   Pulse: 80   Weight: 178 lb (80.7 kg)   Height: 5' 7\" " (1.702 m)     A well appearing alert oriented female. Vital data as noted above. Her eyes without inflammation/scleromalacia. ENTwithout oral mucositis, thrush, nasal deformity, external ear redness, deformity. Her neck is without lymphadenopathy and supple. Lungs normal sounds, no pleural rub. Heart auscultation normal rate, rhythm; no pericardial rub and murmurs. Abdomen soft, non tender, no organomegaly. Skin examined for heliotrope, malar area eruption, lupus pernio, periungual erythema, sclerodactyly, papules, erythema nodosum, purpura, nail pitting, onycholysis, and obvious psoriasis lesion. Neurological examination shows normal alertness, speech, facial symmetry, tone and power in upper and lower extremities, Tinel's and Phalen's at wrist and gait. The joint examination is performed for swelling, tenderness, warmth, erythema, and range of motion in the following joints: DIPs, PIPs, MCPs, wrists, first CMC's, elbows, shoulders, hips, knees, ankles, feet; spine for range of motion and paraspinal muscles for tenderness. The salient normal / abnormal findings are appended.  She has tenderness in her PIPs, scattered in the DIPs, she does not have synovitis in any of the palpable joints of upper extremities.  She has full range of motion in her shoulder and hip joints.  She does not have dactylitis.  There is no nail pitting onycholysis.  She has no malar area eruption.   I do not know that anybody can tell he for alek  LAB / IMAGING DATA:  ALT   Date Value Ref Range Status   06/07/2018 15 0 - 45 U/L Final     Albumin   Date Value Ref Range Status   06/07/2018 4.3 3.5 - 5.0 g/dL Final     Creatinine   Date Value Ref Range Status   12/03/2018 0.78 0.60 - 1.10 mg/dL Final   06/07/2018 0.85 0.60 - 1.10 mg/dL Final   06/06/2017 0.86 0.60 - 1.10 mg/dL Final       WBC   Date Value Ref Range Status   12/03/2018 6.7 4.0 - 11.0 thou/uL Final   09/24/2018 4.6 4.0 - 11.0 thou/uL Final   09/24/2018 4.6 4.0 - 11.0 thou/uL Final      Hemoglobin   Date Value Ref Range Status   12/03/2018 13.3 12.0 - 16.0 g/dL Final   09/24/2018 13.2 12.0 - 16.0 g/dL Final   09/24/2018 13.2 12.0 - 16.0 g/dL Final     Platelets   Date Value Ref Range Status   12/03/2018 140 140 - 440 thou/uL Final   09/24/2018 136 (L) 140 - 440 thou/uL Final   09/24/2018 136 (L) 140 - 440 thou/uL Final       Lab Results   Component Value Date    RF <15.0 12/03/2018    SEDRATE 10 12/03/2018

## 2021-06-27 ENCOUNTER — HEALTH MAINTENANCE LETTER (OUTPATIENT)
Age: 66
End: 2021-06-27

## 2021-07-03 NOTE — ADDENDUM NOTE
Addendum Note by Kelsi Dunn LPN at 8/5/2019  9:51 AM     Author: Kelsi Dunn LPN Service: -- Author Type: Licensed Nurse    Filed: 8/5/2019  9:51 AM Encounter Date: 8/4/2019 Status: Signed    : Kelsi Dunn LPN (Licensed Nurse)    Addended by: KELSI DUNN on: 8/5/2019 09:51 AM        Modules accepted: Orders

## 2021-07-03 NOTE — ADDENDUM NOTE
Addendum Note by Ken Hartley MD at 8/5/2019  2:25 PM     Author: Ken Hartley MD Service: -- Author Type: Physician    Filed: 8/5/2019  2:25 PM Encounter Date: 8/4/2019 Status: Signed    : Ken Hartley MD (Physician)    Addended by: KEN HARTLEY on: 8/5/2019 02:25 PM        Modules accepted: Orders

## 2021-07-06 ENCOUNTER — COMMUNICATION - HEALTHEAST (OUTPATIENT)
Dept: SCHEDULING | Facility: CLINIC | Age: 66
End: 2021-07-06

## 2021-07-06 NOTE — TELEPHONE ENCOUNTER
Telephone Encounter by Kandi Ndiaye RN at 7/6/2021 11:37 AM     Author: Kandi Ndiaye RN Service: -- Author Type: Registered Nurse    Filed: 7/6/2021 11:50 AM Encounter Date: 7/6/2021 Status: Signed    : Kandi Ndiaye RN (Registered Nurse)       Triage call. Patient calling.    About 12 days ago, she fell while carrying a box. Tripped over an uneven sidewalk.  She thinks she broke her fall with her arm.  She does not remember falling on her chest. No bruising.  Having pain in her right breast area near the sternum, when she coughs, straining with BM or when lifting her right arm.    Per protocol, see today in office. Advised to be seen at urgent care if no appointments available. Hours for urgent care [place] given to patient. Patient agrees to plan.  Transferred to scheduling.     Kandi Ndiaye RN 07/06/21 11:50 AM  [facility] Nurse Advisor    Reason for Disposition  ? Chest pain(s) lasting a few seconds persists > 3 days    Additional Information  ? Negative: Severe difficulty breathing (e.g., struggling for each breath, speaks in single words)  ? Negative: Passed out (i.e., fainted, collapsed and was not responding)  ? Negative: Difficult to awaken or acting confused (e.g., disoriented, slurred speech)  ? Negative: Shock suspected (e.g., cold/pale/clammy skin, too weak to stand, low BP, rapid pulse)  ? Negative: Chest pain lasting longer than 5 minutes and ANY of the following:* Over 45 years old* Over 30 years old and at least one cardiac risk factor (e.g., diabetes, high blood pressure, high cholesterol, smoker, or strong family history of heart disease)* History of heart disease (i.e., angina, heart attack, heart failure, bypass surgery, takes nitroglycerin)* Pain is crushing, pressure-like, or heavy  ? Negative: Heart beating < 50 beats per minute OR > 140 beats per minute  ? Negative: Visible sweat on face or sweat dripping down face  ? Negative: Sounds like a life-threatening  emergency to the triager  ? Negative: SEVERE chest pain  ? Negative: Pain also in shoulder(s) or arm(s) or jaw  ? Negative: Difficulty breathing  ? Negative: Cocaine use within last 3 days  ? Negative: Major surgery in the past month  ? Negative: Hip or leg fracture (broken bone) in past month (or had cast on leg or ankle in past month)  ? Negative: Illness requiring prolonged bedrest in past month (e.g., immobilization, long hospital stay)  ? Negative: Long-distance travel in past month (e.g., car, bus, train, plane; with trip lasting 6 or more hours)  ? Negative: History of prior 'blood clot' in leg or lungs (i.e., deep vein thrombosis, pulmonary embolism)  ? Negative: History of inherited increased risk of blood clots (e.g., Factor 5 Leiden, Anti-thrombin 3, Protein C or Protein S deficiency, Prothrombin mutation)  ? Negative: Heart beating irregularly or very rapidly  ? Negative: Chest pain lasting longer than 5 minutes and occurred in last 3 days (72 hours) (Exception: feels exactly the same as previously diagnosed heartburn and has accompanying sour taste in mouth)  ? Negative: Chest pain or 'angina' comes and goes and is happening more often (increasing in frequency) or getting worse (increasing in severity) (Exception: chest pains that last only a few seconds)  ? Negative: Dizziness or lightheadedness  ? Negative: Coughing up blood  ? Negative: Patient sounds very sick or weak to the triager  ? Negative: Cancer treatment in the past two months (or has cancer now)  ? Negative: Patient says chest pain feels exactly the same as previously diagnosed 'heartburn'  and  describes burning in chest and accompanying sour taste in mouth  ? Negative: Fever > 100.4 F (38.0 C)    Protocols used: CHEST PAIN-A-OH    COVID 19 Nurse Triage Plan/Patient Instructions    Please be aware that novel coronavirus (COVID-19) may be circulating in the community. If you develop symptoms such as fever, cough, or SOB or if you have  concerns about the presence of another infection including coronavirus (COVID-19), please contact your health care provider or visit  [email or web address].    Disposition/Instructions    In-Person Visit with provider recommended. Reference Visit Selection Guide.    Thank you for taking steps to prevent the spread of this virus.  o Limit your contact with others.  o Wear a simple mask to cover your cough.  o Wash your hands well and often.    Resources    [facility]: About COVID-19: [email or web address]    CDC: What to Do If You're Sick: www.cdc.gov/coronavirus/2019-ncov/about/steps-when-sick.html    CDC: Ending Home Isolation: www.cdc.gov/coronavirus/2019-ncov/hcp/disposition-in-home-patients.html     CDC: Caring for Someone: www.cdc.gov/coronavirus/2019-ncov/if-you-are-sick/care-for-someone.html     [facility]: Interim Guidance for Hospital Discharge to Home: www.[email or web address].mn.us/diseases/coronavirus/hcp/hospdischarge.pdf    [facility] clinical trials (COVID-19 research studies): clinicalaffairs.[facility]-clinical-trials     Below are the COVID-19 hotlines at the [place] Department of [facility] ([facility]). Interpreters are available.   o For health questions: Call 758-236-1376 or 1-352.530.6785 (7 a.m. to 7 p.m.)  o For questions about schools and childcare: Call 735-626-9251 or 1-406.840.8516 (7 a.m. to 7 p.m.)

## 2021-10-17 ENCOUNTER — HEALTH MAINTENANCE LETTER (OUTPATIENT)
Age: 66
End: 2021-10-17

## 2021-11-09 SDOH — ECONOMIC STABILITY: INCOME INSECURITY: IN THE LAST 12 MONTHS, WAS THERE A TIME WHEN YOU WERE NOT ABLE TO PAY THE MORTGAGE OR RENT ON TIME?: NO

## 2021-11-09 SDOH — ECONOMIC STABILITY: INCOME INSECURITY: HOW HARD IS IT FOR YOU TO PAY FOR THE VERY BASICS LIKE FOOD, HOUSING, MEDICAL CARE, AND HEATING?: NOT HARD AT ALL

## 2021-11-09 SDOH — HEALTH STABILITY: PHYSICAL HEALTH: ON AVERAGE, HOW MANY DAYS PER WEEK DO YOU ENGAGE IN MODERATE TO STRENUOUS EXERCISE (LIKE A BRISK WALK)?: 5 DAYS

## 2021-11-09 SDOH — HEALTH STABILITY: PHYSICAL HEALTH: ON AVERAGE, HOW MANY MINUTES DO YOU ENGAGE IN EXERCISE AT THIS LEVEL?: 50 MIN

## 2021-11-09 SDOH — ECONOMIC STABILITY: TRANSPORTATION INSECURITY
IN THE PAST 12 MONTHS, HAS THE LACK OF TRANSPORTATION KEPT YOU FROM MEDICAL APPOINTMENTS OR FROM GETTING MEDICATIONS?: NO

## 2021-11-09 SDOH — ECONOMIC STABILITY: FOOD INSECURITY: WITHIN THE PAST 12 MONTHS, YOU WORRIED THAT YOUR FOOD WOULD RUN OUT BEFORE YOU GOT MONEY TO BUY MORE.: NEVER TRUE

## 2021-11-09 SDOH — ECONOMIC STABILITY: TRANSPORTATION INSECURITY
IN THE PAST 12 MONTHS, HAS LACK OF TRANSPORTATION KEPT YOU FROM MEETINGS, WORK, OR FROM GETTING THINGS NEEDED FOR DAILY LIVING?: NO

## 2021-11-09 SDOH — ECONOMIC STABILITY: FOOD INSECURITY: WITHIN THE PAST 12 MONTHS, THE FOOD YOU BOUGHT JUST DIDN'T LAST AND YOU DIDN'T HAVE MONEY TO GET MORE.: NEVER TRUE

## 2021-11-09 ASSESSMENT — SOCIAL DETERMINANTS OF HEALTH (SDOH)
HOW OFTEN DO YOU ATTEND CHURCH OR RELIGIOUS SERVICES?: MORE THAN 4 TIMES PER YEAR
IN A TYPICAL WEEK, HOW MANY TIMES DO YOU TALK ON THE PHONE WITH FAMILY, FRIENDS, OR NEIGHBORS?: MORE THAN THREE TIMES A WEEK
DO YOU BELONG TO ANY CLUBS OR ORGANIZATIONS SUCH AS CHURCH GROUPS UNIONS, FRATERNAL OR ATHLETIC GROUPS, OR SCHOOL GROUPS?: YES
HOW OFTEN DO YOU GET TOGETHER WITH FRIENDS OR RELATIVES?: TWICE A WEEK

## 2021-11-09 ASSESSMENT — LIFESTYLE VARIABLES
HOW OFTEN DO YOU HAVE SIX OR MORE DRINKS ON ONE OCCASION: NEVER
HOW OFTEN DO YOU HAVE A DRINK CONTAINING ALCOHOL: 2-4 TIMES A MONTH
HOW MANY STANDARD DRINKS CONTAINING ALCOHOL DO YOU HAVE ON A TYPICAL DAY: 1 OR 2

## 2021-11-09 ASSESSMENT — ACTIVITIES OF DAILY LIVING (ADL): CURRENT_FUNCTION: NO ASSISTANCE NEEDED

## 2021-11-12 ENCOUNTER — OFFICE VISIT (OUTPATIENT)
Dept: INTERNAL MEDICINE | Facility: CLINIC | Age: 66
End: 2021-11-12
Payer: COMMERCIAL

## 2021-11-12 VITALS
HEART RATE: 95 BPM | OXYGEN SATURATION: 99 % | BODY MASS INDEX: 26.94 KG/M2 | SYSTOLIC BLOOD PRESSURE: 144 MMHG | DIASTOLIC BLOOD PRESSURE: 76 MMHG | WEIGHT: 172 LBS

## 2021-11-12 DIAGNOSIS — E55.9 VITAMIN D DEFICIENCY: ICD-10-CM

## 2021-11-12 DIAGNOSIS — Z12.31 ENCOUNTER FOR SCREENING MAMMOGRAM FOR BREAST CANCER: ICD-10-CM

## 2021-11-12 DIAGNOSIS — Z78.0 POST-MENOPAUSAL: ICD-10-CM

## 2021-11-12 DIAGNOSIS — E78.5 HYPERLIPIDEMIA LDL GOAL <130: ICD-10-CM

## 2021-11-12 DIAGNOSIS — R03.0 ELEVATED BP WITHOUT DIAGNOSIS OF HYPERTENSION: ICD-10-CM

## 2021-11-12 DIAGNOSIS — Z00.00 ENCOUNTER FOR MEDICARE ANNUAL WELLNESS EXAM: Primary | ICD-10-CM

## 2021-11-12 LAB
ALBUMIN SERPL-MCNC: 4.1 G/DL (ref 3.5–5)
ALP SERPL-CCNC: 70 U/L (ref 45–120)
ALT SERPL W P-5'-P-CCNC: 15 U/L (ref 0–45)
ANION GAP SERPL CALCULATED.3IONS-SCNC: 10 MMOL/L (ref 5–18)
AST SERPL W P-5'-P-CCNC: 14 U/L (ref 0–40)
BASOPHILS # BLD AUTO: 0 10E3/UL (ref 0–0.2)
BASOPHILS NFR BLD AUTO: 1 %
BILIRUB SERPL-MCNC: 0.7 MG/DL (ref 0–1)
BUN SERPL-MCNC: 13 MG/DL (ref 8–22)
CALCIUM SERPL-MCNC: 10.3 MG/DL (ref 8.5–10.5)
CHLORIDE BLD-SCNC: 102 MMOL/L (ref 98–107)
CHOLEST SERPL-MCNC: 242 MG/DL
CO2 SERPL-SCNC: 28 MMOL/L (ref 22–31)
CREAT SERPL-MCNC: 0.83 MG/DL (ref 0.6–1.1)
EOSINOPHIL # BLD AUTO: 0.1 10E3/UL (ref 0–0.7)
EOSINOPHIL NFR BLD AUTO: 2 %
ERYTHROCYTE [DISTWIDTH] IN BLOOD BY AUTOMATED COUNT: 11.9 % (ref 10–15)
FASTING STATUS PATIENT QL REPORTED: YES
GFR SERPL CREATININE-BSD FRML MDRD: 74 ML/MIN/1.73M2
GLUCOSE BLD-MCNC: 94 MG/DL (ref 70–125)
HCT VFR BLD AUTO: 42.4 % (ref 35–47)
HDLC SERPL-MCNC: 66 MG/DL
HGB BLD-MCNC: 14.2 G/DL (ref 11.7–15.7)
IMM GRANULOCYTES # BLD: 0 10E3/UL
IMM GRANULOCYTES NFR BLD: 0 %
LDLC SERPL CALC-MCNC: 154 MG/DL
LYMPHOCYTES # BLD AUTO: 1.4 10E3/UL (ref 0.8–5.3)
LYMPHOCYTES NFR BLD AUTO: 33 %
MCH RBC QN AUTO: 30.3 PG (ref 26.5–33)
MCHC RBC AUTO-ENTMCNC: 33.5 G/DL (ref 31.5–36.5)
MCV RBC AUTO: 90 FL (ref 78–100)
MONOCYTES # BLD AUTO: 0.3 10E3/UL (ref 0–1.3)
MONOCYTES NFR BLD AUTO: 8 %
NEUTROPHILS # BLD AUTO: 2.5 10E3/UL (ref 1.6–8.3)
NEUTROPHILS NFR BLD AUTO: 57 %
PLATELET # BLD AUTO: 158 10E3/UL (ref 150–450)
POTASSIUM BLD-SCNC: 4.3 MMOL/L (ref 3.5–5)
PROT SERPL-MCNC: 6.8 G/DL (ref 6–8)
RBC # BLD AUTO: 4.69 10E6/UL (ref 3.8–5.2)
SODIUM SERPL-SCNC: 140 MMOL/L (ref 136–145)
TRIGL SERPL-MCNC: 109 MG/DL
TSH SERPL DL<=0.005 MIU/L-ACNC: 0.8 UIU/ML (ref 0.3–5)
WBC # BLD AUTO: 4.4 10E3/UL (ref 4–11)

## 2021-11-12 PROCEDURE — 80053 COMPREHEN METABOLIC PANEL: CPT | Performed by: INTERNAL MEDICINE

## 2021-11-12 PROCEDURE — 85025 COMPLETE CBC W/AUTO DIFF WBC: CPT | Performed by: INTERNAL MEDICINE

## 2021-11-12 PROCEDURE — 36415 COLL VENOUS BLD VENIPUNCTURE: CPT | Performed by: INTERNAL MEDICINE

## 2021-11-12 PROCEDURE — 80061 LIPID PANEL: CPT | Performed by: INTERNAL MEDICINE

## 2021-11-12 PROCEDURE — 99214 OFFICE O/P EST MOD 30 MIN: CPT | Mod: 25 | Performed by: INTERNAL MEDICINE

## 2021-11-12 PROCEDURE — 82306 VITAMIN D 25 HYDROXY: CPT | Performed by: INTERNAL MEDICINE

## 2021-11-12 PROCEDURE — 84443 ASSAY THYROID STIM HORMONE: CPT | Performed by: INTERNAL MEDICINE

## 2021-11-12 PROCEDURE — G0402 INITIAL PREVENTIVE EXAM: HCPCS | Performed by: INTERNAL MEDICINE

## 2021-11-12 ASSESSMENT — ACTIVITIES OF DAILY LIVING (ADL): CURRENT_FUNCTION: NO ASSISTANCE NEEDED

## 2021-11-12 NOTE — PATIENT INSTRUCTIONS
1. Get unsalted nuts, check b/p at home, let me know if b/p at home >130/80    2. Colonoscopy is due in August 2022    3. Have mammogram and bone density test done.      Patient Education   Personalized Prevention Plan  You are due for the preventive services outlined below.  Your care team is available to assist you in scheduling these services.  If you have already completed any of these items, please share that information with your care team to update in your medical record.  Health Maintenance Due   Topic Date Due     Osteoporosis Screening  Never done     ANNUAL REVIEW OF HM ORDERS  Never done     Flu Vaccine (1) 09/01/2021

## 2021-11-12 NOTE — PROGRESS NOTES
"Answers for HPI/ROS submitted by the patient on 11/9/2021  In general, how would you rate your overall physical health?: excellent  Frequency of exercise:: 2-3 days/week  Do you usually eat at least 4 servings of fruit and vegetables a day, include whole grains & fiber, and avoid regularly eating high fat or \"junk\" foods? : Yes  Taking medications regularly:: Yes  Medication side effects:: None  Activities of Daily Living: no assistance needed  Home safety: lack of grab bars in the bathroom  Hearing Impairment:: no hearing concerns  In the past 6 months, have you been bothered by leaking of urine?: No  In general, how would you rate your overall mental or emotional health?: excellent  Additional concerns today:: No  Duration of exercise:: 30-45 minutes    SUBJECTIVE:   Tosha Rosales is a 66 year old female who presents for Preventive Visit.    Tosha is a 66 y.o. female who is an avid , with hyperlipidemia , atrophic vaginitis, colon polyps, chronic thrombocytopenia, facet arthropathy, number skin tags who is currently here for  a physical.    No concerns or complaints.    She did have 2 mechanical falls last year, usually she would carry something and tripped.  She denies significant injuries but has developed slightly protuberant hypertrophy below her right knee.    She has been staying active this summer and walking 45 minutes 5 days a week and plans to start exercising at [facility] over the winter.    She is taking multivitamin, vitamin D 1000 units and calcium 500 mg a day.    She does wear glasses and up-to-date on eye exam with his ophthalmologist which happened this year.  She did get new prescription.    No hearing problems.    Patient has been advised of split billing requirements and indicates understanding: Yes   Are you in the first 12 months of your Medicare coverage?  No    Healthy Habits:     In general, how would you rate your overall health?  Excellent    Frequency of exercise:  2-3 days/week    " "Duration of exercise:  30-45 minutes    Do you usually eat at least 4 servings of fruit and vegetables a day, include whole grains    & fiber and avoid regularly eating high fat or \"junk\" foods?  Yes    Taking medications regularly:  Yes    Medication side effects:  None    Ability to successfully perform activities of daily living:  No assistance needed    Home Safety:  Lack of grab bars in the bathroom    Hearing Impairment:  No hearing concerns    In the past 6 months, have you been bothered by leaking of urine?  No    In general, how would you rate your overall mental or emotional health?  Excellent      PHQ-2 Total Score: 0    Additional concerns today:  No    Do you feel safe in your environment? Yes    Have you ever done Advance Care Planning? (For example, a Health Directive, POLST, or a discussion with a medical provider or your loved ones about your wishes): Yes, advance care planning is on file.  Cognitive Screening   1) Repeat 3 items (Leader, Season, Table)    2) Clock draw: NORMAL  3) 3 item recall: Recalls 3 objects  Results: 3 items recalled: COGNITIVE IMPAIRMENT LESS LIKELY      Do you have sleep apnea, excessive snoring or daytime drowsiness?: no    Reviewed and updated as needed this visit by clinical staff  Tobacco  Allergies  Meds   Med Hx  Surg Hx  Fam Hx  Soc Hx       Reviewed and updated as needed this visit by Provider               Social History     Tobacco Use     Smoking status: Never Smoker     Smokeless tobacco: Never Used   Substance Use Topics     Alcohol use: Yes     Alcohol/week: 6.0 standard drinks     Comment: Alcoholic Drinks/day: moderately       Alcohol Use 11/9/2021   Prescreen: >3 drinks/day or >7 drinks/week? No   Patient Care Team:  Marya Hartley MD as PCP - General (Internal Medicine)  Marya Hartley MD as Assigned PCP    The following health maintenance items are reviewed in Epic and correct as of today:  Health Maintenance Due   Topic Date Due     DEXA  " "Never done     ANNUAL REVIEW OF HM ORDERS  Never done     ADVANCE CARE PLANNING  Never done     INFLUENZA VACCINE (1) 09/01/2021     MEDICARE ANNUAL WELLNESS VISIT  11/05/2021     Breast CA Risk Assessment (FHS-7) 11/9/2021   Did any of your first-degree relatives have breast or ovarian cancer? No   Did any of your relatives have bilateral breast cancer? No   Did any man in your family have breast cancer? No   Did any woman in your family have breast and ovarian cancer? Yes   Did any woman in your family have breast cancer before age 50 y? No   Do you have 2 or more relatives with breast and/or ovarian cancer? No   Do you have 2 or more relatives with breast and/or bowel cancer? No   Review of Systems  As above, she sleeps well and denies daytime fatigue although she does snore.  No depression or anxiety.  No exertional shortness of breath chest pains or change in exercise tolerance.  No problems with breathing.  Bowels are normal, no blood in the stool, no urinary problems, no vaginal spotting.    OBJECTIVE:   BP (!) 144/76 (BP Location: Left arm, Patient Position: Sitting, Cuff Size: Adult Regular)   Pulse 95   Wt 78 kg (172 lb)   SpO2 99%   BMI 26.94 kg/m   Estimated body mass index is 28.36 kg/m  as calculated from the following:    Height as of 11/5/20: 1.702 m (5' 7\").    Weight as of 7/7/21: 82.1 kg (181 lb 1.6 oz).  Physical Exam  General: well appearing female, alert and oriented x3  EYES: Eyelids, conjunctiva, and sclera were normal. Pupils were normal.   HEAD, EARS, NOSE, MOUTH, AND THROAT: no cervical LAD, no thyromegaly or nodules appreciated. TMs are visualized and normal, oropharynx is clear but crowded.  RESPIRATORY: respirations non labored, CTA bl, no wheezes, rales, no forced expiratory wheezing.  CARDIOVASCULAR: Heart rate and rhythm were normal. No murmurs, rubs,gallops. There was no peripheral edema. No carotid bruits.  GASTROINTESTINAL: Positive bowel sounds, abdomen is soft, non tender, " "non distended.     MUSCULOSKELETAL: Muscle mass was normal for age. No joint synovitis or deformity.  LYMPHATIC: There were no enlarged nodes palpable.  SKIN/HAIR/NAILS: Skin color was normal.  No rashes.  She does have numerous seborrheic keratosis, she does see a dermatologist once a year.  NEUROLOGIC: The patient was alert and oriented.  Speech was normal.  There is no facial asymmetry.   PSYCHIATRIC:  Mood and affect were normal.   Exam: No axilla lymphadenopathy, breast masses or skin changes appreciated      ASSESSMENT / PLAN:   Tosha was seen today for physical.    Diagnoses and all orders for this visit:    Encounter for Medicare annual wellness exam  Bone density test and mammograms were ordered.  Due to history of colon polyps she will be due for colonoscopy in August 2022.  She is up-to-date on Pap smears and disc as it would not need any further Pap smears screenings.  She is up-to-date on flu shot and Covid booster shot.  -     CBC with platelets and differential; Future  -     DEXA HIP/PELVIS/SPINE - Future; Future  -     MA Screen Bilateral w/[place]; Future  -     Vitamin D Deficiency; Future    Hyperlipidemia LDL goal <130  Last year LDL was in 140s.  Will repeat fasting lipid panel today.  -     Lipid panel reflex to direct LDL Fasting; Future  -     Comprehensive metabolic panel; Future  -     TSH; Future    Elevated BP without diagnosis of hypertension  Blood pressure today is above goal.  Discussed to cut back on salt including salted nuts, decrease coffee intake and monitor blood pressures at home.  If it continues to be above 130/80 she may need to start on a small dose of blood pressure medication.  She does have family history of hypertension.    Other orders  -     REVIEW OF HEALTH MAINTENANCE PROTOCOL ORDERS          Estimated body mass index is 28.36 kg/m  as calculated from the following:    Height as of 11/5/20: 1.702 m (5' 7\").    Weight as of 7/7/21: 82.1 kg (181 lb 1.6 oz).    She " reports that she has never smoked. She has never used smokeless tobacco.      Appropriate preventive services were discussed with this patient, including applicable screening as appropriate for cardiovascular disease, diabetes, osteopenia/osteoporosis, and glaucoma.  As appropriate for age/gender, discussed screening for colorectal cancer, prostate cancer, breast cancer, and cervical cancer. Checklist reviewing preventive services available has been given to the patient.    Reviewed patients plan of care and provided an AVS. The Basic Care Plan (routine screening as documented in Health Maintenance) for Tosha meets the Care Plan requirement. This Care Plan has been established and reviewed with the Patient.    Marya Hartley MD  [facility]    Identified Health Risks:

## 2021-11-15 LAB — DEPRECATED CALCIDIOL+CALCIFEROL SERPL-MC: 38 UG/L (ref 30–80)

## 2021-11-29 ENCOUNTER — ANCILLARY PROCEDURE (OUTPATIENT)
Dept: MAMMOGRAPHY | Facility: CLINIC | Age: 66
End: 2021-11-29
Attending: INTERNAL MEDICINE
Payer: COMMERCIAL

## 2021-11-29 ENCOUNTER — ANCILLARY PROCEDURE (OUTPATIENT)
Dept: BONE DENSITY | Facility: CLINIC | Age: 66
End: 2021-11-29
Attending: INTERNAL MEDICINE
Payer: COMMERCIAL

## 2021-11-29 DIAGNOSIS — Z12.31 ENCOUNTER FOR SCREENING MAMMOGRAM FOR BREAST CANCER: ICD-10-CM

## 2021-11-29 DIAGNOSIS — Z78.0 POST-MENOPAUSAL: ICD-10-CM

## 2021-11-29 PROCEDURE — 77080 DXA BONE DENSITY AXIAL: CPT | Performed by: INTERNAL MEDICINE

## 2021-11-29 PROCEDURE — 77063 BREAST TOMOSYNTHESIS BI: CPT | Mod: TC | Performed by: INTERNAL MEDICINE

## 2021-11-29 PROCEDURE — 77067 SCR MAMMO BI INCL CAD: CPT | Mod: TC | Performed by: INTERNAL MEDICINE

## 2022-04-08 NOTE — PROGRESS NOTES
[facility] Daily Progress     Patient Name: Tosha Rosales  Date: 7/17/2017  Visit #: 5   PTA visit #:  -  Referral Diagnosis:   MEDX  Lumbar facet arthropathy [M12.88]  - Primary       Lumbago [M54.5]       Referring provider: Jewell Millan PA-C  Visit Diagnosis:     ICD-10-CM    1. Chronic bilateral low back pain without sciatica M54.5     G89.29    2. Facet arthropathy, lumbar M12.88    3. Weakness of back M62.81      Tosha Rosales is a 62 y.o. female who presents to therapy today with chief complaints of chronic low back pain which started in January 2016, with insidious onset, but possibly associated with increased activity in May 2016 when she moved houses and did a lot of lifting/bending/painting/yardwork. The patient has the most difficulty with bending, lifting, and gardening due to pain. With examination today, the patient demonstrates normal and mildly painful lumbar ROM, - SI joint testing, - LE neural tension testing, back weakness on MEDX, pain with PA mobilizations to L4,5, and relief with BLE distraction. Her pain is consistent with facet arthropathy. The patient will likely benefit from skilled PT to improve strength, pain, joint mobility, and overall function.     Assessment:     HEP/POC compliance is  good .     The patient is tolerating PT well. She reports benefits from the stretching and will benefit from continued work on strengthening and lumbar stretching/mobilizations to improve function and pain.    Goal Status:  Pt. will be independent with home exercise program in : 4 weeks  Pt. will report decreased intensity, frequency of : Pain;in 4 weeks;Comment  Comment:: 50%  Patient will decrease : LEONEL score;for improved quality of function;by _ points  by ___ points: 30%  Pt will: be able to lift and exercise more than she does now (pushups, yoga) without increased pain in 6 weeks:  Pt will: be able to do her gardening without increased back pain in 6 weeks:    Plan / Patient  Education:     Continue with initial plan of care.     Plan for next visit: DE on MEDX, Rotary Torso, Review TA exercise and progress, possible SL lumbar rotation mobilizations, Lumbar paraspinal and glut strengthening.    Subjective:     Pain Ratin/10    Today feels really good, she was a little tight on .     Objective:     Patient is in the 3rd week of the MEDX program.  Review of HEP.  Trial of prone leg extension for increased difficulty of exercises.    Exercises:  Exercise #1: SKTC, Hamstring Stretch with band, lumbar rotation stretch (sustained), piriformis stretch (figure 4), TA Set with marching, Cat/cow  Comment #1: TA SLR  Exercise #2: Jesus pose 3 way  Comment #2: X 30 seconds.  Exercise #3: Rotary Torso 90 seconds 22#'s  Comment #3: Started to the R  Exercise #4: Quadruped Leg Extension  Comment #4: X 10  Exercise #5: TA Deadbug March  Comment #5: X 15  Exercise #6: Kneeling hip flexor stretch  Comment #6: X 30 seconds.  Exercise #7: Bridge on Ball with and without curls  Comment #7: X 10   Exercise #8: Bridge and SL bridge  Comment #8: X 10   Exercise #9: Prone Leg extension  Comment #9: X 10   Exercise #10: Physioball Ab rollout  Comment #10: X 10   Exercise #11: Pilates reformer 90/90 pulldowns  Comment #11: X 10 2 red    Treatment Today     TREATMENT MINUTES COMMENTS   Evaluation     Self-care/ Home management     Manual therapy     Neuromuscular Re-education     Therapeutic Activity     Therapeutic Exercises 28 Review and progression of HEP, MEDX.   Gait training     Modality__________________                Total 28    Blank areas are intentional and mean the treatment did not include these items.       Ming BOLANOS  2017   No cyanosis, clubbing or edema

## 2022-04-27 DIAGNOSIS — N95.2 ATROPHIC VAGINITIS: ICD-10-CM

## 2022-04-27 RX ORDER — ESTRADIOL 0.1 MG/G
CREAM VAGINAL
Qty: 42.5 G | Refills: 11 | Status: SHIPPED | OUTPATIENT
Start: 2022-04-27 | End: 2023-08-09

## 2022-04-27 NOTE — TELEPHONE ENCOUNTER
Medication: estradioL (ESTRACE) 0.01 % (0.1 mg/gram) vaginal cream      CSA in last year: unknown    Random Utox in last year: unknown  (if any of the above answer NO - schedule with PCP)     On opioids in addition to benzodiazepines? unknown  (if the above answer YES - schedule with PCP every 6 months)           PROVIDER TO PULL THE FOLLOWING FROM  :    1. Last date filled?  2.   Only PCP Prescribing?  3.   Taken as prescribed from physician notes?

## 2022-10-01 ENCOUNTER — HEALTH MAINTENANCE LETTER (OUTPATIENT)
Age: 67
End: 2022-10-01

## 2022-10-12 ENCOUNTER — TRANSFERRED RECORDS (OUTPATIENT)
Dept: HEALTH INFORMATION MANAGEMENT | Facility: CLINIC | Age: 67
End: 2022-10-12

## 2022-11-10 ASSESSMENT — ENCOUNTER SYMPTOMS
WEAKNESS: 0
SHORTNESS OF BREATH: 0
BREAST MASS: 0
ABDOMINAL PAIN: 0
CONSTIPATION: 0
DYSURIA: 0
PALPITATIONS: 0
HEADACHES: 0
DIARRHEA: 0
HEMATOCHEZIA: 0
ARTHRALGIAS: 1
FREQUENCY: 0
CHILLS: 0
JOINT SWELLING: 1
NAUSEA: 0
EYE PAIN: 0
SORE THROAT: 0
COUGH: 0
HEARTBURN: 0
FEVER: 0
MYALGIAS: 0
PARESTHESIAS: 0
NERVOUS/ANXIOUS: 0
DIZZINESS: 0
HEMATURIA: 0

## 2022-11-10 ASSESSMENT — ACTIVITIES OF DAILY LIVING (ADL): CURRENT_FUNCTION: NO ASSISTANCE NEEDED

## 2022-11-16 ENCOUNTER — OFFICE VISIT (OUTPATIENT)
Dept: INTERNAL MEDICINE | Facility: CLINIC | Age: 67
End: 2022-11-16
Payer: COMMERCIAL

## 2022-11-16 VITALS
HEIGHT: 67 IN | DIASTOLIC BLOOD PRESSURE: 83 MMHG | HEART RATE: 77 BPM | SYSTOLIC BLOOD PRESSURE: 146 MMHG | BODY MASS INDEX: 27 KG/M2 | RESPIRATION RATE: 15 BRPM | OXYGEN SATURATION: 99 % | WEIGHT: 172 LBS

## 2022-11-16 DIAGNOSIS — E55.9 VITAMIN D DEFICIENCY: ICD-10-CM

## 2022-11-16 DIAGNOSIS — R03.0 ELEVATED BP WITHOUT DIAGNOSIS OF HYPERTENSION: ICD-10-CM

## 2022-11-16 DIAGNOSIS — Z12.31 ENCOUNTER FOR SCREENING MAMMOGRAM FOR BREAST CANCER: ICD-10-CM

## 2022-11-16 DIAGNOSIS — E78.5 HYPERLIPIDEMIA LDL GOAL <100: ICD-10-CM

## 2022-11-16 DIAGNOSIS — Z23 NEED FOR VACCINATION: ICD-10-CM

## 2022-11-16 DIAGNOSIS — L71.9 ROSACEA: ICD-10-CM

## 2022-11-16 DIAGNOSIS — Z00.00 ENCOUNTER FOR MEDICARE ANNUAL WELLNESS EXAM: Primary | ICD-10-CM

## 2022-11-16 LAB
ALBUMIN SERPL BCG-MCNC: 4.2 G/DL (ref 3.5–5.2)
ALP SERPL-CCNC: 72 U/L (ref 35–104)
ALT SERPL W P-5'-P-CCNC: 16 U/L (ref 10–35)
ANION GAP SERPL CALCULATED.3IONS-SCNC: 10 MMOL/L (ref 7–15)
AST SERPL W P-5'-P-CCNC: 21 U/L (ref 10–35)
BASOPHILS # BLD AUTO: 0 10E3/UL (ref 0–0.2)
BASOPHILS NFR BLD AUTO: 1 %
BILIRUB SERPL-MCNC: 0.6 MG/DL
BUN SERPL-MCNC: 16.4 MG/DL (ref 8–23)
CALCIUM SERPL-MCNC: 9.5 MG/DL (ref 8.8–10.2)
CHLORIDE SERPL-SCNC: 102 MMOL/L (ref 98–107)
CHOLEST SERPL-MCNC: 243 MG/DL
CREAT SERPL-MCNC: 0.81 MG/DL (ref 0.51–0.95)
DEPRECATED CALCIDIOL+CALCIFEROL SERPL-MC: 38 UG/L (ref 20–75)
DEPRECATED HCO3 PLAS-SCNC: 26 MMOL/L (ref 22–29)
EOSINOPHIL # BLD AUTO: 0.1 10E3/UL (ref 0–0.7)
EOSINOPHIL NFR BLD AUTO: 2 %
ERYTHROCYTE [DISTWIDTH] IN BLOOD BY AUTOMATED COUNT: 12 % (ref 10–15)
GFR SERPL CREATININE-BSD FRML MDRD: 80 ML/MIN/1.73M2
GLUCOSE SERPL-MCNC: 100 MG/DL (ref 70–99)
HCT VFR BLD AUTO: 40.1 % (ref 35–47)
HDLC SERPL-MCNC: 68 MG/DL
HGB BLD-MCNC: 13.3 G/DL (ref 11.7–15.7)
IMM GRANULOCYTES # BLD: 0 10E3/UL
IMM GRANULOCYTES NFR BLD: 0 %
LDLC SERPL CALC-MCNC: 150 MG/DL
LYMPHOCYTES # BLD AUTO: 1.7 10E3/UL (ref 0.8–5.3)
LYMPHOCYTES NFR BLD AUTO: 38 %
MCH RBC QN AUTO: 30.2 PG (ref 26.5–33)
MCHC RBC AUTO-ENTMCNC: 33.2 G/DL (ref 31.5–36.5)
MCV RBC AUTO: 91 FL (ref 78–100)
MONOCYTES # BLD AUTO: 0.3 10E3/UL (ref 0–1.3)
MONOCYTES NFR BLD AUTO: 7 %
NEUTROPHILS # BLD AUTO: 2.3 10E3/UL (ref 1.6–8.3)
NEUTROPHILS NFR BLD AUTO: 53 %
NONHDLC SERPL-MCNC: 175 MG/DL
PLATELET # BLD AUTO: 143 10E3/UL (ref 150–450)
POTASSIUM SERPL-SCNC: 4.2 MMOL/L (ref 3.4–5.3)
PROT SERPL-MCNC: 6.6 G/DL (ref 6.4–8.3)
RBC # BLD AUTO: 4.4 10E6/UL (ref 3.8–5.2)
SODIUM SERPL-SCNC: 138 MMOL/L (ref 136–145)
TRIGL SERPL-MCNC: 125 MG/DL
TSH SERPL DL<=0.005 MIU/L-ACNC: 0.86 UIU/ML (ref 0.3–4.2)
WBC # BLD AUTO: 4.4 10E3/UL (ref 4–11)

## 2022-11-16 PROCEDURE — G0009 ADMIN PNEUMOCOCCAL VACCINE: HCPCS | Performed by: INTERNAL MEDICINE

## 2022-11-16 PROCEDURE — 90677 PCV20 VACCINE IM: CPT | Performed by: INTERNAL MEDICINE

## 2022-11-16 PROCEDURE — G0438 PPPS, INITIAL VISIT: HCPCS | Performed by: INTERNAL MEDICINE

## 2022-11-16 PROCEDURE — 80053 COMPREHEN METABOLIC PANEL: CPT | Performed by: INTERNAL MEDICINE

## 2022-11-16 PROCEDURE — 85025 COMPLETE CBC W/AUTO DIFF WBC: CPT | Performed by: INTERNAL MEDICINE

## 2022-11-16 PROCEDURE — 99214 OFFICE O/P EST MOD 30 MIN: CPT | Mod: 25 | Performed by: INTERNAL MEDICINE

## 2022-11-16 PROCEDURE — 84443 ASSAY THYROID STIM HORMONE: CPT | Performed by: INTERNAL MEDICINE

## 2022-11-16 PROCEDURE — 82306 VITAMIN D 25 HYDROXY: CPT | Performed by: INTERNAL MEDICINE

## 2022-11-16 PROCEDURE — 36415 COLL VENOUS BLD VENIPUNCTURE: CPT | Performed by: INTERNAL MEDICINE

## 2022-11-16 PROCEDURE — 80061 LIPID PANEL: CPT | Performed by: INTERNAL MEDICINE

## 2022-11-16 RX ORDER — AZELAIC ACID 0.15 G/G
GEL TOPICAL
Qty: 30 G | Refills: 3 | Status: SHIPPED | OUTPATIENT
Start: 2022-11-16

## 2022-11-16 ASSESSMENT — ENCOUNTER SYMPTOMS
SORE THROAT: 0
JOINT SWELLING: 1
MYALGIAS: 0
PARESTHESIAS: 0
HEADACHES: 0
HEMATURIA: 0
NAUSEA: 0
NERVOUS/ANXIOUS: 0
DIZZINESS: 0
HEARTBURN: 0
CHILLS: 0
PALPITATIONS: 0
EYE PAIN: 0
HEMATOCHEZIA: 0
SHORTNESS OF BREATH: 0
ABDOMINAL PAIN: 0
CONSTIPATION: 0
FEVER: 0
BREAST MASS: 0
DYSURIA: 0
WEAKNESS: 0
COUGH: 0
DIARRHEA: 0
ARTHRALGIAS: 1
FREQUENCY: 0

## 2022-11-16 ASSESSMENT — ACTIVITIES OF DAILY LIVING (ADL): CURRENT_FUNCTION: NO ASSISTANCE NEEDED

## 2022-11-16 NOTE — PROGRESS NOTES
SUBJECTIVE:   Tosha is a 67 year old who presents for Preventive Visit.  Patient has been advised of split billing requirements and indicates understanding: Yes  Are you in the first 12 months of your Medicare coverage?  No    Tosha is a 67 y.o. female who is an avid , with hyperlipidemia , atrophic vaginitis, colon polyps, chronic thrombocytopenia, facet arthropathy, number skin tags who is currently here for  a physical.    Is been under the weather recently.  Her and her  sold [place] house in [place]  It is quite a change from her usual environment and she is really missing her garden.  She did some community gardening this summer but it was not as a good.  Overall she reports that her mood is improving and PHQ 2 screen done 2 weeks ago was positive but currently is negative.  She is looking forward to their annual trip to [place].  She has been sleeping well and has increased her exercise and mild which also helped boost her mood.    Blood pressure in the office is always above goal.  She has been monitoring it at home and usually it is in 120s.  In the last 2 days it was 137 and 135 systolically but she has been eating a little bit more salt and drinking slightly more alcohol.  She denies any NSAID use, her weight is very stable.    She does go to [facility] for yearly eye check and wears glasses.    No problems with hearing.    She did fall once in her new house call and accidentally slipped on the steps while wearing socks, no significant injury.      She does snore but denies any fatigue during the day or apnea when she is sleeping.  She does drink 4 cups of coffee a day but plans to decrease it to 2.    Healthy Habits:     In general, how would you rate your overall health?  Good    Frequency of exercise:  4-5 days/week    Duration of exercise:  45-60 minutes    Do you usually eat at least 4 servings of fruit and vegetables a day, include whole grains    &  "fiber and avoid regularly eating high fat or \"junk\" foods?  Yes    Taking medications regularly:  Yes    Medication side effects:  Not applicable    Ability to successfully perform activities of daily living:  No assistance needed    Home Safety:  Throw rugs in the hallway and lack of grab bars in the bathroom    Hearing Impairment:  No hearing concerns    In the past 6 months, have you been bothered by leaking of urine?  No    In general, how would you rate your overall mental or emotional health?  Good      PHQ-2 Total Score: 0    Additional concerns today:  No    Cognitive Screening   1) Repeat 3 items (Leader, Season, Table)    2) Clock draw: NORMAL  3) 3 item recall: Recalls 3 objects  Results: 3 items recalled: COGNITIVE IMPAIRMENT LESS LIKELY    Mini-CogTM Copyright S Peg. Licensed by the author for use in [facility]; reprinted with permission (hakan@[facility]). All rights reserved.      Reviewed and updated as needed this visit by clinical staff   Tobacco  Allergies  Meds              Reviewed and updated as needed this visit by Provider                 Social History     Tobacco Use     Smoking status: Never     Smokeless tobacco: Never   Substance Use Topics     Alcohol use: Yes     Alcohol/week: 6.0 standard drinks     Comment: Alcoholic Drinks/day: moderately       Alcohol Use 11/10/2022   Prescreen: >3 drinks/day or >7 drinks/week? No   Patient Care Team:  Marya Hartley MD as PCP - General (Internal Medicine)  Marya Hartley MD as Assigned PCP    The following health maintenance items are reviewed in Epic and correct as of today:  Health Maintenance   Topic Date Due     Pneumococcal Vaccine: 65+ Years (2 - PCV) 11/05/2021     ANNUAL REVIEW OF HM ORDERS  11/12/2022     MEDICARE ANNUAL WELLNESS VISIT  11/12/2022     DTAP/TDAP/TD IMMUNIZATION (2 - Td or Tdap) 04/02/2023     FALL RISK ASSESSMENT  11/16/2023     MAMMO SCREENING  11/29/2023     LIPID  11/12/2026     ADVANCE CARE " "PLANNING  11/12/2026     COLORECTAL CANCER SCREENING  10/12/2032     DEXA  11/29/2036     HEPATITIS C SCREENING  Completed     PHQ-2 (once per calendar year)  Completed     INFLUENZA VACCINE  Completed     ZOSTER IMMUNIZATION  Completed     COVID-19 Vaccine  Completed     IPV IMMUNIZATION  Aged Out     MENINGITIS IMMUNIZATION  Aged Out     PAP  Discontinued       Breast CA Risk Assessment (FHS-7) 11/9/2021 11/29/2021   Do you have a family history of breast, colon, or ovarian cancer? Yes No / Unknown     Pertinent mammograms are reviewed under the imaging tab.    Review of Systems   Constitutional: Negative for chills and fever.   HENT: Negative for congestion, ear pain, hearing loss and sore throat.    Eyes: Negative for pain and visual disturbance.   Respiratory: Negative for cough and shortness of breath.    Cardiovascular: Negative for chest pain, palpitations and peripheral edema.   Gastrointestinal: Negative for abdominal pain, constipation, diarrhea, heartburn, hematochezia and nausea.   Breasts:  Negative for tenderness, breast mass and discharge.   Genitourinary: Negative for dysuria, frequency, genital sores, hematuria, pelvic pain, urgency, vaginal bleeding and vaginal discharge.   Musculoskeletal: Positive for arthralgias and joint swelling. Negative for myalgias.   Skin: Negative for rash.   Neurological: Negative for dizziness, weakness, headaches and paresthesias.   Psychiatric/Behavioral: Positive for mood changes. The patient is not nervous/anxious.        OBJECTIVE:   BP (!) 146/83 (BP Location: Left arm, Patient Position: Sitting, Cuff Size: Adult Regular)   Pulse 77   Resp 15   Ht 1.702 m (5' 7.01\")   Wt 78 kg (172 lb)   SpO2 99%   BMI 26.93 kg/m   Estimated body mass index is 26.94 kg/m  as calculated from the following:    Height as of 11/5/20: 1.702 m (5' 7\").    Weight as of 11/12/21: 78 kg (172 lb).  Physical Exam  General: well appearing female, alert and oriented x3  EYES: Eyelids, " conjunctiva, and sclera were normal. Pupils were normal.   HEAD, EARS, NOSE, MOUTH, AND THROAT: no cervical LAD, no thyromegaly or nodules appreciated. TMs are visualized and normal, oropharynx is clear, crowded.  RESPIRATORY: respirations non labored, CTA bl, no wheezes, rales, no forced expiratory wheezing.  CARDIOVASCULAR: Heart rate and rhythm were normal. No murmurs, rubs,gallops. There was no peripheral edema. No carotid bruits.  GASTROINTESTINAL: Positive bowel sounds, abdomen is soft, non tender, non distended.     MUSCULOSKELETAL: Muscle mass was normal for age. No joint synovitis or deformity.  LYMPHATIC: There were no enlarged nodes palpable.  SKIN/HAIR/NAILS: Skin color was normal.  No rashes.  Numerous seborrheic keratosis noted.  NEUROLOGIC: The patient was alert and oriented.  Speech was normal.  There is no facial asymmetry.   PSYCHIATRIC:  Mood and affect were normal.   Breast exam: No axillary lymphadenopathy, breast masses or skin changes appreciated.      ASSESSMENT / PLAN:   Tosha was seen today for wellness visit.    Diagnoses and all orders for this visit:    Encounter for Medicare annual wellness exam  Discussed preventative care, since her cholesterol was excellent last year and no need to repeat it today.  We will check other labs.  Mammogram and pneumococcal vaccine will be ordered.  -     CBC with platelets and differential  -     Pneumococcal 20 Valent Conjugate (PCV20)  -     MA Screen Bilateral w/[place]; Future    Rosacea  Refill provided  -     azelaic acid (FINACIA) 15 % external gel; Apply small amount to face daily    Elevated BP without diagnosis of hypertension  At home blood pressures have been in 120s consistently except for the last 2 days when it was in 130s.  Currently she has been drinking alcohol every day and slightly increase her salt.  She will go back to low intake of both of those substances and plans to decrease amount of coffee that she drinks.  She will continue  monitoring at home.  She does snore but denies fatigue during the day.  -     Comprehensive metabolic panel  -     Lipid panel reflex to direct LDL Fasting  -     TSH with free T4 reflex    Vitamin D deficiency  -     Vitamin D Deficiency    Hyperlipidemia LDL goal <100  -     Comprehensive metabolic panel  -     Lipid panel reflex to direct LDL Fasting  -     TSH with free T4 reflex          She reports that she has never smoked. She has never used smokeless tobacco.      Appropriate preventive services were discussed with this patient, including applicable screening as appropriate for cardiovascular disease, diabetes, osteopenia/osteoporosis, and glaucoma.  As appropriate for age/gender, discussed screening for colorectal cancer, prostate cancer, breast cancer, and cervical cancer. Checklist reviewing preventive services available has been given to the patient.    Reviewed patients plan of care and provided an AVS. The Basic Care Plan (routine screening as documented in Health Maintenance) for Tosha meets the Care Plan requirement. This Care Plan has been established and reviewed with the Patient.      Marya Hartley MD  [facility]    Identified Health Risks:

## 2022-11-16 NOTE — PATIENT INSTRUCTIONS
Goal b/p < 130/80, cut back on alcohol, salt, coffee    2. Labs today    3. Mammogram     4. Pneumonia shot today and one last one in 2025.    5. Continue monitoring  of b/p at home.     Patient Education   Personalized Prevention Plan  You are due for the preventive services outlined below.  Your care team is available to assist you in scheduling these services.  If you have already completed any of these items, please share that information with your care team to update in your medical record.  Health Maintenance Due   Topic Date Due    Pneumococcal Vaccine (2 - PCV) 11/05/2021    ANNUAL REVIEW OF HM ORDERS  11/12/2022

## 2022-12-21 ENCOUNTER — ANCILLARY PROCEDURE (OUTPATIENT)
Dept: MAMMOGRAPHY | Facility: CLINIC | Age: 67
End: 2022-12-21
Attending: INTERNAL MEDICINE
Payer: COMMERCIAL

## 2022-12-21 DIAGNOSIS — Z12.31 ENCOUNTER FOR SCREENING MAMMOGRAM FOR BREAST CANCER: ICD-10-CM

## 2022-12-21 PROCEDURE — 77063 BREAST TOMOSYNTHESIS BI: CPT | Mod: TC | Performed by: RADIOLOGY

## 2022-12-21 PROCEDURE — 77067 SCR MAMMO BI INCL CAD: CPT | Mod: TC | Performed by: RADIOLOGY

## 2022-12-28 ENCOUNTER — OFFICE VISIT (OUTPATIENT)
Dept: INTERNAL MEDICINE | Facility: CLINIC | Age: 67
End: 2022-12-28
Payer: COMMERCIAL

## 2022-12-28 VITALS
SYSTOLIC BLOOD PRESSURE: 139 MMHG | RESPIRATION RATE: 16 BRPM | TEMPERATURE: 97.7 F | HEIGHT: 67 IN | OXYGEN SATURATION: 100 % | WEIGHT: 172 LBS | HEART RATE: 87 BPM | DIASTOLIC BLOOD PRESSURE: 78 MMHG | BODY MASS INDEX: 27 KG/M2

## 2022-12-28 DIAGNOSIS — B35.4 TINEA CORPORIS: Primary | ICD-10-CM

## 2022-12-28 PROBLEM — M79.604 PAIN OF RIGHT LOWER EXTREMITY: Status: RESOLVED | Noted: 2019-10-07 | Resolved: 2022-12-28

## 2022-12-28 PROCEDURE — 99213 OFFICE O/P EST LOW 20 MIN: CPT | Performed by: INTERNAL MEDICINE

## 2022-12-28 RX ORDER — PRENATAL VIT 91/IRON/FOLIC/DHA 28-975-200
COMBINATION PACKAGE (EA) ORAL 2 TIMES DAILY
Qty: 15 G | Refills: 1 | Status: SHIPPED | OUTPATIENT
Start: 2022-12-28

## 2022-12-28 NOTE — PROGRESS NOTES
" ASSESSMENT AND PLAN:    1. Tinea corporis  Exam is most consistent with mild, localized tinea corporis. Will treat topically.  She uses a mild soap.  Follow up if fails to improve.   - terbinafine (LAMISIL) 1 % external cream; Apply topically 2 times daily  Dispense: 15 g; Refill: 1    CHIEF COMPLAINT:  Rash    HISTORY OF PRESENT ILLNESS:  Tosha Rosales is a 67 year old female with rash the past month or so, right upper inner thigh. Not very itchy and no pain. Otherwise feels well.  History of 'ringworm' in the past.      Past Medical History:   Diagnosis Date     Osteoarthritis      Osteoarthritis      Polyarthralgia      Polyarthralgia      History   Smoking Status     Never   Smokeless Tobacco     Never     Family History   Problem Relation Age of Onset     Dementia Mother      Depression Mother      Dementia Father      Cerebrovascular Disease Father      Brain Cancer Sister         astrocytoma     Cancer Sister         brain cancer: glioblastoma     No Known Problems Brother      No Known Problems Sister      No Known Problems Brother      No Known Problems Brother      No Known Problems Daughter      Breast Cancer Maternal Grandmother 85.00     Past Surgical History:   Procedure Laterality Date     BIOPSY BREAST Left 2004     DILATION AND CURETTAGE       LAPAROSCOPY DIAGNOSTIC (GENERAL)      infertility     VITALS:  Vitals:    12/28/22 0841   BP: 139/78   BP Location: Left arm   Patient Position: Sitting   Cuff Size: Adult Regular   Pulse: 87   Resp: 16   Temp: 97.7  F (36.5  C)   TempSrc: Tympanic   SpO2: 100%   Weight: 78 kg (172 lb)   Height: 1.702 m (5' 7\")     Wt Readings from Last 3 Encounters:   12/28/22 78 kg (172 lb)   11/16/22 78 kg (172 lb)   11/12/21 78 kg (172 lb)     PHYSICAL EXAM:  Constitutional:  In NAD, alert and oriented  Skin:  A serpingeous area of slightly raised dermatosis, slightly scaly.     Current Outpatient Medications   Medication Sig Dispense Refill     azelaic acid (FINACIA) 15 " % external gel Apply small amount to face daily 30 g 3     calcium, as carbonate, (TUMS) 200 mg calcium (500 mg) chewable tablet [CALCIUM, AS CARBONATE, (TUMS) 200 MG CALCIUM (500 MG) CHEWABLE TABLET] Chew 1 tablet daily.       cholecalciferol, vitamin D3, 1,000 unit capsule Take 2 capsules by mouth daily       estradiol (ESTRACE) 0.1 MG/GM vaginal cream [ESTRADIOL (ESTRACE) 0.01 % (0.1 MG/GRAM) VAGINAL CREAM] Insert 0.5 gm vaginally twice  weekly 42.5 g 11     multivitamin (ONE A DAY) per tablet [MULTIVITAMIN (ONE A DAY) PER TABLET] Take 1 tablet by mouth.       terbinafine (LAMISIL) 1 % external cream Apply topically 2 times daily 15 g 1     Zach Redman MD  Internal Medicine  [facility]

## 2023-08-09 DIAGNOSIS — N95.2 ATROPHIC VAGINITIS: ICD-10-CM

## 2023-08-09 RX ORDER — ESTRADIOL 0.1 MG/G
CREAM VAGINAL
Qty: 42.5 G | Refills: 3 | Status: SHIPPED | OUTPATIENT
Start: 2023-08-09

## 2023-08-09 NOTE — TELEPHONE ENCOUNTER
"Last Written Prescription Date:  4/27/22  Last Fill Quantity: 42.5 g,  # refills: 11   Last office visit provider:  12/28/22     Requested Prescriptions   Pending Prescriptions Disp Refills    estradiol (ESTRACE) 0.1 MG/GM vaginal cream [Pharmacy Med Name: ESTRADIOL 0.01% CREAM] 42.5 g 11     Sig: INSERT 0.5 GM VAGINALLY TWICE WEEKLY       Hormone Replacement Therapy Passed - 8/9/2023 11:07 AM        Passed - Blood pressure under 140/90 in past 12 months     BP Readings from Last 3 Encounters:   12/28/22 139/78   11/16/22 (!) 146/83   11/12/21 (!) 144/76                 Passed - Recent (12 mo) or future (30 days) visit within the authorizing provider's specialty     Patient has had an office visit with the authorizing provider or a provider within the authorizing providers department within the previous 12 mos or has a future within next 30 days. See \"Patient Info\" tab in inbasket, or \"Choose Columns\" in Meds & Orders section of the refill encounter.              Passed - Patient has mammogram in past 2 years on file if age 50-75        Passed - Medication is active on med list        Passed - Patient is 18 years of age or older        Passed - No active pregnancy on record        Passed - No positive pregnancy test on record in past 12 months             Teresita Pringle 08/09/23 3:24 PM  "

## 2023-08-14 ENCOUNTER — TRANSFERRED RECORDS (OUTPATIENT)
Dept: HEALTH INFORMATION MANAGEMENT | Facility: CLINIC | Age: 68
End: 2023-08-14
Payer: COMMERCIAL

## 2023-08-18 ENCOUNTER — TRANSFERRED RECORDS (OUTPATIENT)
Dept: HEALTH INFORMATION MANAGEMENT | Facility: CLINIC | Age: 68
End: 2023-08-18
Payer: COMMERCIAL

## 2023-09-28 ENCOUNTER — OFFICE VISIT (OUTPATIENT)
Dept: PODIATRY | Facility: CLINIC | Age: 68
End: 2023-09-28
Payer: COMMERCIAL

## 2023-09-28 ENCOUNTER — ANCILLARY PROCEDURE (OUTPATIENT)
Dept: GENERAL RADIOLOGY | Facility: CLINIC | Age: 68
End: 2023-09-28
Attending: PODIATRIST
Payer: COMMERCIAL

## 2023-09-28 VITALS
WEIGHT: 172 LBS | DIASTOLIC BLOOD PRESSURE: 78 MMHG | HEIGHT: 67 IN | SYSTOLIC BLOOD PRESSURE: 124 MMHG | BODY MASS INDEX: 27 KG/M2

## 2023-09-28 DIAGNOSIS — M79.671 RIGHT FOOT PAIN: Primary | ICD-10-CM

## 2023-09-28 DIAGNOSIS — M19.071 ARTHRITIS OF RIGHT FOOT: ICD-10-CM

## 2023-09-28 DIAGNOSIS — M79.671 RIGHT FOOT PAIN: ICD-10-CM

## 2023-09-28 PROCEDURE — 99203 OFFICE O/P NEW LOW 30 MIN: CPT | Performed by: PODIATRIST

## 2023-09-28 PROCEDURE — 73630 X-RAY EXAM OF FOOT: CPT | Mod: TC | Performed by: RADIOLOGY

## 2023-09-28 NOTE — LETTER
9/28/2023         RE: Tosha Rosales  [address] [facility] [address]        Dear Colleague,    Thank you for referring your patient, Tosha Rosales, to the [facility] POD[facility]. Please see a copy of my visit note below.    PATIENT HISTORY:   Tosha Rosales is a 68 year old female who presents to clinic for pain to the top of the right foot.  Has been going on for 4 weeks notes it is an aching pain.  Can be 7 out of 10 at its worst.  Usually happens a few times throughout the day.  Worse with increased activity or pressing on the brake for car.  She is tried icing ibuprofen and elevation but it has not really helped.  Denies specific injury.  Wondering what is causing the pain and what can be done for it.    Review of Systems:  Patient denies fever, chills, rash, wound,  numbness, weakness, heart burn, blood in stool, chest pain with activity, calf pain when walking, shortness of breath with activity, chronic cough, easy bleeding/bruising, swelling of ankles, excessive thirst, fatigue, depression, anxiety.  Patient admits to limping at times, stiffness.     PAST MEDICAL HISTORY:   Past Medical History:   Diagnosis Date     Osteoarthritis      Osteoarthritis      Polyarthralgia      Polyarthralgia         PAST SURGICAL HISTORY:   Past Surgical History:   Procedure Laterality Date     BIOPSY BREAST Left 2004     DILATION AND CURETTAGE       LAPAROSCOPY DIAGNOSTIC (GENERAL)      infertility        MEDICATIONS:   Current Outpatient Medications:      azelaic acid (FINACIA) 15 % external gel, Apply small amount to face daily, Disp: 30 g, Rfl: 3     calcium, as carbonate, (TUMS) 200 mg calcium (500 mg) chewable tablet, [CALCIUM, AS CARBONATE, (TUMS) 200 MG CALCIUM (500 MG) CHEWABLE TABLET] Chew 1 tablet daily., Disp: , Rfl:      cholecalciferol, vitamin D3, 1,000 unit capsule, Take 2 capsules by mouth daily, Disp: , Rfl:      estradiol (ESTRACE) 0.1 MG/GM vaginal cream, INSERT 0.5 GM  VAGINALLY TWICE WEEKLY, Disp: 42.5 g, Rfl: 3     multivitamin (ONE A DAY) per tablet, [MULTIVITAMIN (ONE A DAY) PER TABLET] Take 1 tablet by mouth., Disp: , Rfl:      terbinafine (LAMISIL) 1 % external cream, Apply topically 2 times daily, Disp: 15 g, Rfl: 1     ALLERGIES:    Allergies   Allergen Reactions     Codeine Unknown        SOCIAL HISTORY:   Social History     Socioeconomic History     Marital status:      Spouse name: Not on file     Number of children: Not on file     Years of education: Not on file     Highest education level: Not on file   Occupational History     Not on file   Tobacco Use     Smoking status: Never     Smokeless tobacco: Never   Substance and Sexual Activity     Alcohol use: Yes     Alcohol/week: 6.0 standard drinks of alcohol     Comment: Alcoholic Drinks/day: moderately     Drug use: No     Comment: Drug use: rarely      Sexual activity: Yes     Partners: Male   Other Topics Concern     Not on file   Social History Narrative    Lives with her , Srini.  Works for [employer].  Two daughters (Beti) and Maegan (adopted, [place]).       Social Determinants of Health     Financial Resource Strain: Low Risk  (11/9/2021)    Overall Financial Resource Strain (CARDIA)      Difficulty of Paying Living Expenses: Not hard at all   Food Insecurity: No Food Insecurity (11/9/2021)    Hunger Vital Sign      Worried About Running Out of Food in the Last Year: Never true      Ran Out of Food in the Last Year: Never true   Transportation Needs: No Transportation Needs (11/9/2021)    PRAPARE - Transportation      Lack of Transportation (Medical): No      Lack of Transportation (Non-Medical): No   Physical Activity: Sufficiently Active (11/9/2021)    Exercise Vital Sign      Days of Exercise per Week: 5 days      Minutes of Exercise per Session: 50 min   Stress: No Stress Concern Present (11/9/2021)    [employer] [place] [employer] - Occupational Stress  "Questionnaire      Feeling of Stress : Only a little   Social Connections: Socially Integrated (11/9/2021)    Social Connection and Isolation Panel [NHANES]      Frequency of Communication with Friends and Family: More than three times a week      Frequency of Social Gatherings with Friends and Family: Twice a week      Attends Christian Services: More than 4 times per year      Active Member of Clubs or Organizations: Yes      Attends Club or Organization Meetings: Not on file      Marital Status:    Interpersonal Safety: Not on file   Housing Stability: Low Risk  (11/9/2021)    Housing Stability Vital Sign      Unable to Pay for Housing in the Last Year: No      Number of Places Lived in the Last Year: 1      Unstable Housing in the Last Year: No        FAMILY HISTORY:   Family History   Problem Relation Age of Onset     Dementia Mother      Depression Mother      Dementia Father      Cerebrovascular Disease Father      Brain Cancer Sister         astrocytoma     Cancer Sister         brain cancer: glioblastoma     No Known Problems Brother      No Known Problems Sister      No Known Problems Brother      No Known Problems Brother      No Known Problems Daughter      Breast Cancer Maternal Grandmother 85.00        EXAM:Vitals: Ht 1.702 m (5' 7\")   Wt 78 kg (172 lb)   BMI 26.94 kg/m    BMI= Body mass index is 26.94 kg/m .      General appearance: Patient is alert and fully cooperative with history & exam.  No sign of distress is noted during the visit.     Psychiatric: Affect is pleasant & appropriate.  Patient appears motivated to improve health.     Respiratory: Breathing is regular & unlabored while sitting.     HEENT: Hearing is intact to spoken word.  Speech is clear.  No gross evidence of visual impairment that would impact ambulation.     Dermatologic: Skin is intact to both lower extremities without significant lesions, rash or abrasion.  No paronychia or evidence of soft tissue infection is " noted.     Vascular: DP & PT pulses are intact & regular bilaterally.  No significant edema or varicosities noted.  CFT and skin temperature is normal to both lower extremities.     Neurologic: Lower extremity sensation is intact to light touch.  No evidence of weakness or contracture in the lower extremities.  No evidence of neuropathy.     Musculoskeletal: Patient is ambulatory without assistive device or brace.  Pain with midfoot range of motion of the right foot and on palpation of the second met cuneiform base.    Radiographs: Right foot x-ray - I personally reviewed the xrays -minimal degenerative changes to the midfoot.  No fractures are noted.     ASSESSMENT:    Right foot pain  Arthritis of right foot     Medical Decision Making/Plan:  Review[facility] in [facility].  Reviewed and discussed x-rays. Talked about arthritis and treatments including orthotics, injections, icing, NSAIDS, bracing, physical therapy, compounding pain cream, or surgical intervention.    At this time she would like to try topical pain cream.  This was ordered.  Recommend heat instead of icing.  Recommend inserts in the shoes and not going barefoot or in socks.    All questions were answered to patient's satisfaction and they will call further questions or concerns.    Patient risk factor: Patient is at low risk for infection.        Marivel Palacios DPM, Podiatry/Foot and Ankle Surgery      Again, thank you for allowing me to participate in the care of your patient.        Sincerely,        Marivel Palacios DPM, Podiatry/Foot and Ankle Surgery

## 2023-09-28 NOTE — PROGRESS NOTES
PATIENT HISTORY:   Tosha Rosales is a 68 year old female who presents to clinic for pain to the top of the right foot.  Has been going on for 4 weeks notes it is an aching pain.  Can be 7 out of 10 at its worst.  Usually happens a few times throughout the day.  Worse with increased activity or pressing on the brake for car.  She is tried icing ibuprofen and elevation but it has not really helped.  Denies specific injury.  Wondering what is causing the pain and what can be done for it.    Review of Systems:  Patient denies fever, chills, rash, wound,  numbness, weakness, heart burn, blood in stool, chest pain with activity, calf pain when walking, shortness of breath with activity, chronic cough, easy bleeding/bruising, swelling of ankles, excessive thirst, fatigue, depression, anxiety.  Patient admits to limping at times, stiffness.     PAST MEDICAL HISTORY:   Past Medical History:   Diagnosis Date    Osteoarthritis     Osteoarthritis     Polyarthralgia     Polyarthralgia         PAST SURGICAL HISTORY:   Past Surgical History:   Procedure Laterality Date    BIOPSY BREAST Left 2004    DILATION AND CURETTAGE      LAPAROSCOPY DIAGNOSTIC (GENERAL)      infertility        MEDICATIONS:   Current Outpatient Medications:     azelaic acid (FINACIA) 15 % external gel, Apply small amount to face daily, Disp: 30 g, Rfl: 3    calcium, as carbonate, (TUMS) 200 mg calcium (500 mg) chewable tablet, [CALCIUM, AS CARBONATE, (TUMS) 200 MG CALCIUM (500 MG) CHEWABLE TABLET] Chew 1 tablet daily., Disp: , Rfl:     cholecalciferol, vitamin D3, 1,000 unit capsule, Take 2 capsules by mouth daily, Disp: , Rfl:     estradiol (ESTRACE) 0.1 MG/GM vaginal cream, INSERT 0.5 GM VAGINALLY TWICE WEEKLY, Disp: 42.5 g, Rfl: 3    multivitamin (ONE A DAY) per tablet, [MULTIVITAMIN (ONE A DAY) PER TABLET] Take 1 tablet by mouth., Disp: , Rfl:     terbinafine (LAMISIL) 1 % external cream, Apply topically 2 times daily, Disp: 15 g, Rfl: 1     ALLERGIES:     Allergies   Allergen Reactions    Codeine Unknown        SOCIAL HISTORY:   Social History     Socioeconomic History    Marital status:      Spouse name: Not on file    Number of children: Not on file    Years of education: Not on file    Highest education level: Not on file   Occupational History    Not on file   Tobacco Use    Smoking status: Never    Smokeless tobacco: Never   Substance and Sexual Activity    Alcohol use: Yes     Alcohol/week: 6.0 standard drinks of alcohol     Comment: Alcoholic Drinks/day: moderately    Drug use: No     Comment: Drug use: rarely     Sexual activity: Yes     Partners: Male   Other Topics Concern    Not on file   Social History Narrative    Lives with her , Srini.  Works for [employer].  Two daughters (Beti) and Maegan (adopted, [place]).       Social Determinants of Health     Financial Resource Strain: Low Risk  (11/9/2021)    Overall Financial Resource Strain (CARDIA)     Difficulty of Paying Living Expenses: Not hard at all   Food Insecurity: No Food Insecurity (11/9/2021)    Hunger Vital Sign     Worried About Running Out of Food in the Last Year: Never true     Ran Out of Food in the Last Year: Never true   Transportation Needs: No Transportation Needs (11/9/2021)    PRAPARE - Transportation     Lack of Transportation (Medical): No     Lack of Transportation (Non-Medical): No   Physical Activity: Sufficiently Active (11/9/2021)    Exercise Vital Sign     Days of Exercise per Week: 5 days     Minutes of Exercise per Session: 50 min   Stress: No Stress Concern Present (11/9/2021)    [employer] [place] [employer] Occupational Health - Occupational Stress Questionnaire     Feeling of Stress : Only a little   Social Connections: Socially Integrated (11/9/2021)    Social Connection and Isolation Panel [NHANES]     Frequency of Communication with Friends and Family: More than three times a week     Frequency of Social Gatherings with Friends and Family:  "Twice a week     Attends Sabianist Services: More than 4 times per year     Active Member of Clubs or Organizations: Yes     Attends Club or Organization Meetings: Not on file     Marital Status:    Interpersonal Safety: Not on file   Housing Stability: Low Risk  (11/9/2021)    Housing Stability Vital Sign     Unable to Pay for Housing in the Last Year: No     Number of Places Lived in the Last Year: 1     Unstable Housing in the Last Year: No        FAMILY HISTORY:   Family History   Problem Relation Age of Onset    Dementia Mother     Depression Mother     Dementia Father     Cerebrovascular Disease Father     Brain Cancer Sister         astrocytoma    Cancer Sister         brain cancer: glioblastoma    No Known Problems Brother     No Known Problems Sister     No Known Problems Brother     No Known Problems Brother     No Known Problems Daughter     Breast Cancer Maternal Grandmother 85.00        EXAM:Vitals: Ht 1.702 m (5' 7\")   Wt 78 kg (172 lb)   BMI 26.94 kg/m    BMI= Body mass index is 26.94 kg/m .      General appearance: Patient is alert and fully cooperative with history & exam.  No sign of distress is noted during the visit.     Psychiatric: Affect is pleasant & appropriate.  Patient appears motivated to improve health.     Respiratory: Breathing is regular & unlabored while sitting.     HEENT: Hearing is intact to spoken word.  Speech is clear.  No gross evidence of visual impairment that would impact ambulation.     Dermatologic: Skin is intact to both lower extremities without significant lesions, rash or abrasion.  No paronychia or evidence of soft tissue infection is noted.     Vascular: DP & PT pulses are intact & regular bilaterally.  No significant edema or varicosities noted.  CFT and skin temperature is normal to both lower extremities.     Neurologic: Lower extremity sensation is intact to light touch.  No evidence of weakness or contracture in the lower extremities.  No evidence of " neuropathy.     Musculoskeletal: Patient is ambulatory without assistive device or brace.  Pain with midfoot range of motion of the right foot and on palpation of the second met cuneiform base.    Radiographs: Right foot x-ray - I personally reviewed the xrays -minimal degenerative changes to the midfoot.  No fractures are noted.     ASSESSMENT:    Right foot pain  Arthritis of right foot     Medical Decision Making/Plan:  [facility]  Reviewed and discussed x-rays. Talked about arthritis and treatments including orthotics, injections, icing, NSAIDS, bracing, physical therapy, compounding pain cream, or surgical intervention.    At this time she would like to try topical pain cream.  This was ordered.  Recommend heat instead of icing.  Recommend inserts in the shoes and not going barefoot or in socks.    All questions were answered to patient's satisfaction and they will call further questions or concerns.    Patient risk factor: Patient is at low risk for infection.        Marivel Palacios DPM, Podiatry/Foot and Ankle Surgery

## 2023-09-28 NOTE — PATIENT INSTRUCTIONS
Thank you for choosing [facility] Podiatry / Foot & Ankle Surgery!    [facility] CLIN[facility]:  [facility]   [address]   [place] [address]      TRIAGE LINE: [phone]  APPOINTMENTS: [phone]  RADIOLOGY: [phone]  SET UP SURGERY: [phone]  PHYSICAL THERAPY: [phone]   FAX NUMBER: [phone]  BILLING QUESTIONS: [phone]       Follow up: As needed      Osteoarthritis of the Foot and Ankle  What Is Osteoarthritis?  Osteoarthritis is a condition characterized by the breakdown and eventual loss of cartilage in one or more joints. Cartilage (the connective tissue found at the end of the bones in the joints) protects and cushions the bones during movement. When cartilage deteriorates or is lost, symptoms develop that can restrict one s ability to easily perform daily activities.  Osteoarthritis is also known as degenerative arthritis, reflecting its nature to develop as part of the aging process. As the most common form of arthritis, osteoarthritis affects millions of Americans. Some people refer to osteoarthritis simply as arthritis, even though there are many different types of arthritis.  Osteoarthritis appears at various joints throughout the body, including the hands, feet, spine, hips, and knees. In the foot, the disease most frequently occurs in the big toe, although it is also often found in the midfoot and ankle.  Causes  Osteoarthritis is considered a  wear and tear  disease because the cartilage in the joint wears down with repeated stress and use over time. As the cartilage deteriorates and gets thinner, the bones lose their protective covering and eventually may rub together, causing pain and inflammation of the joint.  An injury may also lead to osteoarthritis, although it may take months or years after the injury for the condition to develop. For example, osteoarthritis in the big toe is often caused by kicking or jamming the toe, or by dropping  something on the toe. Osteoarthritis in the midfoot is often caused by dropping something on it, or by a sprain or fracture. In the ankle, osteoarthritis is usually caused by a fracture and occasionally by a severe sprain.  Sometimes osteoarthritis develops as a result of abnormal foot mechanics such as flat feet or high arches. A flat foot causes less stability in the ligaments (bands of tissue that connect bones), resulting in excessive strain on the joints, which can cause arthritis. A high arch is rigid and lacks mobility, causing a jamming of joints that creates an increased risk of arthritis.  Symptoms  People with osteoarthritis in the foot or ankle experience, in varying degrees, one or more of the following:  Pain and stiffness in the joint   Swelling in or near the joint   Difficulty walking or bending the joint   Some patients with osteoarthritis also develop a bone spur (a bony protrusion) at the affected joint. Shoe pressure may cause pain at the site of a bone spur, and in some cases blisters or calluses may form over its surface. Bone spurs can also limit the movement of the joint.  Diagnosis  In diagnosing osteoarthritis, the foot and ankle surgeon will examine the foot thoroughly, looking for swelling in the joint, limited mobility, and pain with movement. In some cases, deformity and/or enlargement (spur) of the joint may be noted. X-rays may be ordered to evaluate the extent of the disease.  Non-surgical Treatment  To help relieve symptoms, the surgeon may begin treating osteoarthritis with one or more of the following non-surgical approaches:  Oral medications. Nonsteroidal anti-inflammatory drugs (NSAIDs), such as ibuprofen, are often helpful in reducing the inflammation and pain. Occasionally a prescription for a steroid medication is needed to adequately reduce symptoms.   Orthotic devices. Custom orthotic devices (shoe inserts) are often prescribed to provide support to improve the foot s  mechanics or cushioning to help minimize pain.   Bracing. Bracing, which restricts motion and supports the joint, can reduce pain during walking and help prevent further deformity.   Immobilization. Protecting the foot from movement by wearing a cast or removable cast-boot may be necessary to allow the inflammation to resolve.   Steroid injections. In some cases, steroid injections are applied to the affected joint to deliver anti-inflammatory medication.   Physical therapy. Exercises to strengthen the muscles, especially when the osteoarthritis occurs in the ankle, may give the patient greater stability and help avoid injury that might worsen the condition.   When Is Surgery Needed?  When osteoarthritis has progressed substantially or failed to improve with non-surgical treatment, surgery may be recommended. In advanced cases, surgery may be the only option. The goal of surgery is to decrease pain and improve function. The foot and ankle surgeon will consider a number of factors when selecting the procedure best suited to the patient s condition and lifestyle.

## 2023-11-10 ASSESSMENT — ENCOUNTER SYMPTOMS
BREAST MASS: 0
CONSTIPATION: 0
DIZZINESS: 0
EYE PAIN: 0
PARESTHESIAS: 0
COUGH: 0
DYSURIA: 0
HEMATURIA: 0
HEMATOCHEZIA: 0
FREQUENCY: 0
PALPITATIONS: 0
HEARTBURN: 0
CHILLS: 0
JOINT SWELLING: 1
WEAKNESS: 0
NAUSEA: 0
SHORTNESS OF BREATH: 0
HEADACHES: 0
SORE THROAT: 0
ABDOMINAL PAIN: 0
DIARRHEA: 0
MYALGIAS: 0
FEVER: 0
ARTHRALGIAS: 1
NERVOUS/ANXIOUS: 0

## 2023-11-10 ASSESSMENT — ACTIVITIES OF DAILY LIVING (ADL): CURRENT_FUNCTION: NO ASSISTANCE NEEDED

## 2023-11-13 ENCOUNTER — TRANSFERRED RECORDS (OUTPATIENT)
Dept: HEALTH INFORMATION MANAGEMENT | Facility: CLINIC | Age: 68
End: 2023-11-13
Payer: COMMERCIAL

## 2023-11-17 ENCOUNTER — OFFICE VISIT (OUTPATIENT)
Dept: INTERNAL MEDICINE | Facility: CLINIC | Age: 68
End: 2023-11-17
Payer: COMMERCIAL

## 2023-11-17 VITALS
RESPIRATION RATE: 20 BRPM | DIASTOLIC BLOOD PRESSURE: 87 MMHG | HEIGHT: 67 IN | SYSTOLIC BLOOD PRESSURE: 143 MMHG | WEIGHT: 176 LBS | BODY MASS INDEX: 27.62 KG/M2 | TEMPERATURE: 97.5 F | OXYGEN SATURATION: 99 % | HEART RATE: 77 BPM

## 2023-11-17 DIAGNOSIS — E78.5 HYPERLIPIDEMIA LDL GOAL <100: ICD-10-CM

## 2023-11-17 DIAGNOSIS — Z12.31 ENCOUNTER FOR SCREENING MAMMOGRAM FOR BREAST CANCER: ICD-10-CM

## 2023-11-17 DIAGNOSIS — E55.9 VITAMIN D DEFICIENCY: ICD-10-CM

## 2023-11-17 DIAGNOSIS — R03.0 ELEVATED BP WITHOUT DIAGNOSIS OF HYPERTENSION: ICD-10-CM

## 2023-11-17 DIAGNOSIS — Z00.00 ENCOUNTER FOR MEDICARE ANNUAL WELLNESS EXAM: Primary | ICD-10-CM

## 2023-11-17 DIAGNOSIS — E04.9 ENLARGED THYROID: ICD-10-CM

## 2023-11-17 LAB
ALBUMIN SERPL BCG-MCNC: 4.5 G/DL (ref 3.5–5.2)
ALP SERPL-CCNC: 67 U/L (ref 40–150)
ALT SERPL W P-5'-P-CCNC: 15 U/L (ref 0–50)
ANION GAP SERPL CALCULATED.3IONS-SCNC: 8 MMOL/L (ref 7–15)
AST SERPL W P-5'-P-CCNC: 19 U/L (ref 0–45)
BASOPHILS # BLD AUTO: 0 10E3/UL (ref 0–0.2)
BASOPHILS NFR BLD AUTO: 0 %
BILIRUB SERPL-MCNC: 0.5 MG/DL
BUN SERPL-MCNC: 13.1 MG/DL (ref 8–23)
CALCIUM SERPL-MCNC: 10 MG/DL (ref 8.8–10.2)
CHLORIDE SERPL-SCNC: 103 MMOL/L (ref 98–107)
CHOLEST SERPL-MCNC: 242 MG/DL
CREAT SERPL-MCNC: 0.81 MG/DL (ref 0.51–0.95)
DEPRECATED HCO3 PLAS-SCNC: 29 MMOL/L (ref 22–29)
EGFRCR SERPLBLD CKD-EPI 2021: 79 ML/MIN/1.73M2
EOSINOPHIL # BLD AUTO: 0.1 10E3/UL (ref 0–0.7)
EOSINOPHIL NFR BLD AUTO: 2 %
ERYTHROCYTE [DISTWIDTH] IN BLOOD BY AUTOMATED COUNT: 12 % (ref 10–15)
GLUCOSE SERPL-MCNC: 103 MG/DL (ref 70–99)
HCT VFR BLD AUTO: 41.9 % (ref 35–47)
HDLC SERPL-MCNC: 72 MG/DL
HGB BLD-MCNC: 14 G/DL (ref 11.7–15.7)
IMM GRANULOCYTES # BLD: 0 10E3/UL
IMM GRANULOCYTES NFR BLD: 0 %
LDLC SERPL CALC-MCNC: 149 MG/DL
LYMPHOCYTES # BLD AUTO: 1.6 10E3/UL (ref 0.8–5.3)
LYMPHOCYTES NFR BLD AUTO: 36 %
MCH RBC QN AUTO: 30.3 PG (ref 26.5–33)
MCHC RBC AUTO-ENTMCNC: 33.4 G/DL (ref 31.5–36.5)
MCV RBC AUTO: 91 FL (ref 78–100)
MONOCYTES # BLD AUTO: 0.4 10E3/UL (ref 0–1.3)
MONOCYTES NFR BLD AUTO: 9 %
NEUTROPHILS # BLD AUTO: 2.3 10E3/UL (ref 1.6–8.3)
NEUTROPHILS NFR BLD AUTO: 52 %
NONHDLC SERPL-MCNC: 170 MG/DL
PLATELET # BLD AUTO: 143 10E3/UL (ref 150–450)
POTASSIUM SERPL-SCNC: 4.6 MMOL/L (ref 3.4–5.3)
PROT SERPL-MCNC: 7.1 G/DL (ref 6.4–8.3)
RBC # BLD AUTO: 4.62 10E6/UL (ref 3.8–5.2)
SODIUM SERPL-SCNC: 140 MMOL/L (ref 135–145)
TRIGL SERPL-MCNC: 105 MG/DL
TSH SERPL DL<=0.005 MIU/L-ACNC: 0.82 UIU/ML (ref 0.3–4.2)
VIT D+METAB SERPL-MCNC: 43 NG/ML (ref 20–50)
WBC # BLD AUTO: 4.5 10E3/UL (ref 4–11)

## 2023-11-17 PROCEDURE — 99397 PER PM REEVAL EST PAT 65+ YR: CPT | Performed by: INTERNAL MEDICINE

## 2023-11-17 PROCEDURE — 82306 VITAMIN D 25 HYDROXY: CPT | Performed by: INTERNAL MEDICINE

## 2023-11-17 PROCEDURE — 36415 COLL VENOUS BLD VENIPUNCTURE: CPT | Performed by: INTERNAL MEDICINE

## 2023-11-17 PROCEDURE — 80061 LIPID PANEL: CPT | Performed by: INTERNAL MEDICINE

## 2023-11-17 PROCEDURE — 80050 GENERAL HEALTH PANEL: CPT | Performed by: INTERNAL MEDICINE

## 2023-11-17 RX ORDER — RESPIRATORY SYNCYTIAL VIRUS VACCINE 120MCG/0.5
0.5 KIT INTRAMUSCULAR ONCE
Qty: 1 EACH | Refills: 0 | Status: CANCELLED | OUTPATIENT
Start: 2023-11-17 | End: 2023-11-17

## 2023-11-17 ASSESSMENT — ENCOUNTER SYMPTOMS
HEMATOCHEZIA: 0
EYE PAIN: 0
FEVER: 0
MYALGIAS: 0
HEMATURIA: 0
ABDOMINAL PAIN: 0
WEAKNESS: 0
HEADACHES: 0
ARTHRALGIAS: 1
SHORTNESS OF BREATH: 0
DYSURIA: 0
CONSTIPATION: 0
COUGH: 0
FREQUENCY: 0
SORE THROAT: 0
JOINT SWELLING: 1
PARESTHESIAS: 0
PALPITATIONS: 0
DIZZINESS: 0
CHILLS: 0
NAUSEA: 0
BREAST MASS: 0
DIARRHEA: 0
NERVOUS/ANXIOUS: 0
HEARTBURN: 0

## 2023-11-17 ASSESSMENT — PAIN SCALES - GENERAL: PAINLEVEL: NO PAIN (0)

## 2023-11-17 ASSESSMENT — ACTIVITIES OF DAILY LIVING (ADL): CURRENT_FUNCTION: NO ASSISTANCE NEEDED

## 2023-11-17 NOTE — PATIENT INSTRUCTIONS
Monitor b/p at home for 2 weeks, then send results to  if we need b/p med.    2. Get RSV vaccine at the pharmacy    3. Thyroid US           Patient Education   Personalized Prevention Plan  You are due for the preventive services outlined below.  Your care team is available to assist you in scheduling these services.  If you have already completed any of these items, please share that information with your care team to update in your medical record.  Health Maintenance Due   Topic Date Due    RSV VACCINE (Pregnancy & 60+) (1 - 1-dose 60+ series) Never done    ANNUAL REVIEW OF HM ORDERS  11/12/2022

## 2023-11-17 NOTE — PROGRESS NOTES
"SUBJECTIVE:   Tosha is a 68 year old, presenting for the following:  Wellness Visit (General health check up. Would like to discuss underarm soreness.)        11/17/2023     9:47 AM   Additional Questions   Roomed by Lina MAYORGA       Are you in the first 12 months of your Medicare coverage?  No    Tosha is a 68 y.o. female who is an avid , with hyperlipidemia , atrophic vaginitis, colon polyps, chronic thrombocytopenia, facet arthropathy, number skin tags who is currently here for  a physical.     She sp[place] her right foot [place] the fair and has had persistent pain, she did see podiatrist and x-ray did not show any fracture.  Currently symptoms are gradually improving but has not gone away completely.    Tosha has noticed decrease of strength in her upper body and the starting to work with a .    She has had some fullness in her left underarm and possible lymphadenopathy on and off.  She feels that since she has been having COVID-vaccine into that shoulder that is when the problem started so this year she got her COVID-vaccine into her right shoulder.    Blood pressure at home has been in 180s but she does not check it frequently.  In the office today it is 143.    She sees [facility] for eye care and is up-to-date on her eye exam, she wears glasses, no recent vision changes.    No hearing problems or recent falls, she will be starting balance exercises as well.    Healthy Habits:     In general, how would you rate your overall health?  Good    Frequency of exercise:  2-3 days/week    Duration of exercise:  30-45 minutes    Do you usually eat at least 4 servings of fruit and vegetables a day, include whole grains    & fiber and avoid regularly eating high fat or \"junk\" foods?  Yes    Taking medications regularly:  Yes    Medication side effects:  Not applicable    Ability to successfully perform activities of daily living:  No assistance needed    Home Safety:  No safety concerns " identified    Hearing Impairment:  No hearing concerns    In the past 6 months, have you been bothered by leaking of urine?  No    In general, how would you rate your overall mental or emotional health?  Good    Additional concerns today:  Yes      Today's PHQ-2 Score:       11/17/2023     9:41 AM   PHQ-2 ( 1999 [employer])   Q1: Little interest or pleasure in doing things 0   Q2: Feeling down, depressed or hopeless 0   PHQ-2 Score 0   Q1: Little interest or pleasure in doing things Not at all   Q2: Feeling down, depressed or hopeless Not at all   PHQ-2 Score 0           Have you ever done Advance Care Planning? (For example, a Health Directive, POLST, or a discussion with a medical provider or your loved ones about your wishes): Yes, advance care planning is on file.    Cognitive Screening   1) Repeat 3 items (Leader, Season, Table)    2) Clock draw: NORMAL  3) 3 item recall: Recalls 3 objects  Results: 3 items recalled: COGNITIVE IMPAIRMENT LESS LIKELY    Mini-CogTM Copyright FRANKY Ruvalcaba. Licensed by the author for use in [facility]; reprinted with permission (hakan@[facility]). All rights reserved.      Do you have sleep apnea, excessive snoring or daytime drowsiness? : no    Reviewed and updated as needed this visit by clinical staff   Tobacco  Allergies  Meds              Reviewed and updated as needed this visit by Provider                 Social History     Tobacco Use     Smoking status: Never     Smokeless tobacco: Never   Substance Use Topics     Alcohol use: Yes     Alcohol/week: 6.0 standard drinks of alcohol     Comment: Alcoholic Drinks/day: moderately         11/10/2023    11:30 AM   Alcohol Use   Prescreen: >3 drinks/day or >7 drinks/week? No     Do you have a current opioid prescription? No  Do you use any other controlled substances or medications that are not prescribed by a provider? Alcohol      Current providers sharing in care for this patient include: Patient Care Team:  Naeem  MD Marya as PCP - General (Internal Medicine)  Marya Hartley MD as Assigned PCP  Marivel Palacios DPM, Podiatry/Foot and Ankle Surgery as Assigned Musculoskeletal Provider    The following health maintenance items are reviewed in Epic and correct as of today:  Health Maintenance   Topic Date Due     RSV VACCINE (Pregnancy & 60+) (1 - 1-dose 60+ series) Never done     ANNUAL REVIEW OF HM ORDERS  11/12/2022     MEDICARE ANNUAL WELLNESS VISIT  11/16/2023     FALL RISK ASSESSMENT  11/17/2024     MAMMO SCREENING  12/21/2024     ADVANCE CARE PLANNING  11/12/2026     LIPID  11/16/2027     COLORECTAL CANCER SCREENING  10/12/2032     DTAP/TDAP/TD IMMUNIZATION (3 - Td or Tdap) 04/20/2033     DEXA  11/29/2036     HEPATITIS C SCREENING  Completed     PHQ-2 (once per calendar year)  Completed     INFLUENZA VACCINE  Completed     Pneumococcal Vaccine: 65+ Years  Completed     ZOSTER IMMUNIZATION  Completed     COVID-19 Vaccine  Completed     IPV IMMUNIZATION  Aged Out     HPV IMMUNIZATION  Aged Out     MENINGITIS IMMUNIZATION  Aged Out     RSV MONOCLONAL ANTIBODY  Aged Out     PAP  Discontinued         11/9/2021     7:52 PM 11/29/2021     2:59 PM   Breast CA Risk Assessment (FHS-7)   Do you have a family history of breast, colon, or ovarian cancer? Yes No / Unknown   Pertinent mammograms are reviewed under the imaging tab.    Review of Systems   Constitutional:  Negative for chills and fever.   HENT:  Negative for congestion, ear pain, hearing loss and sore throat.    Eyes:  Negative for pain and visual disturbance.   Respiratory:  Negative for cough and shortness of breath.    Cardiovascular:  Negative for chest pain, palpitations and peripheral edema.   Gastrointestinal:  Negative for abdominal pain, constipation, diarrhea, heartburn, hematochezia and nausea.   Breasts:  Negative for tenderness, breast mass and discharge.   Genitourinary:  Negative for dysuria, frequency, genital sores, hematuria, pelvic pain, urgency,  "vaginal bleeding and vaginal discharge.   Musculoskeletal:  Positive for arthralgias and joint swelling. Negative for myalgias.   Skin:  Negative for rash.   Neurological:  Negative for dizziness, weakness, headaches and paresthesias.   Psychiatric/Behavioral:  Negative for mood changes. The patient is not nervous/anxious.    He sleeps well    OBJECTIVE:   BP (!) 143/87   Pulse 77   Temp 97.5  F (36.4  C) (Tympanic)   Resp 20   Ht 1.702 m (5' 7\")   Wt 79.8 kg (176 lb)   LMP  (LMP Unknown)   SpO2 99%   BMI 27.57 kg/m   Estimated body mass index is 27.57 kg/m  as calculated from the following:    Height as of this encounter: 1.702 m (5' 7\").    Weight as of this encounter: 79.8 kg (176 lb).  Physical Exam  General: well appearing female, alert and oriented x3  EYES: Eyelids, conjunctiva, and sclera were normal. Pupils were normal.   HEAD, EARS, NOSE, MOUTH, AND THROAT: no cervical LAD, thyroid is slightly enlarged on palpation without nodules appreciated o. TMs are visualized and normal, oropharynx is clear.  RESPIRATORY: respirations non labored, CTA bl, no wheezes, rales, no forced expiratory wheezing.  CARDIOVASCULAR: Heart rate and rhythm were normal. No murmurs, rubs,gallops. There was no peripheral edema. No carotid bruits.  GASTROINTESTINAL: Positive bowel sounds, abdomen is soft, non tender, non distended.     MUSCULOSKELETAL: Muscle mass was normal for age. No joint synovitis or deformity.  LYMPHATIC: There were no enlarged nodes palpable.  SKIN/HAIR/NAILS: Skin color was normal.  No rashes.  NEUROLOGIC: The patient was alert and oriented.  Speech was normal.  There is no facial asymmetry.   PSYCHIATRIC:  Mood and affect were normal.   Breast exam: No axillary lymphadenopathy, breast masses or skin changes appreciated      ASSESSMENT / PLAN:   Tosha was seen today for wellness visit.    Diagnoses and all orders for this visit:    Encounter for Medicare annual wellness exam  We will do fasting blood " "work today, mammogram ordered, she is up-to-date on colonoscopy, she is on a 5-year plan.  -     MA Screen Bilateral w/[place]ton; Future    Elevated BP without diagnosis of hypertension  As here to check her blood pressure daily for 2 weeks and send me records, she might be developing hypertension.  -     CBC with platelets and differential    Vitamin D deficiency  -     Vitamin D Deficiency    Hyperlipidemia LDL goal <100  -     Lipid panel reflex to direct LDL Fasting  -     Comprehensive metabolic panel  -     TSH with free T4 reflex    Enlarged thyroid  Of note sister has history of hyperparathyroidism, will check thyroid ultrasound and thyroid function  -     US Thyroid; Future    Other orders  -     PRIMARY CARE FOLLOW-UP SCHEDULING; Future        BMI:   Estimated body mass index is 27.57 kg/m  as calculated from the following:    Height as of this encounter: 1.702 m (5' 7\").    Weight as of this encounter: 79.8 kg (176 lb).         She reports that she has never smoked. She has never used smokeless tobacco.      Appropriate preventive services were discussed with this patient, including applicable screening as appropriate for fall prevention, nutrition, physical activity, Tobacco-use cessation, weight loss and cognition.  Checklist reviewing preventive services available has been given to the patient.    Reviewed patients plan of care and provided an AVS. The Basic Care Plan (routine screening as documented in Health Maintenance) for Tosha meets the Care Plan requirement. This Care Plan has been established and reviewed with the Patient.    Marya Hartley MD  [facility]    Identified Health Risks:    "

## 2023-12-04 ENCOUNTER — MYC MEDICAL ADVICE (OUTPATIENT)
Dept: INTERNAL MEDICINE | Facility: CLINIC | Age: 68
End: 2023-12-04
Payer: COMMERCIAL

## 2023-12-04 ENCOUNTER — HOSPITAL ENCOUNTER (OUTPATIENT)
Dept: ULTRASOUND IMAGING | Facility: HOSPITAL | Age: 68
Discharge: HOME | End: 2023-12-04
Attending: INTERNAL MEDICINE | Admitting: INTERNAL MEDICINE
Payer: COMMERCIAL

## 2023-12-04 DIAGNOSIS — E04.1 THYROID NODULE: Primary | ICD-10-CM

## 2023-12-04 DIAGNOSIS — E04.9 ENLARGED THYROID: ICD-10-CM

## 2023-12-04 PROCEDURE — 76536 US EXAM OF HEAD AND NECK: CPT

## 2023-12-08 ENCOUNTER — HOSPITAL ENCOUNTER (OUTPATIENT)
Dept: ULTRASOUND IMAGING | Facility: CLINIC | Age: 68
Discharge: HOME | End: 2023-12-08
Attending: INTERNAL MEDICINE | Admitting: INTERNAL MEDICINE
Payer: COMMERCIAL

## 2023-12-08 DIAGNOSIS — E04.1 THYROID NODULE: Primary | ICD-10-CM

## 2023-12-08 PROCEDURE — 88173 CYTOPATH EVAL FNA REPORT: CPT | Mod: TC | Performed by: INTERNAL MEDICINE

## 2023-12-08 PROCEDURE — 88172 CYTP DX EVAL FNA 1ST EA SITE: CPT | Mod: 26 | Performed by: PATHOLOGY

## 2023-12-08 PROCEDURE — 272N000431 US BIOPSY THYROID FINE NEEDLE ASPIRATION

## 2023-12-08 PROCEDURE — 88173 CYTOPATH EVAL FNA REPORT: CPT | Mod: 26 | Performed by: PATHOLOGY

## 2023-12-11 ENCOUNTER — MYC MEDICAL ADVICE (OUTPATIENT)
Dept: INTERNAL MEDICINE | Facility: CLINIC | Age: 68
End: 2023-12-11
Payer: COMMERCIAL

## 2023-12-12 LAB
PATH REPORT.COMMENTS IMP SPEC: NORMAL
PATH REPORT.FINAL DX SPEC: NORMAL
PATH REPORT.GROSS SPEC: NORMAL
PATH REPORT.MICROSCOPIC SPEC OTHER STN: NORMAL
PATH REPORT.RELEVANT HX SPEC: NORMAL

## 2023-12-22 ENCOUNTER — ANCILLARY PROCEDURE (OUTPATIENT)
Dept: MAMMOGRAPHY | Facility: HOSPITAL | Age: 68
End: 2023-12-22
Attending: INTERNAL MEDICINE
Payer: COMMERCIAL

## 2023-12-22 DIAGNOSIS — Z12.31 ENCOUNTER FOR SCREENING MAMMOGRAM FOR BREAST CANCER: ICD-10-CM

## 2023-12-22 PROCEDURE — 77067 SCR MAMMO BI INCL CAD: CPT

## 2024-04-16 ENCOUNTER — MYC MEDICAL ADVICE (OUTPATIENT)
Dept: INTERNAL MEDICINE | Facility: CLINIC | Age: 69
End: 2024-04-16
Payer: COMMERCIAL

## 2024-04-16 DIAGNOSIS — I10 PRIMARY HYPERTENSION: Primary | ICD-10-CM

## 2024-04-17 RX ORDER — LOSARTAN POTASSIUM 25 MG/1
25 TABLET ORAL DAILY
Qty: 30 TABLET | Refills: 2 | Status: SHIPPED | OUTPATIENT
Start: 2024-04-17 | End: 2024-06-11

## 2024-05-10 DIAGNOSIS — I10 PRIMARY HYPERTENSION: ICD-10-CM

## 2024-05-10 RX ORDER — LOSARTAN POTASSIUM 25 MG/1
25 TABLET ORAL DAILY
Status: CANCELLED | OUTPATIENT
Start: 2024-05-10

## 2024-05-13 NOTE — TELEPHONE ENCOUNTER
90 day supply refused until patient gives update on BP    Marya Hartley MD   to Tosha Roasles   [facility]      4/17/24  8:06 PM  Tosha  Lets try a small dose of medication: Losartan 25 mg a day. You can take it in am or pm. Let me know after 1 months how b/ps are . I sent Rx to your pharmacy. Goal b/p is consistently <130/80.     Dr PATEL    Last read by Tosha Rosales at  7:10 AM on 4/18/2024.

## 2024-06-11 ENCOUNTER — MYC MEDICAL ADVICE (OUTPATIENT)
Dept: INTERNAL MEDICINE | Facility: CLINIC | Age: 69
End: 2024-06-11
Payer: COMMERCIAL

## 2024-06-11 DIAGNOSIS — I10 PRIMARY HYPERTENSION: ICD-10-CM

## 2024-06-11 RX ORDER — LOSARTAN POTASSIUM 25 MG/1
25 TABLET ORAL DAILY
Qty: 90 TABLET | Refills: 3 | Status: SHIPPED | OUTPATIENT
Start: 2024-06-11

## 2024-06-11 NOTE — TELEPHONE ENCOUNTER
MD please review message and advise on mychart.     Rx pended for Losartan for 90 day supply if agreeable.

## 2024-07-05 ENCOUNTER — OFFICE VISIT (OUTPATIENT)
Dept: INTERNAL MEDICINE | Facility: CLINIC | Age: 69
End: 2024-07-05
Payer: COMMERCIAL

## 2024-07-05 VITALS
TEMPERATURE: 97 F | WEIGHT: 177 LBS | BODY MASS INDEX: 27.78 KG/M2 | SYSTOLIC BLOOD PRESSURE: 130 MMHG | HEART RATE: 73 BPM | DIASTOLIC BLOOD PRESSURE: 72 MMHG | HEIGHT: 67 IN | RESPIRATION RATE: 16 BRPM | OXYGEN SATURATION: 99 %

## 2024-07-05 DIAGNOSIS — M79.671 RIGHT FOOT PAIN: Primary | ICD-10-CM

## 2024-07-05 DIAGNOSIS — M79.674 PAIN OF TOE OF RIGHT FOOT: ICD-10-CM

## 2024-07-05 PROCEDURE — 99213 OFFICE O/P EST LOW 20 MIN: CPT | Performed by: INTERNAL MEDICINE

## 2024-07-05 RX ORDER — RESPIRATORY SYNCYTIAL VIRUS VACCINE 120MCG/0.5
0.5 KIT INTRAMUSCULAR ONCE
Qty: 1 EACH | Refills: 0 | Status: CANCELLED | OUTPATIENT
Start: 2024-07-05 | End: 2024-07-05

## 2024-07-05 ASSESSMENT — PAIN SCALES - GENERAL: PAINLEVEL: NO PAIN (0)

## 2024-07-05 NOTE — PATIENT INSTRUCTIONS
Possible toe arthritis causing pain. Voltaren gel 2ce a day, may need repeat XR, New inserts, weight loss.     2. Can do calcium cardiac score for cardiac risk stratification let me know.

## 2024-07-05 NOTE — PROGRESS NOTES
"  Assessment & Plan     Right big toe/foot pain  Symptoms on and off, she did have an x-ray in September which showed mild arthritis, she has not had any traumatic injury, differential diagnosis would be inflammation due to arthritis versus insufficiency fracture.  Discussed to use Voltaren gel twice a day on a more regular basis, okay to take oral NSAIDs as needed for more significant pain, I would recommend that she revisits her podiatry to have repeat x-ray done to see if anything has changed in 6 months and if symptoms persist, certainly MRI can assess for a more insidious stress fracture although her swelling and pain resolved completely if she is not overexerting herself.  Weight loss might also help as well as asking podiatry for specific inserts.  Currently no tenderness to palpation at the ball of her foot, cystic lesion/Hayden's neuroma is less likely.    Pain of toe of right foot  We do not have an x-ray tech in the clinic today, patient will come back for x-ray or have it done with his podiatry.  - XR Toe Right G/E 2 Views; Future    Moderate hyperlipidemia.  Discussed briefly given her age and hypertension she would be at a moderate risk going forward.  If she wanted to stratify risk further we can order cardiac calcium score testing, she will let me know if interested      BMI  Estimated body mass index is 27.72 kg/m  as calculated from the following:    Height as of this encounter: 1.702 m (5' 7\").    Weight as of this encounter: 80.3 kg (177 lb).       Subjective   Tosha is a 69 year old, presenting for the following health issues:  Musculoskeletal Problem (I have had ongoing issues with right foot/ankle pain and swelling. I have done the normal rest/ice etc things and it goes away for a bit but comes right back with any exertion. Would like a more complete diagnosis/care plan.)      7/5/2024    10:02 AM   Additional Questions   Roomed by Lina MAYORGA     Musculoskeletal Problem    History of Present " Illness       Reason for visit:  Foot insury/soreness  Symptom onset:  3-4 weeks ago  Symptoms include:  Pain in top right foot, near toes  Symptom intensity:  Moderate  Symptom progression:  Staying the same  Had these symptoms before:  Yes  Has tried/received treatment for these symptoms:  Yes  Previous treatment was successful:  No  What makes it worse:  This has come and gone a few times. Walking or crouching on toes makes it worse. Tight shoes (which I don't wear) can also make it worse.  What makes it better:  Elevating feet/ not exercising or walking at all.    She eats 4 or more servings of fruits and vegetables daily.She consumes 0 sweetened beverage(s) daily.She exercises with enough effort to increase her heart rate 20 to 29 minutes per day.  She exercises with enough effort to increase her heart rate 4 days per week.   She is taking medications regularly.     Tosha is a 69 y.o. female who is an avid , with hyperlipidemia , atrophic vaginitis, colon polyps, chronic thrombocytopenia, facet arthropathy, hyperlipidemia, number skin tags who is currently here for acute visit due to persistent pain in her right foot.    Going back, she does note that she had a sprain injury to her right ankle back in 2020, it has healed and she did complete physical therapy afterwards.  The pain in her foot started in September 2023 when she was working and [place] [employer] and was on her feet a lot, she does not remember any discrete injury but has been having pain around the base of her big toe radiating into the forefoot.  She has not had any ankle pain but always have mild swelling in the back around Achillis tendon visit pain.  She did see podiatry in September and rest Voltaren resolved pain however then she traveled to [place] and daily walks of 7000 steps of normal brought out the pain and can.  Currently symptoms are persistent and she gets pain if she walks for more than a mile or does more work in the  "garden.  She is not a runner.  If she is not putting weight on her toe she does not have any pain or swelling and no pain at nighttime.  She does get some swelling in her hands on and off if she uses them more often but denies the morning time stiffness for more than 10 minutes.  In the past when she had plantar fasciitis using inserts has helped to resolve her pain.    Review of systems: As above, no episodes of acute swelling redness or gout-like symptoms.      Objective    /72 (BP Location: Left arm, Patient Position: Sitting, Cuff Size: Adult Regular)   Pulse 73   Temp 97  F (36.1  C) (Tympanic)   Resp 16   Ht 1.702 m (5' 7\")   Wt 80.3 kg (177 lb)   LMP  (LMP Unknown)   SpO2 99%   BMI 27.72 kg/m    Body mass index is 27.72 kg/m .  Physical Exam   General: well appearing female, alert and oriented x3  EYES: Eyelids, conjunctiva, and sclera were normal. Pupils were normal.   RESPIRATORY: respirations non labored,   MUSCULOSKELETAL: Muscle mass was normal for age. No joint synovitis or deformity.  Feet appear symmetrically, no swelling or redness around her toe, no tenderness to deep palpation in the front ball of her right foot.  When she stands on her toe the base of her toe hurts.  LYMPHATIC: There were no enlarged nodes palpable.  SKIN/HAIR/NAILS: Skin color was normal.  No rashes.  NEUROLOGIC: The patient was alert and oriented.  Speech was normal.  There is no facial asymmetry.   PSYCHIATRIC:  Mood and affect were normal.          Signed Electronically by: Marya Hartley MD    "

## 2024-07-10 ENCOUNTER — ANCILLARY PROCEDURE (OUTPATIENT)
Dept: GENERAL RADIOLOGY | Facility: CLINIC | Age: 69
End: 2024-07-10
Attending: PODIATRIST
Payer: COMMERCIAL

## 2024-07-10 ENCOUNTER — OFFICE VISIT (OUTPATIENT)
Dept: PODIATRY | Facility: CLINIC | Age: 69
End: 2024-07-10
Payer: COMMERCIAL

## 2024-07-10 VITALS
BODY MASS INDEX: 27.78 KG/M2 | WEIGHT: 177 LBS | HEIGHT: 67 IN | DIASTOLIC BLOOD PRESSURE: 78 MMHG | SYSTOLIC BLOOD PRESSURE: 132 MMHG

## 2024-07-10 DIAGNOSIS — M19.071 ARTHRITIS OF RIGHT FOOT: ICD-10-CM

## 2024-07-10 DIAGNOSIS — M20.5X1 HALLUX LIMITUS, RIGHT: ICD-10-CM

## 2024-07-10 DIAGNOSIS — M79.674 PAIN OF RIGHT GREAT TOE: Primary | ICD-10-CM

## 2024-07-10 DIAGNOSIS — M79.674 PAIN OF RIGHT GREAT TOE: ICD-10-CM

## 2024-07-10 PROCEDURE — 73630 X-RAY EXAM OF FOOT: CPT | Mod: TC | Performed by: RADIOLOGY

## 2024-07-10 PROCEDURE — 99213 OFFICE O/P EST LOW 20 MIN: CPT | Performed by: PODIATRIST

## 2024-07-10 NOTE — PATIENT INSTRUCTIONS
"Thank you for choosing [facility] / Foot & Ankle Surgery!    DR [facility]AN[facility]S CLINIC:  [facility]   [id number]   [place] [address]   (Tues, Wed, Thurs AM, Fri PM)      TRIAGE LINE: [phone]  APPOINTMENTS: [phone]  RADIOLOGY: [phone]  SET UP SURGERY: [phone]  PHYSICAL THERAPY: [phone]   BILLING QUESTIONS: [phone]  FAX: [phone]       - Follow up: as needed    - Carbon fiber insert    DEGENERATIVE ARTHRITIS OF THE BIG TOE JOINT   (hallux limitus/hallux rigidus)   Arthritis of the joint at the base of the big toe (metatarsophalangeal joint) has several causes. Usually it results from repetitive trauma to the joint, secondary to abnormal foot mechanics. Often it is hereditary. However, a one-time traumatic event can lead to arthritis. The condition doesn't improve with time, and even with treatment, can worsen. The cartilage wears out, joint surfaces are no longer smooth, bone rubs on bone, inflammation occurs with pain, and eventually bone spurs and loose fragments might develop.   The joint is often painful with activity, worse with flimsy shoes or walking barefoot, and it slowly progresses over time. A person might notice the toe \"locking up\" with walking. There often is an obvious, and irritating, bony bump on top of the foot. Shoes might be uncomfortable. In some people the pain is so bothersome that recreational activities sometimes even normal daily activities are difficult to perform.   The pain from this arthritis is likely a combination of joint jamming, cartilage loss and inflammation, and irritation from shoes rubbing on the bump. Sometimes other parts of the foot, leg, or back hurt from altering one's walk to compensate for the painful joint.     Ways to help a person live with the discomfort include wearing a good, supportive shoe with a rigid, rocker-type bottom. An example is a hiking boot. A rigid sole minimizes bending of the " joint, and therefore, joint motion and pain. Shoes with a high toe box allow for less rubbing on the bump. Avoiding barefoot walking, sandals, flip-flops and slippers usually helps.   Sometimes an insert or orthotic provides symptom relief. This might make shoe fit more difficult. Pads over the bump and occasionally injections into the joint provide relief.   Surgery for this condition is aimed towards alleviating pain. It does not cure the arthritis nor does it guarantee better joint motion. Depending on the condition of the metatarsophalangeal joint, there are several surgiqal options:    1.  Cutting off the bony bump(s) and cleaning the joint    2.  Loosening the joint up by making cuts in the first metatarsal bone or the big toe bone and removing a small section of bone.    3.  Repositioning bone to minimize jamming of the joint.    4.  In severe cases, the joint is fused. By fusing the joint, it will never bend again. This resolves the pain, because it's the movement of a worn out joint that causes pain. Oftentimes the operation involves a combination of these procedures and. requires the use of screws, pins, and/or a small surgical plate.     Healing after surgery requires about six weeks of protection. This allows the bone to heal. Maximum recovery takes about one year. The scar tissue and joint structures require this amount of time to finish the healing process. Expect stiffness, swelling and numbness during that time frame.   Surgery for arthritis of the metatarsophalangeal joint does involve side effects. Some side effects are predictable and others are less common but do occur. A scar will be visible and could be irritated by shoes. The shoe may rub on the screw or internal pin requiring surgical removal of these fixation devices. The screw and pin would likely be left in place for a full year. The first toe may remain stiff after surgery. The amount of stiffness is variable. Most people never regain  normal motion of the first toe. This is due to scar tissue inherent to any surgery, in addition to the cumUlative effects of arthritis. Sometimes the big toe drifts to one side or the other. Joint fusion is one option to correct an unstable, drifting toe. This procedure straightens the toe, however, no motion remains.   All surgical procedures involve risk of infection, numbness, pain, delayed bone healing, osteotomy (bone cut) dislocation, blood clots, continued foot pain, etc. Arthritic joint surgery is quite complex and should not be taken lightly.    Any skin incision can lead to infection. Deep infection might involve the bone and thus repeat surgery and six weeks of IV antibiotics. Scar tissue can cause nerve pain or numbness. his is generally temporary but can be permanent. We do not have treatments that cure nerve problems. Second toe pain could be related to altered mechanics and pressure transferred to the second toe. Delayed bone healing would lengthen the healing time. Some bones simply do not heal. This requires repeat surgery, electronic bone stimulation and/or extended protection. Smokers have an approximate 20% chance of poor bone healing. This is double that of a non-smoker. The bone cut may displace. This may need to be repaired with a second operation. Displacement can cause joint malalignment. Immobility after surgery can cause a blood clot in the legs and lungs. This could result in death.   Foot pain is complex. Most feet hurt for more than one reason. Operating on the arthritic   big toe joint will not necessarily create a pain free foot. Appropriate shoes, healthy body weight, avoidance of bare foot walking and moderation of activity will always be   necessary to enjoy foot comfort. Arthritis is incurable even with surgery.     Surgery for this type of arthritis is nevertheless quite successful. Most surgical patients are pleased with their foot following surgery. Many of the issues described  above can be controlled by taking proper care of your foot during the healing process.   Cosmetic bump surgery is discouraged for the reasons listed above. A bump and joint that is comfortable when wearing appropriate shoes should simply be treated with appropriate shoes.   Your surgeon would be happy to fully describe any of the above issues. You should pursue a full understanding of the operation, recovery process and any potential problems that could develop.

## 2024-07-10 NOTE — LETTER
"7/10/2024      Tosha Rosales  [address] [facility] [address]      Dear Colleague,    Thank you for referring your patient, Tosha Rosales, to the [facility] POD[facility]. Please see a copy of my visit note below.    Podiatry / Foot and Ankle Surgery Progress Note    July 10, 2024    Subject: Patient was seen for pain to the right great toe.  Notes that started it started a few months ago when she started doing Pilates.  She stopped doing Pilates and it helped some but she still has pain by the end of the day.  Denies specific injury.  Pain can be 6 out of 10 at its worst.  Wondering what is causing the pain and what can be done for it.    Objective:  Vitals: /78   Ht 1.702 m (5' 7\")   Wt 80.3 kg (177 lb)   LMP  (LMP Unknown)   BMI 27.72 kg/m    BMI= Body mass index is 27.72 kg/m .    General:  Patient is alert and orientated.  NAD.    Vascular:  DP and PT pulses are palpable.  No edema or varicosities noted.  CFT's < 3secs.  Skin temp is normal.    Neuro:  Light and gross touch sensation intact to digits, dorsum, and plantar aspects of the feet.    Derm:  Skin is supple.  No rashes, lesions, or ulcerations noted.    Musculoskeletal: Pain with range of motion of the right first metatarsal phalangeal joint.    Imaging: Right foot x-rays -I personally reviewed the xrays.  Minimal degenerative changes noted to the first metatarsal phalangeal joint.  No fractures are noted.    Assessment:    Pain of right great toe  Hallux limitus, right  Arthritis of right foot    Medical Decision Making/Plan: Viewed and discussed x-rays. Reviewed and discussed causes of hallux limitus with patient.  Explained that it is a progressive arthritis meaning that over time, there is decrease in the joint space as well as bony spurring that occurs which leads to pain in the big toe especially with bending motions of the big toe joint.    Discussed treatment options with patient including rigid soled shoes " or orthotics that are stiff under the big toe that help to prevent motion at that joint which is leading to pain and inflammation.  We discussed that sometimes cortisone injections can help with the pain or physical therapy treatments such as ultrasound.  Discussed that this is normally a structural issue in the foot and if conservative therapy doesn't work, surgery is considered.    We discussed that depending on the quality of the cartilage and/or of the joint determines if patient will need a joint sparing or fusion procedure.  Discussed that this can usually not be determined by x-ray and is an intra- op position.  With joint sparing procedure, and implant is placed in the joint to try to help keep the range of motion at that the toe. Patient is normally minimally weight bearing in a cam boot for 3 weeks.  With fusion, patient is normally non weight bearing for 2 weeks, then minimal weight bearing for 4 weeks in boot.     At this time she would like to continue with topical pain cream try over-the-counter inserts.  If she would like custom inserts she will call.    If pain continues discussed possibly a cortisone injection.  She would like to hold off on that at this time.  All questions were answered to patient satisfaction and she will call further questions or concerns.      Patient Risk Factor:  Patient is a low risk factor for infection.     Marivel Palacios DPM, Podiatry/Foot and Ankle Surgery      Again, thank you for allowing me to participate in the care of your patient.        Sincerely,        Marivel Palacios DPM, Podiatry/Foot and Ankle Surgery

## 2024-07-10 NOTE — PROGRESS NOTES
"Podiatry / Foot and Ankle Surgery Progress Note    July 10, 2024    Subject: Patient was seen for pain to the right great toe.  Notes that started it started a few months ago when she started doing Pilates.  She stopped doing Pilates and it helped some but she still has pain by the end of the day.  Denies specific injury.  Pain can be 6 out of 10 at its worst.  Wondering what is causing the pain and what can be done for it.    Objective:  Vitals: /78   Ht 1.702 m (5' 7\")   Wt 80.3 kg (177 lb)   LMP  (LMP Unknown)   BMI 27.72 kg/m    BMI= Body mass index is 27.72 kg/m .    General:  Patient is alert and orientated.  NAD.    Vascular:  DP and PT pulses are palpable.  No edema or varicosities noted.  CFT's < 3secs.  Skin temp is normal.    Neuro:  Light and gross touch sensation intact to digits, dorsum, and plantar aspects of the feet.    Derm:  Skin is supple.  No rashes, lesions, or ulcerations noted.    Musculoskeletal: Pain with range of motion of the right first metatarsal phalangeal joint.    Imaging: Right foot x-rays -I personally reviewed the xrays.  Minimal degenerative changes noted to the first metatarsal phalangeal joint.  No fractures are noted.    Assessment:    Pain of right great toe  Hallux limitus, right  Arthritis of right foot    Medical Decision Making/Plan: Viewed and discussed x-rays. Reviewed and discussed causes of hallux limitus with patient.  Explained that it is a progressive arthritis meaning that over time, there is decrease in the joint space as well as bony spurring that occurs which leads to pain in the big toe especially with bending motions of the big toe joint.    Discussed treatment options with patient including rigid soled shoes or orthotics that are stiff under the big toe that help to prevent motion at that joint which is leading to pain and inflammation.  We discussed that sometimes cortisone injections can help with the pain or physical therapy treatments such as " ultrasound.  Discussed that this is normally a structural issue in the foot and if conservative therapy doesn't work, surgery is considered.    We discussed that depending on the quality of the cartilage and/or of the joint determines if patient will need a joint sparing or fusion procedure.  Discussed that this can usually not be determined by x-ray and is an intra- op position.  With joint sparing procedure, and implant is placed in the joint to try to help keep the range of motion at that the toe. Patient is normally minimally weight bearing in a cam boot for 3 weeks.  With fusion, patient is normally non weight bearing for 2 weeks, then minimal weight bearing for 4 weeks in boot.     At this time she would like to continue with topical pain cream try over-the-counter inserts.  If she would like custom inserts she will call.    If pain continues discussed possibly a cortisone injection.  She would like to hold off on that at this time.  All questions were answered to patient satisfaction and she will call further questions or concerns.      Patient Risk Factor:  Patient is a low risk factor for infection.     Marivel Palacios DPM, Podiatry/Foot and Ankle Surgery

## 2024-11-20 SDOH — HEALTH STABILITY: PHYSICAL HEALTH: ON AVERAGE, HOW MANY MINUTES DO YOU ENGAGE IN EXERCISE AT THIS LEVEL?: 40 MIN

## 2024-11-20 SDOH — HEALTH STABILITY: PHYSICAL HEALTH: ON AVERAGE, HOW MANY DAYS PER WEEK DO YOU ENGAGE IN MODERATE TO STRENUOUS EXERCISE (LIKE A BRISK WALK)?: 5 DAYS

## 2024-11-20 ASSESSMENT — SOCIAL DETERMINANTS OF HEALTH (SDOH): HOW OFTEN DO YOU GET TOGETHER WITH FRIENDS OR RELATIVES?: ONCE A WEEK

## 2024-11-25 ENCOUNTER — OFFICE VISIT (OUTPATIENT)
Dept: INTERNAL MEDICINE | Facility: CLINIC | Age: 69
End: 2024-11-25
Attending: INTERNAL MEDICINE
Payer: COMMERCIAL

## 2024-11-25 VITALS
HEIGHT: 67 IN | DIASTOLIC BLOOD PRESSURE: 76 MMHG | SYSTOLIC BLOOD PRESSURE: 130 MMHG | OXYGEN SATURATION: 99 % | WEIGHT: 182.8 LBS | RESPIRATION RATE: 10 BRPM | TEMPERATURE: 97.5 F | BODY MASS INDEX: 28.69 KG/M2 | HEART RATE: 83 BPM

## 2024-11-25 DIAGNOSIS — Z13.1 SCREENING FOR DIABETES MELLITUS: ICD-10-CM

## 2024-11-25 DIAGNOSIS — Z00.00 ENCOUNTER FOR ANNUAL WELLNESS EXAM IN MEDICARE PATIENT: Primary | ICD-10-CM

## 2024-11-25 DIAGNOSIS — I10 PRIMARY HYPERTENSION: ICD-10-CM

## 2024-11-25 DIAGNOSIS — Z78.0 POST-MENOPAUSAL: ICD-10-CM

## 2024-11-25 DIAGNOSIS — M79.671 RIGHT FOOT PAIN: ICD-10-CM

## 2024-11-25 DIAGNOSIS — E04.1 THYROID NODULE: ICD-10-CM

## 2024-11-25 DIAGNOSIS — E78.2 MIXED HYPERLIPIDEMIA: ICD-10-CM

## 2024-11-25 DIAGNOSIS — Z12.31 ENCOUNTER FOR SCREENING MAMMOGRAM FOR BREAST CANCER: ICD-10-CM

## 2024-11-25 DIAGNOSIS — D69.6 THROMBOCYTOPENIA (H): ICD-10-CM

## 2024-11-25 LAB
ALBUMIN SERPL BCG-MCNC: 4.3 G/DL (ref 3.5–5.2)
ALP SERPL-CCNC: 68 U/L (ref 40–150)
ALT SERPL W P-5'-P-CCNC: 17 U/L (ref 0–50)
ANION GAP SERPL CALCULATED.3IONS-SCNC: 8 MMOL/L (ref 7–15)
AST SERPL W P-5'-P-CCNC: 18 U/L (ref 0–45)
BASOPHILS # BLD AUTO: 0 10E3/UL (ref 0–0.2)
BASOPHILS NFR BLD AUTO: 0 %
BILIRUB SERPL-MCNC: 0.5 MG/DL
BUN SERPL-MCNC: 12.8 MG/DL (ref 8–23)
CALCIUM SERPL-MCNC: 10 MG/DL (ref 8.8–10.4)
CHLORIDE SERPL-SCNC: 101 MMOL/L (ref 98–107)
CHOLEST SERPL-MCNC: 268 MG/DL
CREAT SERPL-MCNC: 0.83 MG/DL (ref 0.51–0.95)
EGFRCR SERPLBLD CKD-EPI 2021: 76 ML/MIN/1.73M2
EOSINOPHIL # BLD AUTO: 0.1 10E3/UL (ref 0–0.7)
EOSINOPHIL NFR BLD AUTO: 2 %
ERYTHROCYTE [DISTWIDTH] IN BLOOD BY AUTOMATED COUNT: 11.8 % (ref 10–15)
EST. AVERAGE GLUCOSE BLD GHB EST-MCNC: 114 MG/DL
FASTING STATUS PATIENT QL REPORTED: ABNORMAL
FASTING STATUS PATIENT QL REPORTED: ABNORMAL
GLUCOSE SERPL-MCNC: 105 MG/DL (ref 70–99)
HBA1C MFR BLD: 5.6 % (ref 0–5.6)
HCO3 SERPL-SCNC: 29 MMOL/L (ref 22–29)
HCT VFR BLD AUTO: 41.3 % (ref 35–47)
HDLC SERPL-MCNC: 67 MG/DL
HGB BLD-MCNC: 13.9 G/DL (ref 11.7–15.7)
IMM GRANULOCYTES # BLD: 0 10E3/UL
IMM GRANULOCYTES NFR BLD: 0 %
LDLC SERPL CALC-MCNC: 169 MG/DL
LYMPHOCYTES # BLD AUTO: 1.8 10E3/UL (ref 0.8–5.3)
LYMPHOCYTES NFR BLD AUTO: 37 %
MCH RBC QN AUTO: 30.4 PG (ref 26.5–33)
MCHC RBC AUTO-ENTMCNC: 33.7 G/DL (ref 31.5–36.5)
MCV RBC AUTO: 90 FL (ref 78–100)
MONOCYTES # BLD AUTO: 0.4 10E3/UL (ref 0–1.3)
MONOCYTES NFR BLD AUTO: 8 %
NEUTROPHILS # BLD AUTO: 2.6 10E3/UL (ref 1.6–8.3)
NEUTROPHILS NFR BLD AUTO: 53 %
NONHDLC SERPL-MCNC: 201 MG/DL
PLATELET # BLD AUTO: 150 10E3/UL (ref 150–450)
POTASSIUM SERPL-SCNC: 4.6 MMOL/L (ref 3.4–5.3)
PROT SERPL-MCNC: 7.1 G/DL (ref 6.4–8.3)
RBC # BLD AUTO: 4.57 10E6/UL (ref 3.8–5.2)
SODIUM SERPL-SCNC: 138 MMOL/L (ref 135–145)
TRIGL SERPL-MCNC: 162 MG/DL
TSH SERPL DL<=0.005 MIU/L-ACNC: 0.92 UIU/ML (ref 0.3–4.2)
URATE SERPL-MCNC: 3.7 MG/DL (ref 2.4–5.7)
VIT B12 SERPL-MCNC: 652 PG/ML (ref 232–1245)
WBC # BLD AUTO: 4.9 10E3/UL (ref 4–11)

## 2024-11-25 PROCEDURE — 36415 COLL VENOUS BLD VENIPUNCTURE: CPT | Performed by: INTERNAL MEDICINE

## 2024-11-25 PROCEDURE — 99214 OFFICE O/P EST MOD 30 MIN: CPT | Mod: 25 | Performed by: INTERNAL MEDICINE

## 2024-11-25 PROCEDURE — 82607 VITAMIN B-12: CPT | Performed by: INTERNAL MEDICINE

## 2024-11-25 PROCEDURE — 84550 ASSAY OF BLOOD/URIC ACID: CPT | Performed by: INTERNAL MEDICINE

## 2024-11-25 PROCEDURE — 85025 COMPLETE CBC W/AUTO DIFF WBC: CPT | Performed by: INTERNAL MEDICINE

## 2024-11-25 PROCEDURE — 80053 COMPREHEN METABOLIC PANEL: CPT | Performed by: INTERNAL MEDICINE

## 2024-11-25 PROCEDURE — 84443 ASSAY THYROID STIM HORMONE: CPT | Performed by: INTERNAL MEDICINE

## 2024-11-25 PROCEDURE — 80061 LIPID PANEL: CPT | Performed by: INTERNAL MEDICINE

## 2024-11-25 PROCEDURE — G0439 PPPS, SUBSEQ VISIT: HCPCS | Performed by: INTERNAL MEDICINE

## 2024-11-25 PROCEDURE — 83036 HEMOGLOBIN GLYCOSYLATED A1C: CPT | Performed by: INTERNAL MEDICINE

## 2024-11-25 ASSESSMENT — PAIN SCALES - GENERAL: PAINLEVEL_OUTOF10: NO PAIN (0)

## 2024-11-25 NOTE — PATIENT INSTRUCTIONS
Check b/p daily for 2 weeks, and send me you numbers, gial b/p <130/80    2. If foot pain/ swelling  is getting worse we could consider MRI. Stretching foot on a rolling bottole in am.     3. Cardiac calcium score ordered.     4. Have thyroid US done

## 2024-11-25 NOTE — PROGRESS NOTES
Preventive Care Visit  [facility]  Marya Hartley MD, Internal Medicine  Nov 25, 2024      Assessment & Plan     Encounter for annual wellness exam in Medicare patient  Will do fasting labs today, she is due for bone density and mammogram.  She is up-to-date on vaccinations.  Colonoscopy will be due in 2027  - DX Bone Density; Future  - Hemoglobin A1c  - MA Screen Bilateral w/[place]; Future    Primary hypertension  Blood pressure slightly borderline, she is on a small dose of losartan, discussed to monitor it for 2 weeks at home and forward me numbers to see if we should increase her losartan  - CT Coronary Calcium Scan; Future  - Comprehensive metabolic panel  - TSH with free T4 reflex    Mixed hyperlipidemia  Moderate hyperlipidemia with moderate total cardiac risk, will repeat cholesterol levels today, she is interested in further stratify her risk by doing cardiac calcium score testing  - CT Coronary Calcium Scan; Future  - Lipid panel reflex to direct LDL Non-fasting  - Comprehensive metabolic panel  - TSH with free T4 reflex    Thrombocytopenia (H)  This is very mild, no significant bruising  - CBC with platelets and differential  - Vitamin B12    Right foot pain  Unclear why she has persistent pain, she rolled her ankle at the [employer] fair in 2023, since then she saw podiatry, recent x-rays showed mild arthritis, no fractures, no significant swelling on exam, pain is mild.  Discussed if pain becomes more persistent we could obtain MRI to rule out stress fracture or avascular necrosis.  Currently pain is not strong enough for her to consider doing it.  Will check uric acid just in case  - Uric acid    Thyroid nodule  Thyroid ultrasound last year showed multinodular thyroid gland, the largest nodule was biopsied and was benign, there was another nodule in the left lobe that needs to be monitored.  - US Thyroid; Future    BMI  Estimated body mass index is 28.62 kg/m  as calculated from the  "following:    Height as of this encounter: 1.702 m (5' 7.01\").    Weight as of this encounter: 82.9 kg (182 lb 12.8 oz).       Counseling  Appropriate preventive services were addressed with this patient via screening, questionnaire, or discussion as appropriate for fall prevention, nutrition, physical activity, Tobacco-use cessation, social engagement, weight loss and cognition.  Checklist reviewing preventive services available has been given to the patient.  Reviewed patient's diet, addressing concerns and/or questions.       Connor Givens is a 69 year old, presenting for the following:  Wellness Visit (  Pt would like to discuss: Thyroid testing, Calcium heart test, bloating, and right foot pain. )        11/25/2024    10:14 AM   Additional Questions   Roomed by Janneth YOU    Tosha is a 69 y.o. female who is an avid , with hyperlipidemia , atrophic vaginitis, colon polyps, chronic thrombocytopenia, facet arthropathy, hyperlipidemia, number skin tags who is currently here for wellness exam.    Overall she is doing well.  She does have moderate hyperlipidemia, in the past we discussed cardiac calcium score testing and she is interested in doing at this year.    She is physically active and denies any chest pains palpitations or dizziness with moderate walking, sometimes she might have slight shortness of breath when she is going on an incline but not severe.    Intermittently she would have abdominal bloating when eating hamburger the area of wine but it is not persistent and is diet dependent.  Bowels have been regular, she is next due for colonoscopy in 2027.    She continues to have pain on the dorsum of her right foot on and off, usually it is worse when she is standing for longer period of time, it started in 2023 after [employer] [place] possibly rolling her foot, she did see podiatry, x-ray did not show any significant arthritis or bony abnormality, currently symptoms are mild, discussed " we can do an MRI if there is a persistent.    She does wear glasses and has small cataracts and Fuchs changes but denies any significant changes in her vision, she sees ophthalmologist regularly.    No hearing problems or recent falls.    Health Care Directive  Patient does not have a Health Care Directive: Discussed advance care planning with patient; information given to patient to review.      11/20/2024   General Health   How would you rate your overall physical health? Good   Feel stress (tense, anxious, or unable to sleep) Only a little      (!) STRESS CONCERN      11/20/2024   Nutrition   Diet: Regular (no restrictions)            11/20/2024   Exercise   Days per week of moderate/strenous exercise 5 days   Average minutes spent exercising at this level 40 min            11/20/2024   Social Factors   Frequency of gathering with friends or relatives Once a week   Worry food won't last until get money to buy more No   Food not last or not have enough money for food? No   Do you have housing? (Housing is defined as stable permanent housing and does not include staying ouside in a car, in a tent, in an abandoned building, in an overnight shelter, or couch-surfing.) Yes   Are you worried about losing your housing? No   Lack of transportation? No   Unable to get utilities (heat,electricity)? No            11/20/2024   Fall Risk   Fallen 2 or more times in the past year? No    Trouble with walking or balance? No        Patient-reported          11/20/2024   Activities of Daily Living- Home Safety   Needs help with the following daily activites None of the above   Safety concerns in the home None of the above            11/20/2024   Dental   Dentist two times every year? Yes            11/20/2024   Hearing Screening   Hearing concerns? None of the above            11/20/2024   Driving Risk Screening   Patient/family members have concerns about driving No            11/20/2024   General Alertness/Fatigue Screening    Have you been more tired than usual lately? No            11/20/2024   Urinary Incontinence Screening   Bothered by leaking urine in past 6 months No            11/20/2024   TB Screening   Were you born outside of the [place]? No            Today's PHQ-2 Score:       11/25/2024    10:04 AM   PHQ-2 ( 1999 [employer])   Q1: Little interest or pleasure in doing things 0    Q2: Feeling down, depressed or hopeless 0    PHQ-2 Score 0    Q1: Little interest or pleasure in doing things Not at all   Q2: Feeling down, depressed or hopeless Not at all   PHQ-2 Score 0       Patient-reported           11/20/2024   Substance Use   Alcohol more than 3/day or more than 7/wk No   Do you have a current opioid prescription? No   How severe/bad is pain from 1 to 10? 1/10   Do you use any other substances recreationally? No        Social History     Tobacco Use    Smoking status: Never    Smokeless tobacco: Never   Vaping Use    Vaping status: Never Used   Substance Use Topics    Alcohol use: Yes     Alcohol/week: 6.0 standard drinks of alcohol     Comment: Alcoholic Drinks/day: moderately    Drug use: No     Comment: Drug use: rarely            12/22/2023   LAST FHS-7 RESULTS   1st degree relative breast or ovarian cancer No   Any relative bilateral breast cancer No   Any male have breast cancer No   Any ONE woman have BOTH breast AND ovarian cancer No   Any woman with breast cancer before 50yrs No   2 or more relatives with breast AND/OR ovarian cancer No   2 or more relatives with breast AND/OR bowel cancer No      History of abnormal Pap smear:         Latest Ref Rng & Units 6/7/2018    12:29 PM   PAP / HPV   PAP  Negative for squamous intraepithelial lesion or malignancy  Electronically signed by Mayte Goss CT (ASCP) on 6/19/2018 at  7:56 AM      HPV 16 DNA NEG Negative    HPV 18 DNA NEG Negative    Other HR HPV NEG Negative      ASCVD Risk   The 10-year ASCVD risk score (Marialuisa DK, et al., 2019) is: 10.5%    Values used  "to calculate the score:      Age: 69 years      Sex: Female      Is Non- : No      Diabetic: No      Tobacco smoker: No      Systolic Blood Pressure: 130 mmHg      Is BP treated: Yes      HDL Cholesterol: 72 mg/dL      Total Cholesterol: 242 mg/dL      Reviewed and updated as needed this visit by Provider                    Current providers sharing in care for this patient include:  Patient Care Team:  Marya Hartley MD as PCP - General (Internal Medicine)  Marya Hartley MD as Assigned PCP  Marivel Palacios DPM, Podiatry/Foot and Ankle Surgery as Assigned Musculoskeletal Provider    The following health maintenance items are reviewed in Epic and correct as of today:  Health Maintenance   Topic Date Due    ANNUAL REVIEW OF HM ORDERS  11/12/2022    BMP  11/17/2024    LIPID  11/17/2024    MEDICARE ANNUAL WELLNESS VISIT  11/17/2024    FALL RISK ASSESSMENT  11/25/2025    MAMMO SCREENING  12/22/2025    GLUCOSE  11/17/2026    ADVANCE CARE PLANNING  11/17/2028    RSV VACCINE (1 - 1-dose 75+ series) 04/30/2031    COLORECTAL CANCER SCREENING  10/12/2032    DTAP/TDAP/TD IMMUNIZATION (3 - Td or Tdap) 04/20/2033    DEXA  11/29/2036    HEPATITIS C SCREENING  Completed    PHQ-2 (once per calendar year)  Completed    INFLUENZA VACCINE  Completed    Pneumococcal Vaccine: 65+ Years  Completed    ZOSTER IMMUNIZATION  Completed    COVID-19 Vaccine  Completed    HPV IMMUNIZATION  Aged Out    MENINGITIS IMMUNIZATION  Aged Out    RSV MONOCLONAL ANTIBODY  Aged Out    PAP  Discontinued       Review of systems: As above, rest is negative, no blood in the stool no vaginal spotting or bleeding.     Objective    Exam  /76 (BP Location: Left arm, Patient Position: Sitting, Cuff Size: Adult Regular)   Pulse 83   Temp 97.5  F (36.4  C) (Tympanic)   Resp 10   Ht 1.702 m (5' 7.01\")   Wt 82.9 kg (182 lb 12.8 oz)   LMP  (LMP Unknown)   SpO2 99%   BMI 28.62 kg/m     Estimated body mass index is 28.62 " "kg/m  as calculated from the following:    Height as of this encounter: 1.702 m (5' 7.01\").    Weight as of this encounter: 82.9 kg (182 lb 12.8 oz).    Physical Exam  General: well appearing female, alert and oriented x3  EYES: Eyelids, conjunctiva, and sclera were normal. Pupils were normal.   HEAD, EARS, NOSE, MOUTH, AND THROAT: no cervical LAD, no thyromegaly or nodules appreciated. TMs are visualized and normal, oropharynx is clear.  RESPIRATORY: respirations non labored, CTA bl, no wheezes, rales, no forced expiratory wheezing.  CARDIOVASCULAR: Heart rate and rhythm were normal. No murmurs, rubs,gallops. There was no peripheral edema. No carotid bruits.  GASTROINTESTINAL: Positive bowel sounds, abdomen is soft, non tender, non distended.     MUSCULOSKELETAL: Muscle mass was normal for age. No joint synovitis or deformity.  No tenderness to palpation to the dorsum of her right foot, no significant swelling noted.  LYMPHATIC: There were no enlarged nodes palpable.  SKIN/HAIR/NAILS: Skin color was normal.  No rashes.  She does have multiple seborrheic keratoses on her back, she does see dermatologist regularly  NEUROLOGIC: The patient was alert and oriented.  Speech was normal.  There is no facial asymmetry.   PSYCHIATRIC:  Mood and affect were normal.   Breast exam: No axilla lymphadenopathy, breast masses or skin changes appreciated        11/25/2024   Mini Cog   Clock Draw Score 2 Normal   3 Item Recall 3 objects recalled   Mini Cog Total Score 5                 Signed Electronically by: Marya Hartley MD    "

## 2024-11-26 ENCOUNTER — PATIENT OUTREACH (OUTPATIENT)
Dept: CARE COORDINATION | Facility: CLINIC | Age: 69
End: 2024-11-26
Payer: COMMERCIAL

## 2024-12-02 ENCOUNTER — HOSPITAL ENCOUNTER (OUTPATIENT)
Dept: ULTRASOUND IMAGING | Facility: HOSPITAL | Age: 69
Discharge: HOME | End: 2024-12-02
Attending: INTERNAL MEDICINE | Admitting: INTERNAL MEDICINE
Payer: COMMERCIAL

## 2024-12-02 DIAGNOSIS — E04.1 THYROID NODULE: ICD-10-CM

## 2024-12-02 PROCEDURE — 76536 US EXAM OF HEAD AND NECK: CPT

## 2024-12-03 ENCOUNTER — ANCILLARY PROCEDURE (OUTPATIENT)
Dept: BONE DENSITY | Facility: CLINIC | Age: 69
End: 2024-12-03
Attending: INTERNAL MEDICINE
Payer: COMMERCIAL

## 2024-12-03 DIAGNOSIS — Z78.0 POST-MENOPAUSAL: ICD-10-CM

## 2024-12-03 PROCEDURE — 77080 DXA BONE DENSITY AXIAL: CPT | Mod: TC | Performed by: PHYSICIAN ASSISTANT

## 2024-12-03 PROCEDURE — 77091 TBS TECHL CALCULATION ONLY: CPT | Performed by: PHYSICIAN ASSISTANT

## 2024-12-11 ENCOUNTER — MYC MEDICAL ADVICE (OUTPATIENT)
Dept: INTERNAL MEDICINE | Facility: CLINIC | Age: 69
End: 2024-12-11

## 2024-12-11 ENCOUNTER — HOSPITAL ENCOUNTER (OUTPATIENT)
Dept: CT IMAGING | Facility: CLINIC | Age: 69
Discharge: HOME | End: 2024-12-11
Attending: INTERNAL MEDICINE
Payer: COMMERCIAL

## 2024-12-11 DIAGNOSIS — I10 PRIMARY HYPERTENSION: ICD-10-CM

## 2024-12-11 DIAGNOSIS — E78.2 MIXED HYPERLIPIDEMIA: ICD-10-CM

## 2024-12-11 LAB
CV CALCIUM SCORE AGATSTON LM: 0
CV CALCIUM SCORING AGATSON LAD: 0
CV CALCIUM SCORING AGATSTON CX: 0
CV CALCIUM SCORING AGATSTON RCA: 0
CV CALCIUM SCORING AGATSTON TOTAL: 0

## 2024-12-11 PROCEDURE — 75571 CT HRT W/O DYE W/CA TEST: CPT | Mod: 26 | Performed by: GENERAL ACUTE CARE HOSPITAL

## 2024-12-11 PROCEDURE — 75571 CT HRT W/O DYE W/CA TEST: CPT

## 2024-12-23 ENCOUNTER — ANCILLARY PROCEDURE (OUTPATIENT)
Dept: MAMMOGRAPHY | Facility: HOSPITAL | Age: 69
End: 2024-12-23
Attending: INTERNAL MEDICINE
Payer: COMMERCIAL

## 2024-12-23 DIAGNOSIS — Z12.31 ENCOUNTER FOR SCREENING MAMMOGRAM FOR BREAST CANCER: ICD-10-CM

## 2024-12-23 PROCEDURE — 77067 SCR MAMMO BI INCL CAD: CPT

## 2024-12-23 PROCEDURE — 77063 BREAST TOMOSYNTHESIS BI: CPT

## 2025-06-14 DIAGNOSIS — I10 PRIMARY HYPERTENSION: ICD-10-CM

## 2025-06-16 RX ORDER — LOSARTAN POTASSIUM 25 MG/1
25 TABLET ORAL DAILY
Qty: 90 TABLET | Refills: 0 | Status: SHIPPED | OUTPATIENT
Start: 2025-06-16

## 2025-07-09 ENCOUNTER — OFFICE VISIT (OUTPATIENT)
Dept: INTERNAL MEDICINE | Facility: CLINIC | Age: 70
End: 2025-07-09
Payer: COMMERCIAL

## 2025-07-09 VITALS
RESPIRATION RATE: 16 BRPM | DIASTOLIC BLOOD PRESSURE: 74 MMHG | OXYGEN SATURATION: 97 % | BODY MASS INDEX: 28.28 KG/M2 | WEIGHT: 180.2 LBS | HEART RATE: 90 BPM | SYSTOLIC BLOOD PRESSURE: 127 MMHG | HEIGHT: 67 IN | TEMPERATURE: 98.4 F

## 2025-07-09 DIAGNOSIS — R10.13 ABDOMINAL PAIN, EPIGASTRIC: Primary | ICD-10-CM

## 2025-07-09 DIAGNOSIS — R14.0 BLOATING: ICD-10-CM

## 2025-07-09 LAB
ALBUMIN SERPL BCG-MCNC: 4.1 G/DL (ref 3.5–5.2)
ALP SERPL-CCNC: 76 U/L (ref 40–150)
ALT SERPL W P-5'-P-CCNC: 13 U/L (ref 0–50)
ANION GAP SERPL CALCULATED.3IONS-SCNC: 9 MMOL/L (ref 7–15)
AST SERPL W P-5'-P-CCNC: 15 U/L (ref 0–45)
BASOPHILS # BLD AUTO: 0 10E3/UL (ref 0–0.2)
BASOPHILS NFR BLD AUTO: 0 %
BILIRUB SERPL-MCNC: 0.4 MG/DL
BUN SERPL-MCNC: 16.8 MG/DL (ref 8–23)
CALCIUM SERPL-MCNC: 10.1 MG/DL (ref 8.8–10.4)
CHLORIDE SERPL-SCNC: 102 MMOL/L (ref 98–107)
CREAT SERPL-MCNC: 0.85 MG/DL (ref 0.51–0.95)
EGFRCR SERPLBLD CKD-EPI 2021: 74 ML/MIN/1.73M2
EOSINOPHIL # BLD AUTO: 0.1 10E3/UL (ref 0–0.7)
EOSINOPHIL NFR BLD AUTO: 2 %
ERYTHROCYTE [DISTWIDTH] IN BLOOD BY AUTOMATED COUNT: 11.7 % (ref 10–15)
GLUCOSE SERPL-MCNC: 127 MG/DL (ref 70–99)
HCO3 SERPL-SCNC: 27 MMOL/L (ref 22–29)
HCT VFR BLD AUTO: 36.9 % (ref 35–47)
HGB BLD-MCNC: 12.7 G/DL (ref 11.7–15.7)
IMM GRANULOCYTES # BLD: 0 10E3/UL
IMM GRANULOCYTES NFR BLD: 0 %
LIPASE SERPL-CCNC: 32 U/L (ref 13–60)
LYMPHOCYTES # BLD AUTO: 1.9 10E3/UL (ref 0.8–5.3)
LYMPHOCYTES NFR BLD AUTO: 36 %
MCH RBC QN AUTO: 30.7 PG (ref 26.5–33)
MCHC RBC AUTO-ENTMCNC: 34.4 G/DL (ref 31.5–36.5)
MCV RBC AUTO: 89 FL (ref 78–100)
MONOCYTES # BLD AUTO: 0.4 10E3/UL (ref 0–1.3)
MONOCYTES NFR BLD AUTO: 7 %
NEUTROPHILS # BLD AUTO: 2.8 10E3/UL (ref 1.6–8.3)
NEUTROPHILS NFR BLD AUTO: 54 %
PLATELET # BLD AUTO: 172 10E3/UL (ref 150–450)
POTASSIUM SERPL-SCNC: 3.9 MMOL/L (ref 3.4–5.3)
PROT SERPL-MCNC: 6.7 G/DL (ref 6.4–8.3)
RBC # BLD AUTO: 4.14 10E6/UL (ref 3.8–5.2)
SODIUM SERPL-SCNC: 138 MMOL/L (ref 135–145)
WBC # BLD AUTO: 5.2 10E3/UL (ref 4–11)

## 2025-07-09 PROCEDURE — 99214 OFFICE O/P EST MOD 30 MIN: CPT | Performed by: INTERNAL MEDICINE

## 2025-07-09 PROCEDURE — 36415 COLL VENOUS BLD VENIPUNCTURE: CPT | Performed by: INTERNAL MEDICINE

## 2025-07-09 PROCEDURE — 80053 COMPREHEN METABOLIC PANEL: CPT | Performed by: INTERNAL MEDICINE

## 2025-07-09 PROCEDURE — 3074F SYST BP LT 130 MM HG: CPT | Performed by: INTERNAL MEDICINE

## 2025-07-09 PROCEDURE — 3078F DIAST BP <80 MM HG: CPT | Performed by: INTERNAL MEDICINE

## 2025-07-09 PROCEDURE — 85025 COMPLETE CBC W/AUTO DIFF WBC: CPT | Performed by: INTERNAL MEDICINE

## 2025-07-09 PROCEDURE — 83690 ASSAY OF LIPASE: CPT | Performed by: INTERNAL MEDICINE

## 2025-07-09 NOTE — PATIENT INSTRUCTIONS
Labs today and abdominal US.  If US is negative and pain continues, will get Ct scan, if still not diagnostic can do EGD.  Differential: gall stone, pancreatitis, diverticulitis.    2. Can try beano, digestive enzyme for bloating, probiotics. If having more acid reflux can take Prilosec before dinner.

## 2025-07-09 NOTE — PROGRESS NOTES
"  Assessment & Plan     Abdominal pain, epigastric there is radiation in the right and left upper quadrants, left more than right  High suspicion for gallstone colic, I will start with ultrasound and lab work, if nondiagnostic, will proceed with the CT scan next.  If that does not show anything and she still having symptoms we will do an endoscopy.  For now discussed to eat bland low-fat diet.  Based on exam diverticulitis is unlikely since I cannot elicit any pain.  - US Abdomen Limited; Future  - CBC with platelets and differential  - Comprehensive metabolic panel (BMP + Alb, Alk Phos, ALT, AST, Total. Bili, TP)  - Lipase    Bloating  History of bloating on and off chronically, it got worse recently after eating pecans, she does burp a lot and produces a lot of gas, she is familiar with FODMAP diet.  Currently discussed to try probiotics and digestive enzymes.  If bloating persists we can always have endoscopy done as well.        BMI  Estimated body mass index is 28.22 kg/m  as calculated from the following:    Height as of this encounter: 1.702 m (5' 7\").    Weight as of this encounter: 81.7 kg (180 lb 3.2 oz).       Subjective   Tosha is a 70 year old, presenting for the following health issues:  Bloated (fEELS BLOATED) and Abdominal Pain (Abdominal discomfort)      7/9/2025    12:05 PM   Additional Questions   Roomed by Lola   Accompanied by Alone     History of Present Illness       Reason for visit:  Stomach pain/bloating  Symptom onset:  1-2 weeks ago  Symptoms include:  Pain on side and center of abdomen, especially after eating; bloating  Symptom intensity:  Moderate  Symptom progression:  Improving  Had these symptoms before:  Yes  Has tried/received treatment for these symptoms:  No  What makes it worse:  Certain foods do exacerbate pain, such as coffee  What makes it better:  Not eating   She is taking medications regularly.      Tosha is a 70 y.o. female who is an avid , with hyperlipidemia , " "atrophic vaginitis, colon polyps, chronic thrombocytopenia, facet arthropathy, hyperlipidemia, number skin tags, who is currently here for acute visit due to upper abdominal pain.      Symptoms started 10 days ago, Tosha and her daughter were traveling to [place] for a baby shower.  She did bring some pecans that she bought at [employer] with her and has been snacking on it and using for breakfast.  That evening she developed crampy abdominal pain in the upper quadrants throughout in lower lower area, she had mild nausea but no vomiting and mild diarrhea.  She tried some Tums and the following day symptoms have improved enough where she was able to come to the baby shower.    Since then symptoms have been gradually improving and has been on and off, primarily in the upper abdomen, food intake specifically anything fatty or an hour or 2 after eating a fatty meal symptoms are worse.    No history of NSAIDs or aspirin use.  Bowels have been normal, nausea has resolved, she denies any fevers or chills.    Today pain is gone but she still has bloating, historically she has always had a sensitive stomach and avoids certain red meat and cruciferous vegetables which make her more bloated.    She did have colonoscopy in 2022 which showed several small polyps one of them was serrated adenoma, 5-year follow-up was recommended.    Review of systems as above      Objective    /74 (BP Location: Left arm, Patient Position: Sitting, Cuff Size: Adult Regular)   Pulse 90   Temp 98.4  F (36.9  C) (Oral)   Resp 16   Ht 1.702 m (5' 7\")   Wt 81.7 kg (180 lb 3.2 oz)   LMP  (LMP Unknown)   SpO2 97%   BMI 28.22 kg/m    Body mass index is 28.22 kg/m .  Physical Exam   General: well appearing female, alert and oriented x3  EYES: Eyelids, conjunctiva, and sclera were normal. Pupils were normal.   HEAD, EARS, NOSE, MOUTH, AND THROAT: no cervical LAD, no thyromegaly or nodules appreciated. TMs are visualized and normal, oropharynx is " clear.  RESPIRATORY: respirations non labored, CTA bl, no wheezes, rales, no forced expiratory wheezing.  CARDIOVASCULAR: Heart rate and rhythm were normal. No murmurs, rubs,gallops. There was no peripheral edema. No carotid bruits.  GASTROINTESTINAL: Positive bowel sounds, abdomen is soft, non tender, non distended.  Mild upper abdominal pressure but no pain, Weaver sign is negative.     MUSCULOSKELETAL: Muscle mass was normal for age. No joint synovitis or deformity.  LYMPHATIC: There were no enlarged nodes palpable.  SKIN/HAIR/NAILS: Skin color was normal.  No rashes.  NEUROLOGIC: The patient was alert and oriented.  Speech was normal.  There is no facial asymmetry.   PSYCHIATRIC:  Mood and affect were normal.            Signed Electronically by: Marya Hartley MD

## 2025-07-09 NOTE — H&P (VIEW-ONLY)
"  Assessment & Plan     Abdominal pain, epigastric there is radiation in the right and left upper quadrants, left more than right  High suspicion for gallstone colic, I will start with ultrasound and lab work, if nondiagnostic, will proceed with the CT scan next.  If that does not show anything and she still having symptoms we will do an endoscopy.  For now discussed to eat bland low-fat diet.  Based on exam diverticulitis is unlikely since I cannot elicit any pain.  - US Abdomen Limited; Future  - CBC with platelets and differential  - Comprehensive metabolic panel (BMP + Alb, Alk Phos, ALT, AST, Total. Bili, TP)  - Lipase    Bloating  History of bloating on and off chronically, it got worse recently after eating pecans, she does burp a lot and produces a lot of gas, she is familiar with FODMAP diet.  Currently discussed to try probiotics and digestive enzymes.  If bloating persists we can always have endoscopy done as well.      Addendum 07.11.25.  Ultrasound report showed gallbladder wall thickening, 2 cm stone impacted in the neck with mildly positive Weaver sign, concern for acute on chronic cholecystitis.  Blood work is encouraging, white cell count and red cell count low LFTs all normal, denies fevers or chills and pain overall has improved and is mild.  Urgent referral to general surgery was placed, she will need cholecystectomy done ASAP.  She is medically clear for that surgery: No history of anesthesia or sedation problems, no exertional chest pains, recent cardiac calcium CT or in December was 0.    BMI  Estimated body mass index is 28.22 kg/m  as calculated from the following:    Height as of this encounter: 1.702 m (5' 7\").    Weight as of this encounter: 81.7 kg (180 lb 3.2 oz).       Connor Givens is a 70 year old, presenting for the following health issues:  Bloated (fEELS BLOATED) and Abdominal Pain (Abdominal discomfort)      7/9/2025    12:05 PM   Additional Questions   Roomed by Lola "   Accompanied by Alone     History of Present Illness       Reason for visit:  Stomach pain/bloating  Symptom onset:  1-2 weeks ago  Symptoms include:  Pain on side and center of abdomen, especially after eating; bloating  Symptom intensity:  Moderate  Symptom progression:  Improving  Had these symptoms before:  Yes  Has tried/received treatment for these symptoms:  No  What makes it worse:  Certain foods do exacerbate pain, such as coffee  What makes it better:  Not eating   She is taking medications regularly.      Tosha is a 70 y.o. female who is an avid , with hyperlipidemia , atrophic vaginitis, colon polyps, chronic thrombocytopenia, facet arthropathy, hyperlipidemia, number skin tags, who is currently here for acute visit due to upper abdominal pain.      Symptoms started 10 days ago, Tosha and her daughter were traveling to [place] for a baby shower.  She did bring some pecans that she bought at [employer] with her and has been snacking on it and using for breakfast.  That evening she developed crampy abdominal pain in the upper quadrants throughout in lower lower area, she had mild nausea but no vomiting and mild diarrhea.  She tried some Tums and the following day symptoms have improved enough where she was able to come to the baby shower.    Since then symptoms have been gradually improving and has been on and off, primarily in the upper abdomen, food intake specifically anything fatty or an hour or 2 after eating a fatty meal symptoms are worse.    No history of NSAIDs or aspirin use.  Bowels have been normal, nausea has resolved, she denies any fevers or chills.    Today pain is gone but she still has bloating, historically she has always had a sensitive stomach and avoids certain red meat and cruciferous vegetables which make her more bloated.    She did have colonoscopy in 2022 which showed several small polyps one of them was serrated adenoma, 5-year follow-up was recommended.    Review of systems  "as above      Objective    /74 (BP Location: Left arm, Patient Position: Sitting, Cuff Size: Adult Regular)   Pulse 90   Temp 98.4  F (36.9  C) ([place])   Resp 16   Ht 1.702 m (5' 7\")   Wt 81.7 kg (180 lb 3.2 oz)   LMP  (LMP Unknown)   SpO2 97%   BMI 28.22 kg/m    Body mass index is 28.22 kg/m .  Physical Exam   General: well appearing female, alert and oriented x3  EYES: Eyelids, conjunctiva, and sclera were normal. Pupils were normal.   HEAD, EARS, NOSE, MOUTH, AND THROAT: no cervical LAD, no thyromegaly or nodules appreciated. TMs are visualized and normal, oropharynx is clear.  RESPIRATORY: respirations non labored, CTA bl, no wheezes, rales, no forced expiratory wheezing.  CARDIOVASCULAR: Heart rate and rhythm were normal. No murmurs, rubs,gallops. There was no peripheral edema. No carotid bruits.  GASTROINTESTINAL: Positive bowel sounds, abdomen is soft, non tender, non distended.  Mild upper abdominal pressure but no pain, Weaver sign is negative.     MUSCULOSKELETAL: Muscle mass was normal for age. No joint synovitis or deformity.  LYMPHATIC: There were no enlarged nodes palpable.  SKIN/HAIR/NAILS: Skin color was normal.  No rashes.  NEUROLOGIC: The patient was alert and oriented.  Speech was normal.  There is no facial asymmetry.   PSYCHIATRIC:  Mood and affect were normal.            Signed Electronically by: Marya Hartley MD    "

## 2025-07-11 ENCOUNTER — HOSPITAL ENCOUNTER (OUTPATIENT)
Dept: ULTRASOUND IMAGING | Facility: HOSPITAL | Age: 70
Discharge: HOME | End: 2025-07-11
Attending: INTERNAL MEDICINE | Admitting: INTERNAL MEDICINE
Payer: COMMERCIAL

## 2025-07-11 DIAGNOSIS — R10.13 ABDOMINAL PAIN, EPIGASTRIC: ICD-10-CM

## 2025-07-11 PROCEDURE — 76705 ECHO EXAM OF ABDOMEN: CPT

## 2025-07-11 NOTE — PROGRESS NOTES
"General Surgery Consult    HPI:    History of Present Illness    Tosha Rosales, 70-year-old female  - Bloating and abdominal discomfort began overnight prior to recent travel  - Took Tums the next morning with some improvement  - Persistent abdominal discomfort during travel, improved but not fully resolved  - Discomfort localized initially to abdomen, later more in the back  - Reports feeling better after avoiding fatty foods  - Unable to eat regularly due to symptoms  - History of migraines for 35 years  - No reported nausea, vomiting, fever, or chills       Allergies:Codeine    Past Medical History:   Diagnosis Date    Osteoarthritis     Osteoarthritis     Polyarthralgia     Polyarthralgia        Past Surgical History:   Procedure Laterality Date    BIOPSY BREAST Left 2004    DILATION AND CURETTAGE      LAPAROSCOPY DIAGNOSTIC (GENERAL)      infertility       CURRENT MEDS:  No current facility-administered medications for this visit.       Family History   Problem Relation Age of Onset    Dementia Mother     Depression Mother     Dementia Father     Cerebrovascular Disease Father     Brain Cancer Sister         astrocytoma    Cancer Sister         brain cancer: glioblastoma    No Known Problems Sister     No Known Problems Daughter     Breast Cancer Maternal Grandmother 85    No Known Problems Brother     No Known Problems Brother     No Known Problems Brother         reports that she has never smoked. She has never used smokeless tobacco. She reports current alcohol use of about 6.0 standard drinks of alcohol per week. She reports that she does not use drugs.    Review of Systems - Pertinent items are noted in HPI.    BP (!) 170/99   Pulse 82   Ht 1.702 m (5' 7\")   Wt 81.6 kg (180 lb)   LMP  (LMP Unknown)   BMI 28.19 kg/m      EXAM:  GENERAL: NAD  MOUTH: Mucus membranes moist  SKIN: Grossly intact  EYES: No icterus  CARDIAC: RRR   CHEST/LUNG: NLB  ABDOMEN: Soft, RUQ non-tender, Weaver's sign negative  NEURO: " Alert and oriented, nonfocal  PSYCHIATRIC: normal affect    LABS:    CMP/Lipase/CBC normal    IMAGES:     US 7/11/25:    GALLBLADDER: There is a large gallstone gallbladder neck that is nonmobile. Gallbladder wall is abnormal. It is diffusely thickened and lead multiple echogenic foci with ringdown artifact with areas of nodularity in the mid fundus consistent with   adenomyomatosis and likely cysts and polyps as well. There is a thickening of the wall measuring up to 9 with minimal pericholecystic fluid. Patient has a mildly positive sonographic Weaver's sign.     BILE DUCTS: No biliary dilatation. The common duct measures 5 mm.     LIVER: Increased echogenicity from diffuse fatty infiltration. No focal mass. The portal vein is patent with flow in the normal direction.     RIGHT KIDNEY: No hydronephrosis.     PANCREAS: The visualized portions are normal.     No ascites.                                                                      IMPRESSION:  1.  Gallbladder is abnormal. There is extensive adenomyomatosis with heterogeneous wall thickening, 2 cm stone impacted in the neck and a mildly positive sonographic Weaver's sign. Findings suggest an acute on chronic cholecystitis.  2.  Mild hepatic steatosis.    Assessment/Plan:     Pt with signs and symptoms consistent with biliary colic.     I have recommended a laparoscopic cholecystecomy.  Discussed risks/benefits/alternatives to removal of the gallbladder.    The risk discussion included, but was not limited to, death, myocardial infarction, pneumonia, urinary tract infection, deep venous thrombosis with or without pulmonary embolus, abdominal infection from bowel injury or abscess, bowel obstruction, wound infection, and bleeding.    Specific risks related to cholecystectomy include bile duct injury, bile leak, retained common bile duct stone, postcholecystectomy diarrhea and these complications may require additional treatment.    The potential that  gallbladder removal will NOT be of benefit was also reviewed.    Furthermore, alternative therapies and the option for no therapy were also reviewed.     Additionally, we did discuss the findings of a fair amount of thickening of the gallbladder wall.  I do not see any imaging findings to suggest invasion into the liver.  And her history is quite consistent with a benign biliary issue.  That said did discuss the possibility of unexpected pathology and potential need for further surgeries and/or therapies.  She expressed understanding.    Will get this scheduled.    Consent was obtained from the patient to use an AI documentation tool in the creation of this note.    Baltazar Mcguire MD

## 2025-07-14 ENCOUNTER — OFFICE VISIT (OUTPATIENT)
Dept: SURGERY | Facility: CLINIC | Age: 70
End: 2025-07-14
Attending: INTERNAL MEDICINE
Payer: COMMERCIAL

## 2025-07-14 VITALS
DIASTOLIC BLOOD PRESSURE: 99 MMHG | HEIGHT: 67 IN | BODY MASS INDEX: 28.25 KG/M2 | HEART RATE: 82 BPM | SYSTOLIC BLOOD PRESSURE: 170 MMHG | WEIGHT: 180 LBS

## 2025-07-14 DIAGNOSIS — K80.10 CALCULUS OF GALLBLADDER WITH CHRONIC CHOLECYSTITIS WITHOUT OBSTRUCTION: ICD-10-CM

## 2025-07-14 PROCEDURE — 99204 OFFICE O/P NEW MOD 45 MIN: CPT | Performed by: SURGERY

## 2025-07-14 PROCEDURE — 3080F DIAST BP >= 90 MM HG: CPT | Performed by: SURGERY

## 2025-07-14 PROCEDURE — 3077F SYST BP >= 140 MM HG: CPT | Performed by: SURGERY

## 2025-07-14 NOTE — LETTER
7/14/2025      Tosha Rosales  [address] [address]      Dear Colleague,    Thank you for referring your patient, Tosha Rosales, to the [facility] [place]. Please see a copy of my visit note below.    General Surgery Consult    HPI:    History of Present Illness    Tosha Rosales, 70-year-old female  - Bloating and abdominal discomfort began overnight prior to recent travel  - Took Tums the next morning with some improvement  - Persistent abdominal discomfort during travel, improved but not fully resolved  - Discomfort localized initially to abdomen, later more in the back  - Reports feeling better after avoiding fatty foods  - Unable to eat regularly due to symptoms  - History of migraines for 35 years  - No reported nausea, vomiting, fever, or chills       Allergies:Codeine    Past Medical History:   Diagnosis Date     Osteoarthritis      Osteoarthritis      Polyarthralgia      Polyarthralgia        Past Surgical History:   Procedure Laterality Date     BIOPSY BREAST Left 2004     DILATION AND CURETTAGE       LAPAROSCOPY DIAGNOSTIC (GENERAL)      infertility       CURRENT MEDS:  No current facility-administered medications for this visit.       Family History   Problem Relation Age of Onset     Dementia Mother      Depression Mother      Dementia Father      Cerebrovascular Disease Father      Brain Cancer Sister         astrocytoma     Cancer Sister         brain cancer: glioblastoma     No Known Problems Sister      No Known Problems Daughter      Breast Cancer Maternal Grandmother 85     No Known Problems Brother      No Known Problems Brother      No Known Problems Brother         reports that she has never smoked. She has never used smokeless tobacco. She reports current alcohol use of about 6.0 standard drinks of alcohol per week. She reports that she does not use drugs.    Review of Systems - Pertinent items are noted in HPI.    BP (!) 170/99    "Pulse 82   Ht 1.702 m (5' 7\")   Wt 81.6 kg (180 lb)   LMP  (LMP Unknown)   BMI 28.19 kg/m      EXAM:  GENERAL: NAD  MOUTH: Mucus membranes moist  SKIN: Grossly intact  EYES: No icterus  CARDIAC: RRR   CHEST/LUNG: NLB  ABDOMEN: Soft, RUQ non-tender, Weaver's sign negative  NEURO: Alert and oriented, nonfocal  PSYCHIATRIC: normal affect    LABS:    CMP/Lipase/CBC normal    IMAGES:     US 7/11/25:    GALLBLADDER: There is a large gallstone gallbladder neck that is nonmobile. Gallbladder wall is abnormal. It is diffusely thickened and lead multiple echogenic foci with ringdown artifact with areas of nodularity in the mid fundus consistent with   adenomyomatosis and likely cysts and polyps as well. There is a thickening of the wall measuring up to 9 with minimal pericholecystic fluid. Patient has a mildly positive sonographic Weaver's sign.     BILE DUCTS: No biliary dilatation. The common duct measures 5 mm.     LIVER: Increased echogenicity from diffuse fatty infiltration. No focal mass. The portal vein is patent with flow in the normal direction.     RIGHT KIDNEY: No hydronephrosis.     PANCREAS: The visualized portions are normal.     No ascites.                                                                      IMPRESSION:  1.  Gallbladder is abnormal. There is extensive adenomyomatosis with heterogeneous wall thickening, 2 cm stone impacted in the neck and a mildly positive sonographic Weaver's sign. Findings suggest an acute on chronic cholecystitis.  2.  Mild hepatic steatosis.    Assessment/Plan:     Pt with signs and symptoms consistent with biliary colic.     I have recommended a laparoscopic cholecystecomy.  Discussed risks/benefits/alternatives to removal of the gallbladder.    The risk discussion included, but was not limited to, death, myocardial infarction, pneumonia, urinary tract infection, deep venous thrombosis with or without pulmonary embolus, abdominal infection from bowel injury or abscess, " bowel obstruction, wound infection, and bleeding.    Specific risks related to cholecystectomy include bile duct injury, bile leak, retained common bile duct stone, postcholecystectomy diarrhea and these complications may require additional treatment.    The potential that gallbladder removal will NOT be of benefit was also reviewed.    Furthermore, alternative therapies and the option for no therapy were also reviewed.     Additionally, we did discuss the findings of a fair amount of thickening of the gallbladder wall.  I do not see any imaging findings to suggest invasion into the liver.  And her history is quite consistent with a benign biliary issue.  That said did discuss the possibility of unexpected pathology and potential need for further surgeries and/or therapies.  She expressed understanding.    Will get this scheduled.    Consent was obtained from the patient to use an AI documentation tool in the creation of this note.    Baltazar Mcguire MD       Again, thank you for allowing me to participate in the care of your patient.        Sincerely,        Baltazar Mcguire MD    Electronically signed

## 2025-07-16 ENCOUNTER — HOSPITAL ENCOUNTER (OUTPATIENT)
Facility: HOSPITAL | Age: 70
End: 2025-07-16
Attending: SURGERY | Admitting: SURGERY
Payer: COMMERCIAL

## 2025-07-16 ENCOUNTER — TELEPHONE (OUTPATIENT)
Dept: SURGERY | Facility: CLINIC | Age: 70
End: 2025-07-16
Payer: COMMERCIAL

## 2025-07-16 DIAGNOSIS — K81.1 CHRONIC CHOLECYSTITIS: Primary | ICD-10-CM

## 2025-07-16 NOTE — TELEPHONE ENCOUNTER
Spoke with patient today to schedule surgery as ordered by the provider.    WC/MVA Related?: No    Went over details/instructions as written on the letter.    Pre Op By: H&P by Primary MD  Post Op Scheduled : Not applicable    Medications:  Blood Thinners? No  Weight Loss Meds? No  Diabetes Meds? No    Surgery Letter sent via [employer]      (Please see LETTERS TAB in chart to retrieve a copy of this letter)

## 2025-07-16 NOTE — LETTER
Pre-op Physical: 7/9/2025 [facility]    Surgery Date: 7/24/2025     [place]: [place] [address]    Approximate Arrival Time: 1:00 pm  (Unless instructed differently by the pre-op call nurse)     Post op Appointment: RN will call 3-5 days post procedure to check in.    Pre-Surgical Tasks:     Review all medications with your primary care or prescribing physician; they will advise you which meds to stop and when, and when you can resume taking.  Certain medications like blood thinners and weight loss medications need to be stopped in advance of surgery to proceed safely.      Blood thinners including but not exclusive to drugs like Xarelto, Eliquis, Warfarin and Aspirin, should be stopped five days before surgery, if your prescribing provider agrees. Follow your provider's advice on stopping blood thinners because they know you best.  If you are unsure if your medication is a blood thinner, ask your prescribing provider.    Weight loss medications: There are multiple medications being used for weight management and diabetes today, and the list is growing.  Phentermine, Ozempic, Wegovy, Trulicity, and other similar medications need to be stopped one week before surgery to avoid being cancelled.  Victoza and Saxenda can be continued longer but must be stopped one full day before surgery.  Please ask your prescribing provider for advice.    Diabetic medications: in addition to the medications talked about above that are used for either weight loss or diabetes, some people are on insulin that may require adjustment.  Please discuss managing diabetic medications with your prescribing doctor as these medications may require modification prior to surgery.     Please shower the evening before and morning of surgery with Hibiclens soap.  Any [facility] can provide this to you at no cost, or it can be found at your local pharmacy.     Fasting  instructions will be provided by the pre-op nurse who will call you 1-3 days before surgery.  Typically, we advise normal food up to 8 hours before you arrive for surgery. Clear liquids only from then until 2 hours before you arrive surgery, then nothing at all by mouth.  The nurse will review your specific instructions with you at the call.      Smoking impacts your body's ability to heal properly so we advise patients to quit if possible before surgery.  Plastic Surgery patients are required to be nicotine free for at least 8 weeks before surgery.      You will need an adult to drive you home and stay with you 24 hours after surgery. Public transportation or Medical Van Services are not permitted.    Visitor restrictions are subject to change, please verify with the pre-op nurse when they call how many people are permitted to accompany you.    We always encourage you to notify your insurance any time you have medical tests or procedures scheduled including surgery. The number is usually right on the back of your insurance card. To obtain pricing for surgery, please call [facility] Cost of Care at [phone] or email [email or web address].        Call our office if you have any questions! Thank you!     Zafar Zarate  Complex   [facility]  [phone]    Electronically signed

## 2025-07-24 ENCOUNTER — ANESTHESIA (OUTPATIENT)
Dept: SURGERY | Facility: HOSPITAL | Age: 70
End: 2025-07-24
Payer: COMMERCIAL

## 2025-07-24 ENCOUNTER — ANESTHESIA EVENT (OUTPATIENT)
Dept: SURGERY | Facility: HOSPITAL | Age: 70
End: 2025-07-24
Payer: COMMERCIAL

## 2025-07-24 VITALS
DIASTOLIC BLOOD PRESSURE: 58 MMHG | HEART RATE: 99 BPM | BODY MASS INDEX: 27.36 KG/M2 | OXYGEN SATURATION: 99 % | WEIGHT: 174.7 LBS | RESPIRATION RATE: 16 BRPM | TEMPERATURE: 97.7 F | SYSTOLIC BLOOD PRESSURE: 122 MMHG

## 2025-07-24 PROCEDURE — 710N000009 HC RECOVERY PHASE 1, LEVEL 1, PER MIN: Performed by: SURGERY

## 2025-07-24 PROCEDURE — 250N000009 HC RX 250: Performed by: SURGERY

## 2025-07-24 PROCEDURE — 360N000076 HC SURGERY LEVEL 3, PER MIN: Performed by: SURGERY

## 2025-07-24 PROCEDURE — 250N000011 HC RX IP 250 OP 636: Performed by: NURSE ANESTHETIST, CERTIFIED REGISTERED

## 2025-07-24 PROCEDURE — 272N000001 HC OR GENERAL SUPPLY STERILE: Performed by: SURGERY

## 2025-07-24 PROCEDURE — 710N000012 HC RECOVERY PHASE 2, PER MINUTE: Performed by: SURGERY

## 2025-07-24 PROCEDURE — S2900 ROBOTIC SURGICAL SYSTEM: HCPCS | Performed by: SURGERY

## 2025-07-24 PROCEDURE — 258N000003 HC RX IP 258 OP 636: Performed by: NURSE ANESTHETIST, CERTIFIED REGISTERED

## 2025-07-24 PROCEDURE — 258N000003 HC RX IP 258 OP 636: Performed by: ANESTHESIOLOGY

## 2025-07-24 PROCEDURE — 47562 LAPAROSCOPIC CHOLECYSTECTOMY: CPT | Performed by: SURGERY

## 2025-07-24 PROCEDURE — 250N000011 HC RX IP 250 OP 636: Performed by: ANESTHESIOLOGY

## 2025-07-24 PROCEDURE — 250N000011 HC RX IP 250 OP 636: Performed by: SURGERY

## 2025-07-24 PROCEDURE — 88304 TISSUE EXAM BY PATHOLOGIST: CPT | Mod: TC | Performed by: SURGERY

## 2025-07-24 PROCEDURE — 250N000025 HC SEVOFLURANE, PER MIN: Performed by: SURGERY

## 2025-07-24 PROCEDURE — 370N000017 HC ANESTHESIA TECHNICAL FEE, PER MIN: Performed by: SURGERY

## 2025-07-24 PROCEDURE — 999N000141 HC STATISTIC PRE-PROCEDURE NURSING ASSESSMENT: Performed by: SURGERY

## 2025-07-24 PROCEDURE — 250N000009 HC RX 250: Performed by: NURSE ANESTHETIST, CERTIFIED REGISTERED

## 2025-07-24 RX ORDER — CEFAZOLIN SODIUM/WATER 2 G/20 ML
2 SYRINGE (ML) INTRAVENOUS SEE ADMIN INSTRUCTIONS
Status: DISCONTINUED | OUTPATIENT
Start: 2025-07-24 | End: 2025-07-24 | Stop reason: HOSPADM

## 2025-07-24 RX ORDER — SODIUM CHLORIDE, SODIUM LACTATE, POTASSIUM CHLORIDE, CALCIUM CHLORIDE 600; 310; 30; 20 MG/100ML; MG/100ML; MG/100ML; MG/100ML
INJECTION, SOLUTION INTRAVENOUS CONTINUOUS
Status: DISCONTINUED | OUTPATIENT
Start: 2025-07-24 | End: 2025-07-24 | Stop reason: HOSPADM

## 2025-07-24 RX ORDER — NALOXONE HYDROCHLORIDE 0.4 MG/ML
0.1 INJECTION, SOLUTION INTRAMUSCULAR; INTRAVENOUS; SUBCUTANEOUS
Status: CANCELLED | OUTPATIENT
Start: 2025-07-24

## 2025-07-24 RX ORDER — FENTANYL CITRATE 50 UG/ML
25 INJECTION, SOLUTION INTRAMUSCULAR; INTRAVENOUS EVERY 5 MIN PRN
Status: DISCONTINUED | OUTPATIENT
Start: 2025-07-24 | End: 2025-07-24 | Stop reason: HOSPADM

## 2025-07-24 RX ORDER — HYDROCODONE BITARTRATE AND ACETAMINOPHEN 5; 325 MG/1; MG/1
1 TABLET ORAL
Status: DISCONTINUED | OUTPATIENT
Start: 2025-07-24 | End: 2025-07-24 | Stop reason: HOSPADM

## 2025-07-24 RX ORDER — CEFAZOLIN SODIUM/WATER 2 G/20 ML
2 SYRINGE (ML) INTRAVENOUS
Status: COMPLETED | OUTPATIENT
Start: 2025-07-24 | End: 2025-07-24

## 2025-07-24 RX ORDER — DEXAMETHASONE SODIUM PHOSPHATE 10 MG/ML
INJECTION, SOLUTION INTRAMUSCULAR; INTRAVENOUS PRN
Status: DISCONTINUED | OUTPATIENT
Start: 2025-07-24 | End: 2025-07-24

## 2025-07-24 RX ORDER — OXYCODONE HYDROCHLORIDE 5 MG/1
5 TABLET ORAL
Refills: 0 | Status: CANCELLED | OUTPATIENT
Start: 2025-07-24

## 2025-07-24 RX ORDER — ONDANSETRON 2 MG/ML
4 INJECTION INTRAMUSCULAR; INTRAVENOUS EVERY 30 MIN PRN
Status: DISCONTINUED | OUTPATIENT
Start: 2025-07-24 | End: 2025-07-24 | Stop reason: HOSPADM

## 2025-07-24 RX ORDER — NALOXONE HYDROCHLORIDE 0.4 MG/ML
0.1 INJECTION, SOLUTION INTRAMUSCULAR; INTRAVENOUS; SUBCUTANEOUS
Status: DISCONTINUED | OUTPATIENT
Start: 2025-07-24 | End: 2025-07-24 | Stop reason: HOSPADM

## 2025-07-24 RX ORDER — PROPOFOL 10 MG/ML
INJECTION, EMULSION INTRAVENOUS PRN
Status: DISCONTINUED | OUTPATIENT
Start: 2025-07-24 | End: 2025-07-24

## 2025-07-24 RX ORDER — IBUPROFEN 200 MG
600 TABLET ORAL
Status: DISCONTINUED | OUTPATIENT
Start: 2025-07-24 | End: 2025-07-24 | Stop reason: HOSPADM

## 2025-07-24 RX ORDER — ONDANSETRON 2 MG/ML
INJECTION INTRAMUSCULAR; INTRAVENOUS PRN
Status: DISCONTINUED | OUTPATIENT
Start: 2025-07-24 | End: 2025-07-24

## 2025-07-24 RX ORDER — LIDOCAINE HYDROCHLORIDE 10 MG/ML
INJECTION, SOLUTION INFILTRATION; PERINEURAL PRN
Status: DISCONTINUED | OUTPATIENT
Start: 2025-07-24 | End: 2025-07-24

## 2025-07-24 RX ORDER — DEXAMETHASONE SODIUM PHOSPHATE 4 MG/ML
4 INJECTION, SOLUTION INTRA-ARTICULAR; INTRALESIONAL; INTRAMUSCULAR; INTRAVENOUS; SOFT TISSUE
Status: DISCONTINUED | OUTPATIENT
Start: 2025-07-24 | End: 2025-07-24 | Stop reason: HOSPADM

## 2025-07-24 RX ORDER — BUPIVACAINE HCL/EPINEPHRINE 0.25-.0005
VIAL (ML) INJECTION PRN
Status: DISCONTINUED | OUTPATIENT
Start: 2025-07-24 | End: 2025-07-24 | Stop reason: HOSPADM

## 2025-07-24 RX ORDER — HYDROMORPHONE HCL IN WATER/PF 6 MG/30 ML
0.4 PATIENT CONTROLLED ANALGESIA SYRINGE INTRAVENOUS EVERY 5 MIN PRN
Status: DISCONTINUED | OUTPATIENT
Start: 2025-07-24 | End: 2025-07-24 | Stop reason: HOSPADM

## 2025-07-24 RX ORDER — OXYCODONE HYDROCHLORIDE 5 MG/1
10 TABLET ORAL
Refills: 0 | Status: CANCELLED | OUTPATIENT
Start: 2025-07-24

## 2025-07-24 RX ORDER — ONDANSETRON 2 MG/ML
4 INJECTION INTRAMUSCULAR; INTRAVENOUS EVERY 30 MIN PRN
Status: CANCELLED | OUTPATIENT
Start: 2025-07-24

## 2025-07-24 RX ORDER — ONDANSETRON 4 MG/1
4 TABLET, ORALLY DISINTEGRATING ORAL EVERY 30 MIN PRN
Status: CANCELLED | OUTPATIENT
Start: 2025-07-24

## 2025-07-24 RX ORDER — HYDROCODONE BITARTRATE AND ACETAMINOPHEN 5; 325 MG/1; MG/1
1-2 TABLET ORAL EVERY 4 HOURS PRN
Qty: 12 TABLET | Refills: 0 | Status: SHIPPED | OUTPATIENT
Start: 2025-07-24

## 2025-07-24 RX ORDER — ONDANSETRON 4 MG/1
4 TABLET, ORALLY DISINTEGRATING ORAL EVERY 30 MIN PRN
Status: DISCONTINUED | OUTPATIENT
Start: 2025-07-24 | End: 2025-07-24 | Stop reason: HOSPADM

## 2025-07-24 RX ORDER — DEXAMETHASONE SODIUM PHOSPHATE 10 MG/ML
4 INJECTION, SOLUTION INTRAMUSCULAR; INTRAVENOUS
Status: CANCELLED | OUTPATIENT
Start: 2025-07-24

## 2025-07-24 RX ORDER — FENTANYL CITRATE 50 UG/ML
INJECTION, SOLUTION INTRAMUSCULAR; INTRAVENOUS PRN
Status: DISCONTINUED | OUTPATIENT
Start: 2025-07-24 | End: 2025-07-24

## 2025-07-24 RX ORDER — LIDOCAINE 40 MG/G
CREAM TOPICAL
Status: DISCONTINUED | OUTPATIENT
Start: 2025-07-24 | End: 2025-07-24 | Stop reason: HOSPADM

## 2025-07-24 RX ORDER — HYDROMORPHONE HCL IN WATER/PF 6 MG/30 ML
0.2 PATIENT CONTROLLED ANALGESIA SYRINGE INTRAVENOUS EVERY 5 MIN PRN
Status: DISCONTINUED | OUTPATIENT
Start: 2025-07-24 | End: 2025-07-24 | Stop reason: HOSPADM

## 2025-07-24 RX ORDER — KETOROLAC TROMETHAMINE 30 MG/ML
INJECTION, SOLUTION INTRAMUSCULAR; INTRAVENOUS PRN
Status: DISCONTINUED | OUTPATIENT
Start: 2025-07-24 | End: 2025-07-24

## 2025-07-24 RX ORDER — FENTANYL CITRATE 50 UG/ML
50 INJECTION, SOLUTION INTRAMUSCULAR; INTRAVENOUS EVERY 5 MIN PRN
Status: DISCONTINUED | OUTPATIENT
Start: 2025-07-24 | End: 2025-07-24 | Stop reason: HOSPADM

## 2025-07-24 RX ORDER — GLYCOPYRROLATE 0.2 MG/ML
INJECTION, SOLUTION INTRAMUSCULAR; INTRAVENOUS PRN
Status: DISCONTINUED | OUTPATIENT
Start: 2025-07-24 | End: 2025-07-24

## 2025-07-24 RX ADMIN — PROPOFOL 30 MG: 10 INJECTION, EMULSION INTRAVENOUS at 14:02

## 2025-07-24 RX ADMIN — LIDOCAINE HYDROCHLORIDE 2 ML: 10 INJECTION, SOLUTION INFILTRATION; PERINEURAL at 13:56

## 2025-07-24 RX ADMIN — FENTANYL CITRATE 50 MCG: 50 INJECTION, SOLUTION INTRAMUSCULAR; INTRAVENOUS at 13:56

## 2025-07-24 RX ADMIN — PHENYLEPHRINE HYDROCHLORIDE 100 MCG: 10 INJECTION INTRAVENOUS at 14:56

## 2025-07-24 RX ADMIN — Medication 2 G: at 13:52

## 2025-07-24 RX ADMIN — FENTANYL CITRATE 25 MCG: 50 INJECTION, SOLUTION INTRAMUSCULAR; INTRAVENOUS at 15:47

## 2025-07-24 RX ADMIN — KETOROLAC TROMETHAMINE 15 MG: 30 INJECTION, SOLUTION INTRAMUSCULAR at 15:22

## 2025-07-24 RX ADMIN — PHENYLEPHRINE HYDROCHLORIDE 50 MCG: 10 INJECTION INTRAVENOUS at 14:29

## 2025-07-24 RX ADMIN — PROPOFOL 170 MG: 10 INJECTION, EMULSION INTRAVENOUS at 13:56

## 2025-07-24 RX ADMIN — SODIUM CHLORIDE, SODIUM LACTATE, POTASSIUM CHLORIDE, AND CALCIUM CHLORIDE: .6; .31; .03; .02 INJECTION, SOLUTION INTRAVENOUS at 14:19

## 2025-07-24 RX ADMIN — ROCURONIUM 50 MG: 50 INJECTION, SOLUTION INTRAVENOUS at 13:58

## 2025-07-24 RX ADMIN — PHENYLEPHRINE HYDROCHLORIDE 100 MCG: 10 INJECTION INTRAVENOUS at 14:43

## 2025-07-24 RX ADMIN — PHENYLEPHRINE HYDROCHLORIDE 100 MCG: 10 INJECTION INTRAVENOUS at 14:40

## 2025-07-24 RX ADMIN — SUGAMMADEX 200 MG: 100 INJECTION, SOLUTION INTRAVENOUS at 15:22

## 2025-07-24 RX ADMIN — SODIUM CHLORIDE, SODIUM LACTATE, POTASSIUM CHLORIDE, AND CALCIUM CHLORIDE: .6; .31; .03; .02 INJECTION, SOLUTION INTRAVENOUS at 13:32

## 2025-07-24 RX ADMIN — FENTANYL CITRATE 50 MCG: 50 INJECTION, SOLUTION INTRAMUSCULAR; INTRAVENOUS at 14:06

## 2025-07-24 RX ADMIN — PHENYLEPHRINE HYDROCHLORIDE 100 MCG: 10 INJECTION INTRAVENOUS at 14:31

## 2025-07-24 RX ADMIN — ONDANSETRON 4 MG: 2 INJECTION INTRAMUSCULAR; INTRAVENOUS at 14:05

## 2025-07-24 RX ADMIN — GLYCOPYRROLATE 0.2 MG: 0.2 INJECTION, SOLUTION INTRAMUSCULAR; INTRAVENOUS at 14:56

## 2025-07-24 RX ADMIN — FENTANYL CITRATE 25 MCG: 50 INJECTION, SOLUTION INTRAMUSCULAR; INTRAVENOUS at 15:41

## 2025-07-24 RX ADMIN — PHENYLEPHRINE HYDROCHLORIDE 50 MCG: 10 INJECTION INTRAVENOUS at 14:28

## 2025-07-24 RX ADMIN — DEXAMETHASONE SODIUM PHOSPHATE 4 MG: 10 INJECTION, SOLUTION INTRAMUSCULAR; INTRAVENOUS at 14:05

## 2025-07-24 ASSESSMENT — ACTIVITIES OF DAILY LIVING (ADL)
ADLS_ACUITY_SCORE: 35

## 2025-07-24 NOTE — INTERVAL H&P NOTE
"I have reviewed the surgical (or preoperative) H&P that is linked to this encounter, and examined the patient. There are no significant changes    Patient feels quite bit better.  Has actually cut out fat in her diet has had no further symptoms.  Again discussed risk, benefits, alternatives including but not limited to bleeding, infection, common duct injury, bile leak, open procedure, pathology, among others.  She does have some gallbladder wall thickening, however, she has a 2 cm stone and chronic symptoms which I suspect is related to this.  She expressed understanding would like to proceed.    Clinical Conditions Present on Arrival:  Clinically Significant Risk Factors Present on Admission                       # Overweight: Estimated body mass index is 27.36 kg/m  as calculated from the following:    Height as of 7/14/25: 1.702 m (5' 7\").    Weight as of this encounter: 79.2 kg (174 lb 11.2 oz).       "

## 2025-07-24 NOTE — ANESTHESIA CARE TRANSFER NOTE
Patient: Tosha Rosales    Procedure: Procedure(s):  CHOLECYSTECTOMY, LAPAROSCOPIC       Diagnosis: Calculus of gallbladder with chronic cholecystitis without obstruction [K80.10]  Diagnosis Additional Information: No value filed.    Anesthesia Type:   General     Note:    Oropharynx: oropharynx clear of all foreign objects  Level of Consciousness: awake  Oxygen Supplementation: face mask  Level of Supplemental Oxygen (L/min / FiO2): 6  Independent Airway: airway patency satisfactory and stable  Dentition: dentition unchanged  Vital Signs Stable: post-procedure vital signs reviewed and stable  Report to RN Given: handoff report given  Patient transferred to: PACU    Handoff Report: Identifed the Patient, Identified the Reponsible Provider, Reviewed the pertinent medical history, Discussed the surgical course, Reviewed Intra-OP anesthesia mangement and issues during anesthesia, Set expectations for post-procedure period and Allowed opportunity for questions and acknowledgement of understanding      Vitals:  Vitals Value Taken Time   /67 07/24/25 15:31   Temp 97.8    Pulse 101 07/24/25 15:32   Resp 20 07/24/25 15:32   SpO2 100 % 07/24/25 15:32   Vitals shown include unfiled device data.    Electronically Signed By: MAXX Mir CRNA  July 24, 2025  3:33 PM

## 2025-07-24 NOTE — ANESTHESIA POSTPROCEDURE EVALUATION
Patient: Tosha Rosales    Procedure: Procedure(s):  CHOLECYSTECTOMY, LAPAROSCOPIC       Anesthesia Type:  General    Note:  Disposition: Outpatient   Postop Pain Control: Uneventful            Sign Out: Well controlled pain   PONV: No   Neuro/Psych: Uneventful            Sign Out: Acceptable/Baseline neuro status   Airway/Respiratory: Uneventful            Sign Out: Acceptable/Baseline resp. status   CV/Hemodynamics: Uneventful            Sign Out: Acceptable CV status; No obvious hypovolemia; No obvious fluid overload   Other NRE: NONE   DID A NON-ROUTINE EVENT OCCUR? No           Last vitals:  Vitals Value Taken Time   /64 07/24/25 16:00   Temp 36.7  C (98  F) 07/24/25 16:00   Pulse 100 07/24/25 16:09   Resp 14 07/24/25 16:09   SpO2 97 % 07/24/25 16:09   Vitals shown include unfiled device data.    Electronically Signed By: Albin Black MD  July 24, 2025  4:10 PM

## 2025-07-24 NOTE — ANESTHESIA PROCEDURE NOTES
Airway       Patient location during procedure: [place]       Procedure Start/Stop Times: 7/24/2025 2:04 PM and 7/24/2025 2:05 PM  Staff -        Anesthesiologist:  Albin Black MD       Other Anesthesia Staff: Ariana Giron       Performed By: XI  Consent for Airway        Urgency: elective  Indications and Patient Condition       Indications for airway management: melanie-procedural       Induction type:intravenous       Mask difficulty assessment: 2 - vent by mask + OA or adjuvant +/- NMBA    Final Airway Details       Final airway type: endotracheal airway       Successful airway: ETT - single  Endotracheal Airway Details        ETT size (mm): 7.0       Cuffed: yes       Successful intubation technique: video laryngoscopy       VL Blade Size: Glidescope 3 (x 1 attempt joshi 2 per srna without success)       Grade View of Cords: 1 (with glide.)       Adjucts: stylet       Position: Right       Measured from: lips       Secured at (cm): 21       Bite block used: None    Post intubation assessment        Placement verified by: capnometry, equal breath sounds and chest rise        Number of attempts at approach: 2       Number of other approaches attempted: 0       Secured with: tape       Ease of procedure: easy       Dentition: Intact and Unchanged       Dental guard used and removed. Dental Guard Type: Standard White.    Medication(s) Administered   Medication Administration Time: 7/24/2025 2:04 PM

## 2025-07-24 NOTE — OP NOTE
[facility]    Operative Note    Pre-operative diagnosis: Calculus of gallbladder with chronic cholecystitis without obstruction    Post-operative diagnosis Same   Procedure: Procedure(s):  CHOLECYSTECTOMY, LAPAROSCOPIC   Surgeon: Surgeons and Role:     * Baltazar Mcguire MD - Primary     * Sylwia Acosta PA-C - Assisting   Anesthesia: General    Estimated blood loss: 10cc   Drains: None   Specimens: ID Type Source Tests Collected by Time Destination   1 : GALLBLADDER Tissue Gallbladder SURGICAL PATHOLOGY EXAM Baltazar Mcguire MD 7/24/2025  3:05 PM       Findings: The gallbladder wall was thickened and edematous.  There were many adhesions to the gallbladder. There were gallstones.   Complications: None.   Implants: None.       OPERATIVE INDICATIONS:  Tosha Rosales is a 70 year old female with a history of RUQ abdominal pain associated with cholelithiasis on right upper quadrant ultrasound.      After understanding the risks and benefits of proceeding with surgery, the patient has an indication for laparoscopic cholecystectomy and consented to undergo surgery.      Prior to surgery, I reviewed the risks of gallbladder removal with this patient.    The risk discussion included, but was not limited to, death, myocardial infarction, pneumonia, urinary tract infection, deep venous thrombosis with or without pulmonary embolus, abdominal infection from bowel injury or abscess, bowel obstruction, wound infection, and bleeding.    Specific risks related to cholecystectomy include bile duct injury (3-4/1000), bile leak (10/1000), retained common bile duct stone (10/1000), postcholecystectomy diarrhea (1-2%) and these complications may require additional treatment.    The potential that gallbladder removal will NOT be of benefit was also reviewed.    Furthermore, alternative therapies and the option for no therapy were also reviewed.    Postoperative treatment and expected follow-up were  also reviewed.    Additional risk specifically related to this patient's preoperative condition were also emphasized due to gallbladder wall thickening.        OPERATIVE DETAILS:      The patient was brought to the operating room and prepared in a routine fashion.  A timeout was performed prior to surgery and documented by the nursing team.     Under the benefits of general anesthesia, an incision was made in the left upper quadrant Melara's point, Veress needle was inserted, and pneumoperitoneum was established using carbon dioxide gas to a maximum pressure of 15 mmHg.  A total of 4 ports were placed and the laparoscope was utilized from the umbilical port.     The patient was moved into a steep reverse Trendelenberg position.    Initial inspection of the abdomen there were a fair amount of adhesions to the gallbladder however there was no other concerning lesions.  There did not appear to be any invasion into the liver or other liver lesions.  Numerous adhesions to the gallbladder were taken down with a combination of blunt and sharp dissection.     The gallbladder was grasped at its fundus and retracted cephalad.  It was also grasped at its infundibulum and retracted laterally.       Findings related to the gallbladder are as noted above.     A complete dissection starting on the gallbladder, identifying the cystic artery on the surface of the gallbladder, was performed.  The cystic artery in this manner was  away from the gallbladder. The infundibulum of the gallbladder and the junction of gallbladder and cystic duct were clearly and completely identified with blunt dissection.  All of this dissection was performed on the gallbladder wall and clearly above the triangle of Calot.    The peritoneum on each side of gallbladder was divided at least one half of the way up towards the top of the gallbladder.     Next, a complete dissection of the upper aspect of the triangle of Calot was performed and the  critical view of safety was achieved.  The cystic artery and cystic duct were then the ONLY two structures traversing from gallbladder towards the lukas hepatis.  There was an additional branch of the cystic artery that I clipped quite high up on the gallbladder during dissection.  This was clearly going to the gallbladder, was an artery, and was clearly not entering the liver.     The cystic artery and cystic duct were clipped securely and divided between clips. The gallbladder was then removed out of its fossa using electrocautery.  It was placed into an Endocatch bag and removed from the abdomen.     Next, the gallbladder fossa was irrigated with a small aliquot of saline and the saline was aspirated.     Complete hemostasis was achieved.     The epigastric incision was closed using 0 Vicryl suture in figure of eight fashion.     The skin was closed using 4-0 monocryl suture and sterile dressing was applied.     All needle and sponge counts were correct x2 at the end of the procedure.    Dave PÉREZ was present during the case to assist with retraction and exposure.    Baltazar Mcguire MD

## 2025-07-24 NOTE — ANESTHESIA PREPROCEDURE EVALUATION
Anesthesia Pre-Procedure Evaluation    Patient: Tosha Rosales   MRN: [id number] : 1955          Procedure : Procedure(s):  CHOLECYSTECTOMY, LAPAROSCOPIC         Past Medical History:   Diagnosis Date    Atrophic vaginitis     Chronic ITP (idiopathic thrombocytopenia) (H)     Facet arthropathy     History of colonic polyps     HLD (hyperlipidemia)     Migraine     Osteoarthritis     Osteoarthritis     Polyarthralgia     Polyarthralgia       Past Surgical History:   Procedure Laterality Date    BIOPSY BREAST Left     DILATION AND CURETTAGE      LAPAROSCOPY DIAGNOSTIC (GENERAL)      infertility      Allergies   Allergen Reactions    Codeine Other (See Comments) and Unknown     [place] loopy      Social History     Tobacco Use    Smoking status: Never    Smokeless tobacco: Never   Substance Use Topics    Alcohol use: Yes     Alcohol/week: 6.0 standard drinks of alcohol     Comment: Alcoholic Drinks/day: moderately      Wt Readings from Last 1 Encounters:   25 79.2 kg (174 lb 11.2 oz)        Anesthesia Evaluation            ROS/MED HX  ENT/Pulmonary:  - neg pulmonary ROS     Neurologic:  - neg neurologic ROS     Cardiovascular:     (+)  hypertension- -   -  - -                                      METS/Exercise Tolerance:     Hematologic:       Musculoskeletal:       GI/Hepatic:       Renal/Genitourinary:       Endo:       Psychiatric/Substance Use:       Infectious Disease:       Malignancy:       Other:              Physical Exam  Airway  Mallampati: I  TM distance: >3 FB  Neck ROM: full  Mouth opening: >= 4 cm    Cardiovascular - normal exam   Dental     Pulmonary - normal exam      Neurological - normal exam  She appears awake, alert and oriented x3.    Other Findings       OUTSIDE LABS:  CBC:   Lab Results   Component Value Date    WBC 5.2 2025    WBC 4.9 2024    HGB 12.7 2025    HGB 13.9 2024    HCT 36.9 2025    HCT 41.3 2024     2025      "11/25/2024     BMP:   Lab Results   Component Value Date     07/09/2025     11/25/2024    POTASSIUM 3.9 07/09/2025    POTASSIUM 4.6 11/25/2024    CHLORIDE 102 07/09/2025    CHLORIDE 101 11/25/2024    CO2 27 07/09/2025    CO2 29 11/25/2024    BUN 16.8 07/09/2025    BUN 12.8 11/25/2024    CR 0.85 07/09/2025    CR 0.83 11/25/2024     (H) 07/09/2025     (H) 11/25/2024     COAGS: No results found for: \"PTT\", \"INR\", \"FIBR\"  POC: No results found for: \"BGM\", \"HCG\", \"HCGS\"  HEPATIC:   Lab Results   Component Value Date    ALBUMIN 4.1 07/09/2025    PROTTOTAL 6.7 07/09/2025    ALT 13 07/09/2025    AST 15 07/09/2025    ALKPHOS 76 07/09/2025    BILITOTAL 0.4 07/09/2025     OTHER:   Lab Results   Component Value Date    A1C 5.6 11/25/2024    DESTIN 10.1 07/09/2025    LIPASE 32 07/09/2025    TSH 0.92 11/25/2024    SED 10 12/03/2018       Anesthesia Plan    ASA Status:  2      NPO Status: NPO Appropriate   Anesthesia Type: General.  Airway: oral.  Induction: intravenous.  Maintenance: Inhalation.   Techniques and Equipment:       - Monitoring Plan: standard ASA monitoring     Consents    Anesthesia Plan(s) and associated risks, benefits, and realistic alternatives discussed. Questions answered and patient/representative(s) expressed understanding.     - Discussed: CRNA     - Discussed with:  Patient               Postoperative Care    Pain management: plan for postoperative opioid use.     Comments:                   Albin Black MD    I have reviewed the pertinent notes and labs in the chart from the past 30 days and (re)examined the patient.  Any updates or changes from those notes are reflected in this note.    Clinically Significant Risk Factors Present on Admission                   # Hypertension: Home medication list includes antihypertensive(s)           # Overweight: Estimated body mass index is 27.36 kg/m  as calculated from the following:    Height as of 7/14/25: 1.702 m (5' 7\").    Weight as of " this encounter: 79.2 kg (174 lb 11.2 oz).

## 2025-07-25 LAB
PATH REPORT.COMMENTS IMP SPEC: NORMAL
PATH REPORT.COMMENTS IMP SPEC: NORMAL
PATH REPORT.FINAL DX SPEC: NORMAL
PATH REPORT.GROSS SPEC: NORMAL
PATH REPORT.MICROSCOPIC SPEC OTHER STN: NORMAL
PATH REPORT.RELEVANT HX SPEC: NORMAL
PHOTO IMAGE: NORMAL

## 2025-07-25 PROCEDURE — 88304 TISSUE EXAM BY PATHOLOGIST: CPT | Mod: 26 | Performed by: PATHOLOGY

## 2025-07-28 ENCOUNTER — TELEPHONE (OUTPATIENT)
Dept: SURGERY | Facility: CLINIC | Age: 70
End: 2025-07-28

## 2025-07-28 NOTE — TELEPHONE ENCOUNTER
[facility] Post-Op Phone Call                     Surgeon: Baltazar Mcguire     Date of Surgery: 07/24/25  Surgery: Laparoscopic Cholecystectomy   Discharge Date: 07/24/25    Date/Time Called:   Date: 7/28/2025 Time: 2:08 PM   Attempt: First    Pain Control:  Intensity: Mild (1 - 3)  Duration/Location/Explain:   What makes it better/worse?     Medications:  Narcotic Use - No  Drug type: ibuprofen and tylenol   Frequency: as needed     Incisions:  Drainage? clean and dry  Any fever type symptoms? No  Comment:       GI:  Nausea? No  Vomiting? No  BM? Yes  Gas? Yes  Voiding Frequency? 4 or more/day   Appetite? Fair    Activity:  Walking activity? Yes  Frequency/Type: as tolerated   Restrictions: return to normal activity as tolerated     Return to Work Plans?  Expected date N/A   Do you need anything from us in this regard? No     Post-op appointment made? No        Thank you for your time. Please do not hesitate to call us with any questions or concerns.    Call completed by: Rachel Leo RN

## 2025-08-13 ENCOUNTER — OFFICE VISIT (OUTPATIENT)
Dept: INTERNAL MEDICINE | Facility: CLINIC | Age: 70
End: 2025-08-13
Payer: COMMERCIAL

## 2025-08-13 VITALS
TEMPERATURE: 97.3 F | WEIGHT: 175.2 LBS | HEART RATE: 72 BPM | RESPIRATION RATE: 17 BRPM | BODY MASS INDEX: 27.5 KG/M2 | SYSTOLIC BLOOD PRESSURE: 100 MMHG | OXYGEN SATURATION: 100 % | DIASTOLIC BLOOD PRESSURE: 78 MMHG | HEIGHT: 67 IN

## 2025-08-13 DIAGNOSIS — G89.29 CHRONIC PAIN OF RIGHT KNEE: ICD-10-CM

## 2025-08-13 DIAGNOSIS — M25.561 CHRONIC PAIN OF RIGHT KNEE: ICD-10-CM

## 2025-08-13 DIAGNOSIS — Z90.49 STATUS POST CHOLECYSTECTOMY: Primary | ICD-10-CM

## 2025-08-13 DIAGNOSIS — I10 PRIMARY HYPERTENSION: ICD-10-CM

## 2025-08-13 DIAGNOSIS — R39.89 URINE DISCOLORATION: ICD-10-CM

## 2025-08-13 LAB
ALBUMIN UR-MCNC: NEGATIVE MG/DL
APPEARANCE UR: CLEAR
BACTERIA #/AREA URNS HPF: ABNORMAL /HPF
BILIRUB UR QL STRIP: NEGATIVE
COLOR UR AUTO: YELLOW
GLUCOSE UR STRIP-MCNC: NEGATIVE MG/DL
HGB UR QL STRIP: NEGATIVE
HYALINE CASTS #/AREA URNS LPF: ABNORMAL /LPF
KETONES UR STRIP-MCNC: NEGATIVE MG/DL
LEUKOCYTE ESTERASE UR QL STRIP: NEGATIVE
NITRATE UR QL: NEGATIVE
PH UR STRIP: 6.5 [PH] (ref 5–8)
RBC #/AREA URNS AUTO: ABNORMAL /HPF
SP GR UR STRIP: 1.01 (ref 1–1.03)
SQUAMOUS #/AREA URNS AUTO: ABNORMAL /LPF
UROBILINOGEN UR STRIP-ACNC: 0.2 E.U./DL
WBC #/AREA URNS AUTO: ABNORMAL /HPF

## 2025-08-13 PROCEDURE — 1126F AMNT PAIN NOTED NONE PRSNT: CPT | Performed by: INTERNAL MEDICINE

## 2025-08-13 PROCEDURE — 3074F SYST BP LT 130 MM HG: CPT | Performed by: INTERNAL MEDICINE

## 2025-08-13 PROCEDURE — 99213 OFFICE O/P EST LOW 20 MIN: CPT | Performed by: INTERNAL MEDICINE

## 2025-08-13 PROCEDURE — 3078F DIAST BP <80 MM HG: CPT | Performed by: INTERNAL MEDICINE

## 2025-08-13 PROCEDURE — 81001 URINALYSIS AUTO W/SCOPE: CPT | Performed by: INTERNAL MEDICINE

## 2025-08-13 ASSESSMENT — PAIN SCALES - GENERAL: PAINLEVEL_OUTOF10: NO PAIN (0)

## 2025-08-28 ASSESSMENT — ACTIVITIES OF DAILY LIVING (ADL)
GO UP STAIRS: ACTIVITY IS NOT DIFFICULT
PAIN: I HAVE THE SYMPTOM BUT IT DOES NOT AFFECT MY ACTIVITY
KNEEL ON THE FRONT OF YOUR KNEE: ACTIVITY IS NOT DIFFICULT
HOW_WOULD_YOU_RATE_THE_CURRENT_FUNCTION_OF_YOUR_KNEE_DURING_YOUR_USUAL_DAILY_ACTIVITIES_ON_A_SCALE_FROM_0_TO_100_WITH_100_BEING_YOUR_LEVEL_OF_KNEE_FUNCTION_PRIOR_TO_YOUR_INJURY_AND_0_BEING_THE_INABILITY_TO_PERFORM_ANY_OF_YOUR_USUAL_DAILY_ACTIVITIES?: 90
GO DOWN STAIRS: ACTIVITY IS NOT DIFFICULT
GO DOWN STAIRS: ACTIVITY IS NOT DIFFICULT
STAND: ACTIVITY IS NOT DIFFICULT
AS_A_RESULT_OF_YOUR_KNEE_INJURY,_HOW_WOULD_YOU_RATE_YOUR_CURRENT_LEVEL_OF_DAILY_ACTIVITY?: NEARLY NORMAL
RISE FROM A CHAIR: ACTIVITY IS MINIMALLY DIFFICULT
STAND: ACTIVITY IS NOT DIFFICULT
WALK: ACTIVITY IS NOT DIFFICULT
SQUAT: ACTIVITY IS MINIMALLY DIFFICULT
STIFFNESS: THE SYMPTOM AFFECTS MY ACTIVITY SLIGHTLY
SWELLING: I HAVE THE SYMPTOM BUT IT DOES NOT AFFECT MY ACTIVITY
GIVING WAY, BUCKLING OR SHIFTING OF KNEE: I HAVE THE SYMPTOM BUT IT DOES NOT AFFECT MY ACTIVITY
AS_A_RESULT_OF_YOUR_KNEE_INJURY,_HOW_WOULD_YOU_RATE_YOUR_CURRENT_LEVEL_OF_DAILY_ACTIVITY?: NEARLY NORMAL
HOW_WOULD_YOU_RATE_THE_OVERALL_FUNCTION_OF_YOUR_KNEE_DURING_YOUR_USUAL_DAILY_ACTIVITIES?: NEARLY NORMAL
PLEASE_INDICATE_YOR_PRIMARY_REASON_FOR_REFERRAL_TO_THERAPY:: KNEE
WEAKNESS: I DO NOT HAVE THE SYMPTOM
SIT WITH YOUR KNEE BENT: ACTIVITY IS NOT DIFFICULT
HOW_WOULD_YOU_RATE_THE_CURRENT_FUNCTION_OF_YOUR_KNEE_DURING_YOUR_USUAL_DAILY_ACTIVITIES_ON_A_SCALE_FROM_0_TO_100_WITH_100_BEING_YOUR_LEVEL_OF_KNEE_FUNCTION_PRIOR_TO_YOUR_INJURY_AND_0_BEING_THE_INABILITY_TO_PERFORM_ANY_OF_YOUR_USUAL_DAILY_ACTIVITIES?: 90
KNEE_ACTIVITY_OF_DAILY_LIVING_SCORE: 90
GIVING WAY, BUCKLING OR SHIFTING OF KNEE: I HAVE THE SYMPTOM BUT IT DOES NOT AFFECT MY ACTIVITY
SWELLING: I HAVE THE SYMPTOM BUT IT DOES NOT AFFECT MY ACTIVITY
KNEE_ACTIVITY_OF_DAILY_LIVING_SUM: 63
KNEEL ON THE FRONT OF YOUR KNEE: ACTIVITY IS NOT DIFFICULT
RISE FROM A CHAIR: ACTIVITY IS MINIMALLY DIFFICULT
LIMPING: I DO NOT HAVE THE SYMPTOM
HOW_WOULD_YOU_RATE_THE_OVERALL_FUNCTION_OF_YOUR_KNEE_DURING_YOUR_USUAL_DAILY_ACTIVITIES?: NEARLY NORMAL
WALK: ACTIVITY IS NOT DIFFICULT
PAIN: I HAVE THE SYMPTOM BUT IT DOES NOT AFFECT MY ACTIVITY
SIT WITH YOUR KNEE BENT: ACTIVITY IS NOT DIFFICULT
STIFFNESS: THE SYMPTOM AFFECTS MY ACTIVITY SLIGHTLY
WEAKNESS: I DO NOT HAVE THE SYMPTOM
GO UP STAIRS: ACTIVITY IS NOT DIFFICULT
LIMPING: I DO NOT HAVE THE SYMPTOM
SQUAT: ACTIVITY IS MINIMALLY DIFFICULT
RAW_SCORE: 63

## 2025-09-02 ENCOUNTER — THERAPY VISIT (OUTPATIENT)
Dept: PHYSICAL THERAPY | Facility: CLINIC | Age: 70
End: 2025-09-02
Attending: INTERNAL MEDICINE
Payer: COMMERCIAL

## 2025-09-02 DIAGNOSIS — G89.29 CHRONIC PAIN OF RIGHT KNEE: ICD-10-CM

## 2025-09-02 DIAGNOSIS — M25.561 CHRONIC PAIN OF RIGHT KNEE: ICD-10-CM

## 2025-09-02 PROCEDURE — 97110 THERAPEUTIC EXERCISES: CPT | Mod: GP | Performed by: PHYSICAL THERAPIST

## 2025-09-02 PROCEDURE — 97161 PT EVAL LOW COMPLEX 20 MIN: CPT | Mod: GP | Performed by: PHYSICAL THERAPIST

## (undated) DEVICE — VIAL DECANTER STERILE WHITE DYNJDEC06

## (undated) DEVICE — ENDO TROCAR FIRST ENTRY KII FIOS Z-THRD 05X100MM CTF03

## (undated) DEVICE — ENDO TROCAR SLEEVE KII Z-THREADED 05X100MM CTS02

## (undated) DEVICE — CLIP APPLIER ENDO ROTATING 10MM MED/LG ER320

## (undated) DEVICE — CUSTOM PACK LAP CHOLE SBA5BLCHEA

## (undated) DEVICE — SOL RINGERS LACTATED 1000ML BAG 2B2324X

## (undated) DEVICE — SU VICRYL+ 0 27IN CT-1 UND VCP260H

## (undated) DEVICE — SUCTION MANIFOLD NEPTUNE 2 SYS 1 PORT 702-025-000

## (undated) DEVICE — TUBING SMOKE EVAC PNEUMOCLEAR HIGH FLOW 0620050250

## (undated) DEVICE — GLOVE UNDER INDICATOR PI SZ 7.0 LF 41670

## (undated) DEVICE — ENDO TROCAR FIRST ENTRY KII FIOS Z-THRD 11X100MM CTF33

## (undated) DEVICE — PREP CHLORAPREP 26ML TINTED HI-LITE ORANGE 930815

## (undated) DEVICE — GOWN LG DISP 9515

## (undated) DEVICE — NDL INSUFFLATION 13GA 120MM C2201

## (undated) DEVICE — Device

## (undated) DEVICE — SOLUTION WATER 1000ML BOTTLE R5000-01

## (undated) DEVICE — SU MONOCRYL+ 4-0 18IN PS2 UND MCP496G

## (undated) DEVICE — ESU GROUND PAD ADULT REM W/15' CORD E7507DB

## (undated) DEVICE — ENDO SHEARS RENEW LAP ENDOCUT SCISSOR TIP 16.5MM 3142

## (undated) DEVICE — GLOVE PI ULTRATCH M LF SZ 6.5 PF CUFF TEXT STRL LF 42665

## (undated) RX ORDER — PROPOFOL 10 MG/ML
INJECTION, EMULSION INTRAVENOUS
Status: DISPENSED
Start: 2025-07-24

## (undated) RX ORDER — BUPIVACAINE HCL/EPINEPHRINE 0.25-.0005
VIAL (ML) INJECTION
Status: DISPENSED
Start: 2025-07-24

## (undated) RX ORDER — LIDOCAINE HYDROCHLORIDE 10 MG/ML
INJECTION, SOLUTION EPIDURAL; INFILTRATION; INTRACAUDAL; PERINEURAL
Status: DISPENSED
Start: 2025-07-24

## (undated) RX ORDER — KETOROLAC TROMETHAMINE 30 MG/ML
INJECTION, SOLUTION INTRAMUSCULAR; INTRAVENOUS
Status: DISPENSED
Start: 2025-07-24

## (undated) RX ORDER — GLYCOPYRROLATE 0.2 MG/ML
INJECTION, SOLUTION INTRAMUSCULAR; INTRAVENOUS
Status: DISPENSED
Start: 2025-07-24

## (undated) RX ORDER — FENTANYL CITRATE 50 UG/ML
INJECTION, SOLUTION INTRAMUSCULAR; INTRAVENOUS
Status: DISPENSED
Start: 2025-07-24